# Patient Record
Sex: MALE | Race: WHITE | NOT HISPANIC OR LATINO | Employment: FULL TIME | URBAN - METROPOLITAN AREA
[De-identification: names, ages, dates, MRNs, and addresses within clinical notes are randomized per-mention and may not be internally consistent; named-entity substitution may affect disease eponyms.]

---

## 2017-03-18 ENCOUNTER — COMMUNICATION - HEALTHEAST (OUTPATIENT)
Dept: INTERNAL MEDICINE | Facility: CLINIC | Age: 57
End: 2017-03-18

## 2017-03-22 ENCOUNTER — OFFICE VISIT - HEALTHEAST (OUTPATIENT)
Dept: INTERNAL MEDICINE | Facility: CLINIC | Age: 57
End: 2017-03-22

## 2017-03-22 DIAGNOSIS — J02.9 SORE THROAT: ICD-10-CM

## 2017-03-22 DIAGNOSIS — R79.81 BORDERLINE LOW O2 SATURATION: ICD-10-CM

## 2017-03-22 DIAGNOSIS — E66.01 MORBID OBESITY DUE TO EXCESS CALORIES (H): ICD-10-CM

## 2017-03-22 DIAGNOSIS — R40.0 DAYTIME SOMNOLENCE: ICD-10-CM

## 2017-03-23 ENCOUNTER — COMMUNICATION - HEALTHEAST (OUTPATIENT)
Dept: INTERNAL MEDICINE | Facility: CLINIC | Age: 57
End: 2017-03-23

## 2017-03-27 ENCOUNTER — AMBULATORY - HEALTHEAST (OUTPATIENT)
Dept: INTERNAL MEDICINE | Facility: CLINIC | Age: 57
End: 2017-03-27

## 2017-03-27 ENCOUNTER — COMMUNICATION - HEALTHEAST (OUTPATIENT)
Dept: INTERNAL MEDICINE | Facility: CLINIC | Age: 57
End: 2017-03-27

## 2017-06-06 ENCOUNTER — COMMUNICATION - HEALTHEAST (OUTPATIENT)
Dept: INTERNAL MEDICINE | Facility: CLINIC | Age: 57
End: 2017-06-06

## 2017-07-28 ENCOUNTER — COMMUNICATION - HEALTHEAST (OUTPATIENT)
Dept: INTERNAL MEDICINE | Facility: CLINIC | Age: 57
End: 2017-07-28

## 2018-05-14 ENCOUNTER — COMMUNICATION - HEALTHEAST (OUTPATIENT)
Dept: SCHEDULING | Facility: CLINIC | Age: 58
End: 2018-05-14

## 2018-05-25 ENCOUNTER — COMMUNICATION - HEALTHEAST (OUTPATIENT)
Dept: INTERNAL MEDICINE | Facility: CLINIC | Age: 58
End: 2018-05-25

## 2018-06-12 ENCOUNTER — COMMUNICATION - HEALTHEAST (OUTPATIENT)
Dept: INTERNAL MEDICINE | Facility: CLINIC | Age: 58
End: 2018-06-12

## 2018-06-16 ENCOUNTER — COMMUNICATION - HEALTHEAST (OUTPATIENT)
Dept: INTERNAL MEDICINE | Facility: CLINIC | Age: 58
End: 2018-06-16

## 2018-06-18 ENCOUNTER — COMMUNICATION - HEALTHEAST (OUTPATIENT)
Dept: SCHEDULING | Facility: CLINIC | Age: 58
End: 2018-06-18

## 2018-06-18 ENCOUNTER — AMBULATORY - HEALTHEAST (OUTPATIENT)
Dept: INTERNAL MEDICINE | Facility: CLINIC | Age: 58
End: 2018-06-18

## 2018-06-18 DIAGNOSIS — I10 ESSENTIAL HYPERTENSION: ICD-10-CM

## 2018-06-18 DIAGNOSIS — E78.5 HYPERLIPIDEMIA: ICD-10-CM

## 2018-06-18 DIAGNOSIS — R79.89 ABNORMAL LFTS (LIVER FUNCTION TESTS): ICD-10-CM

## 2018-06-18 DIAGNOSIS — Z12.5 SCREENING FOR PROSTATE CANCER: ICD-10-CM

## 2018-06-20 ENCOUNTER — AMBULATORY - HEALTHEAST (OUTPATIENT)
Dept: LAB | Facility: CLINIC | Age: 58
End: 2018-06-20

## 2018-06-20 DIAGNOSIS — Z12.5 SCREENING FOR PROSTATE CANCER: ICD-10-CM

## 2018-06-20 DIAGNOSIS — I10 ESSENTIAL HYPERTENSION: ICD-10-CM

## 2018-06-20 DIAGNOSIS — E78.5 HYPERLIPIDEMIA: ICD-10-CM

## 2018-06-20 LAB
ALBUMIN SERPL-MCNC: 3.9 G/DL (ref 3.5–5)
ALBUMIN UR-MCNC: ABNORMAL MG/DL
ALP SERPL-CCNC: 47 U/L (ref 45–120)
ALT SERPL W P-5'-P-CCNC: 71 U/L (ref 0–45)
ANION GAP SERPL CALCULATED.3IONS-SCNC: 10 MMOL/L (ref 5–18)
APPEARANCE UR: CLEAR
AST SERPL W P-5'-P-CCNC: 44 U/L (ref 0–40)
BACTERIA #/AREA URNS HPF: ABNORMAL HPF
BILIRUB SERPL-MCNC: 0.6 MG/DL (ref 0–1)
BILIRUB UR QL STRIP: NEGATIVE
BUN SERPL-MCNC: 13 MG/DL (ref 8–22)
CALCIUM SERPL-MCNC: 9.7 MG/DL (ref 8.5–10.5)
CHLORIDE BLD-SCNC: 109 MMOL/L (ref 98–107)
CHOLEST SERPL-MCNC: 255 MG/DL
CO2 SERPL-SCNC: 26 MMOL/L (ref 22–31)
COLOR UR AUTO: YELLOW
CREAT SERPL-MCNC: 1.08 MG/DL (ref 0.7–1.3)
FASTING STATUS PATIENT QL REPORTED: YES
GFR SERPL CREATININE-BSD FRML MDRD: >60 ML/MIN/1.73M2
GLUCOSE BLD-MCNC: 97 MG/DL (ref 70–125)
GLUCOSE UR STRIP-MCNC: NEGATIVE MG/DL
HDLC SERPL-MCNC: 38 MG/DL
HGB BLD-MCNC: 15.5 G/DL (ref 14–18)
HGB UR QL STRIP: ABNORMAL
KETONES UR STRIP-MCNC: NEGATIVE MG/DL
LDLC SERPL CALC-MCNC: 166 MG/DL
LEUKOCYTE ESTERASE UR QL STRIP: ABNORMAL
MUCOUS THREADS #/AREA URNS LPF: ABNORMAL LPF
NITRATE UR QL: NEGATIVE
PH UR STRIP: 5.5 [PH] (ref 5–8)
POTASSIUM BLD-SCNC: 4.3 MMOL/L (ref 3.5–5)
PROT SERPL-MCNC: 7.1 G/DL (ref 6–8)
PSA SERPL-MCNC: 0.4 NG/ML (ref 0–3.5)
RBC #/AREA URNS AUTO: ABNORMAL HPF
SODIUM SERPL-SCNC: 145 MMOL/L (ref 136–145)
SP GR UR STRIP: 1.02 (ref 1–1.03)
SQUAMOUS #/AREA URNS AUTO: ABNORMAL LPF
TRIGL SERPL-MCNC: 256 MG/DL
UROBILINOGEN UR STRIP-ACNC: ABNORMAL
WBC #/AREA URNS AUTO: ABNORMAL HPF

## 2018-06-26 ENCOUNTER — OFFICE VISIT - HEALTHEAST (OUTPATIENT)
Dept: INTERNAL MEDICINE | Facility: CLINIC | Age: 58
End: 2018-06-26

## 2018-06-26 ENCOUNTER — COMMUNICATION - HEALTHEAST (OUTPATIENT)
Dept: INTERNAL MEDICINE | Facility: CLINIC | Age: 58
End: 2018-06-26

## 2018-06-26 ENCOUNTER — RECORDS - HEALTHEAST (OUTPATIENT)
Dept: GENERAL RADIOLOGY | Facility: CLINIC | Age: 58
End: 2018-06-26

## 2018-06-26 DIAGNOSIS — S89.90XA LEG INJURY: ICD-10-CM

## 2018-06-26 DIAGNOSIS — S89.90XA UNSPECIFIED INJURY OF UNSPECIFIED LOWER LEG, INITIAL ENCOUNTER: ICD-10-CM

## 2018-06-26 DIAGNOSIS — Z12.11 SCREEN FOR COLON CANCER: ICD-10-CM

## 2018-06-26 ASSESSMENT — MIFFLIN-ST. JEOR: SCORE: 2309.05

## 2018-07-20 ENCOUNTER — COMMUNICATION - HEALTHEAST (OUTPATIENT)
Dept: INTERNAL MEDICINE | Facility: CLINIC | Age: 58
End: 2018-07-20

## 2018-07-23 ENCOUNTER — OFFICE VISIT - HEALTHEAST (OUTPATIENT)
Dept: INTERNAL MEDICINE | Facility: CLINIC | Age: 58
End: 2018-07-23

## 2018-07-23 DIAGNOSIS — F41.8 DEPRESSION WITH ANXIETY: ICD-10-CM

## 2018-07-23 DIAGNOSIS — E66.01 MORBID OBESITY (H): ICD-10-CM

## 2018-07-23 DIAGNOSIS — R31.29 MICROSCOPIC HEMATURIA: ICD-10-CM

## 2018-07-23 DIAGNOSIS — Z00.00 ROUTINE GENERAL MEDICAL EXAMINATION AT A HEALTH CARE FACILITY: ICD-10-CM

## 2018-07-23 DIAGNOSIS — I10 ESSENTIAL HYPERTENSION: ICD-10-CM

## 2018-07-23 DIAGNOSIS — R79.89 ABNORMAL LFTS (LIVER FUNCTION TESTS): ICD-10-CM

## 2018-07-23 DIAGNOSIS — Z12.11 COLON CANCER SCREENING: ICD-10-CM

## 2018-07-23 DIAGNOSIS — S80.12XD HEMATOMA OF LEG, LEFT, SUBSEQUENT ENCOUNTER: ICD-10-CM

## 2018-07-23 DIAGNOSIS — E78.5 HYPERLIPIDEMIA: ICD-10-CM

## 2018-07-23 LAB
ALBUMIN UR-MCNC: NEGATIVE MG/DL
APPEARANCE UR: CLEAR
BACTERIA #/AREA URNS HPF: ABNORMAL HPF
BILIRUB UR QL STRIP: NEGATIVE
COLOR UR AUTO: YELLOW
GLUCOSE UR STRIP-MCNC: NEGATIVE MG/DL
HGB UR QL STRIP: ABNORMAL
KETONES UR STRIP-MCNC: NEGATIVE MG/DL
LEUKOCYTE ESTERASE UR QL STRIP: NEGATIVE
NITRATE UR QL: NEGATIVE
PH UR STRIP: 5.5 [PH] (ref 5–8)
RBC #/AREA URNS AUTO: ABNORMAL HPF
SP GR UR STRIP: 1.02 (ref 1–1.03)
SQUAMOUS #/AREA URNS AUTO: ABNORMAL LPF
UROBILINOGEN UR STRIP-ACNC: ABNORMAL
WBC #/AREA URNS AUTO: ABNORMAL HPF

## 2018-07-23 ASSESSMENT — MIFFLIN-ST. JEOR: SCORE: 2314.72

## 2018-07-24 ENCOUNTER — COMMUNICATION - HEALTHEAST (OUTPATIENT)
Dept: INTERNAL MEDICINE | Facility: CLINIC | Age: 58
End: 2018-07-24

## 2018-08-06 ENCOUNTER — COMMUNICATION - HEALTHEAST (OUTPATIENT)
Dept: SURGERY | Facility: CLINIC | Age: 58
End: 2018-08-06

## 2018-09-10 ENCOUNTER — AMBULATORY - HEALTHEAST (OUTPATIENT)
Dept: SLEEP MEDICINE | Facility: CLINIC | Age: 58
End: 2018-09-10

## 2018-09-10 ENCOUNTER — OFFICE VISIT - HEALTHEAST (OUTPATIENT)
Dept: SURGERY | Facility: CLINIC | Age: 58
End: 2018-09-10

## 2018-09-10 DIAGNOSIS — R40.0 DAYTIME SLEEPINESS: ICD-10-CM

## 2018-09-10 DIAGNOSIS — E78.5 HYPERLIPIDEMIA: ICD-10-CM

## 2018-09-10 DIAGNOSIS — I10 HTN (HYPERTENSION): ICD-10-CM

## 2018-09-10 DIAGNOSIS — E66.01 MORBID OBESITY WITH BMI OF 50.0-59.9, ADULT (H): ICD-10-CM

## 2018-09-10 DIAGNOSIS — E78.6 LOW HDL (UNDER 40): ICD-10-CM

## 2018-09-10 ASSESSMENT — MIFFLIN-ST. JEOR: SCORE: 2346.47

## 2018-10-08 ENCOUNTER — OFFICE VISIT - HEALTHEAST (OUTPATIENT)
Dept: SURGERY | Facility: CLINIC | Age: 58
End: 2018-10-08

## 2018-10-08 DIAGNOSIS — E78.5 HYPERLIPIDEMIA, UNSPECIFIED HYPERLIPIDEMIA TYPE: ICD-10-CM

## 2018-10-08 DIAGNOSIS — R79.89 ABNORMAL LFTS (LIVER FUNCTION TESTS): ICD-10-CM

## 2018-10-08 DIAGNOSIS — E66.01 OBESITY, MORBID, BMI 50 OR HIGHER (H): ICD-10-CM

## 2018-10-08 DIAGNOSIS — Z71.3 DIETARY COUNSELING: ICD-10-CM

## 2018-10-08 DIAGNOSIS — I10 ESSENTIAL HYPERTENSION: ICD-10-CM

## 2018-10-08 ASSESSMENT — MIFFLIN-ST. JEOR: SCORE: 2310.18

## 2018-11-16 ENCOUNTER — OFFICE VISIT - HEALTHEAST (OUTPATIENT)
Dept: INTERNAL MEDICINE | Facility: CLINIC | Age: 58
End: 2018-11-16

## 2018-11-16 DIAGNOSIS — R29.818 SUSPECTED SLEEP APNEA: ICD-10-CM

## 2018-11-16 DIAGNOSIS — I10 ESSENTIAL HYPERTENSION: ICD-10-CM

## 2018-11-16 DIAGNOSIS — E66.01 MORBID OBESITY WITH BMI OF 50.0-59.9, ADULT (H): ICD-10-CM

## 2018-11-16 DIAGNOSIS — F41.8 DEPRESSION WITH ANXIETY: ICD-10-CM

## 2018-11-16 DIAGNOSIS — Z01.818 ENCOUNTER FOR PREOPERATIVE EXAMINATION FOR GENERAL SURGICAL PROCEDURE: ICD-10-CM

## 2018-11-16 DIAGNOSIS — E66.01 MORBID OBESITY (H): ICD-10-CM

## 2018-11-16 DIAGNOSIS — Z12.11 SCREENING FOR COLON CANCER: ICD-10-CM

## 2018-11-16 LAB
ATRIAL RATE - MUSE: 79 BPM
DIASTOLIC BLOOD PRESSURE - MUSE: NORMAL MMHG
HBA1C MFR BLD: 5.6 % (ref 3.5–6)
INTERPRETATION ECG - MUSE: NORMAL
P AXIS - MUSE: 80 DEGREES
PR INTERVAL - MUSE: 184 MS
QRS DURATION - MUSE: 84 MS
QT - MUSE: 370 MS
QTC - MUSE: 424 MS
R AXIS - MUSE: 20 DEGREES
SYSTOLIC BLOOD PRESSURE - MUSE: NORMAL MMHG
T AXIS - MUSE: 32 DEGREES
VENTRICULAR RATE- MUSE: 79 BPM

## 2018-11-16 ASSESSMENT — MIFFLIN-ST. JEOR: SCORE: 2328.33

## 2018-11-19 ENCOUNTER — COMMUNICATION - HEALTHEAST (OUTPATIENT)
Dept: INTERNAL MEDICINE | Facility: CLINIC | Age: 58
End: 2018-11-19

## 2018-11-19 LAB
25(OH)D3 SERPL-MCNC: 10.6 NG/ML (ref 30–80)
ANION GAP SERPL CALCULATED.3IONS-SCNC: 13 MMOL/L (ref 5–18)
BUN SERPL-MCNC: 25 MG/DL (ref 8–22)
CALCIUM SERPL-MCNC: 10.3 MG/DL (ref 8.5–10.5)
CHLORIDE BLD-SCNC: 106 MMOL/L (ref 98–107)
CO2 SERPL-SCNC: 24 MMOL/L (ref 22–31)
CREAT SERPL-MCNC: 0.98 MG/DL (ref 0.7–1.3)
GFR SERPL CREATININE-BSD FRML MDRD: >60 ML/MIN/1.73M2
GLUCOSE BLD-MCNC: 99 MG/DL (ref 70–125)
POTASSIUM BLD-SCNC: 4.3 MMOL/L (ref 3.5–5)
SODIUM SERPL-SCNC: 143 MMOL/L (ref 136–145)
TSH SERPL DL<=0.005 MIU/L-ACNC: 1.88 UIU/ML (ref 0.3–5)
VIT B12 SERPL-MCNC: 410 PG/ML (ref 213–816)

## 2018-11-20 ENCOUNTER — AMBULATORY - HEALTHEAST (OUTPATIENT)
Dept: SURGERY | Facility: CLINIC | Age: 58
End: 2018-11-20

## 2018-11-20 DIAGNOSIS — R79.89 LOW VITAMIN D LEVEL: ICD-10-CM

## 2018-11-21 ENCOUNTER — AMBULATORY - HEALTHEAST (OUTPATIENT)
Dept: MULTI SPECIALTY CLINIC | Facility: CLINIC | Age: 58
End: 2018-11-21

## 2018-11-27 ENCOUNTER — COMMUNICATION - HEALTHEAST (OUTPATIENT)
Dept: INTERNAL MEDICINE | Facility: CLINIC | Age: 58
End: 2018-11-27

## 2018-11-27 DIAGNOSIS — F41.8 DEPRESSION WITH ANXIETY: ICD-10-CM

## 2018-12-03 ENCOUNTER — COMMUNICATION - HEALTHEAST (OUTPATIENT)
Dept: INTERNAL MEDICINE | Facility: CLINIC | Age: 58
End: 2018-12-03

## 2018-12-03 DIAGNOSIS — I10 ESSENTIAL HYPERTENSION: ICD-10-CM

## 2019-06-04 ENCOUNTER — COMMUNICATION - HEALTHEAST (OUTPATIENT)
Dept: INTERNAL MEDICINE | Facility: CLINIC | Age: 59
End: 2019-06-04

## 2019-06-10 ENCOUNTER — OFFICE VISIT - HEALTHEAST (OUTPATIENT)
Dept: INTERNAL MEDICINE | Facility: CLINIC | Age: 59
End: 2019-06-10

## 2019-06-10 DIAGNOSIS — R29.818 SUSPECTED SLEEP APNEA: ICD-10-CM

## 2019-06-10 DIAGNOSIS — I10 ESSENTIAL HYPERTENSION: ICD-10-CM

## 2019-06-10 DIAGNOSIS — E55.9 VITAMIN D DEFICIENCY: ICD-10-CM

## 2019-06-10 DIAGNOSIS — F32.1 MODERATE MAJOR DEPRESSION (H): ICD-10-CM

## 2019-06-10 DIAGNOSIS — G44.229 CHRONIC TENSION-TYPE HEADACHE, NOT INTRACTABLE: ICD-10-CM

## 2019-06-10 DIAGNOSIS — E66.01 MORBID OBESITY (H): ICD-10-CM

## 2019-06-10 LAB
ANION GAP SERPL CALCULATED.3IONS-SCNC: 10 MMOL/L (ref 5–18)
BUN SERPL-MCNC: 14 MG/DL (ref 8–22)
CALCIUM SERPL-MCNC: 9.6 MG/DL (ref 8.5–10.5)
CHLORIDE BLD-SCNC: 109 MMOL/L (ref 98–107)
CO2 SERPL-SCNC: 23 MMOL/L (ref 22–31)
CREAT SERPL-MCNC: 0.99 MG/DL (ref 0.7–1.3)
GFR SERPL CREATININE-BSD FRML MDRD: >60 ML/MIN/1.73M2
GLUCOSE BLD-MCNC: 100 MG/DL (ref 70–125)
HGB BLD-MCNC: 14.8 G/DL (ref 14–18)
POTASSIUM BLD-SCNC: 4.3 MMOL/L (ref 3.5–5)
SODIUM SERPL-SCNC: 142 MMOL/L (ref 136–145)

## 2019-06-10 ASSESSMENT — MIFFLIN-ST. JEOR: SCORE: 2382.76

## 2019-06-11 ENCOUNTER — COMMUNICATION - HEALTHEAST (OUTPATIENT)
Dept: INTERNAL MEDICINE | Facility: CLINIC | Age: 59
End: 2019-06-11

## 2019-06-17 ENCOUNTER — OFFICE VISIT - HEALTHEAST (OUTPATIENT)
Dept: BEHAVIORAL HEALTH | Facility: CLINIC | Age: 59
End: 2019-06-17

## 2019-06-17 DIAGNOSIS — F32.A DEPRESSION, UNSPECIFIED DEPRESSION TYPE: ICD-10-CM

## 2019-07-15 ENCOUNTER — OFFICE VISIT - HEALTHEAST (OUTPATIENT)
Dept: BEHAVIORAL HEALTH | Facility: CLINIC | Age: 59
End: 2019-07-15

## 2019-07-15 DIAGNOSIS — F32.A DEPRESSION, UNSPECIFIED DEPRESSION TYPE: ICD-10-CM

## 2019-07-23 ENCOUNTER — OFFICE VISIT - HEALTHEAST (OUTPATIENT)
Dept: BEHAVIORAL HEALTH | Facility: CLINIC | Age: 59
End: 2019-07-23

## 2019-07-23 DIAGNOSIS — F32.A DEPRESSION, UNSPECIFIED DEPRESSION TYPE: ICD-10-CM

## 2019-08-05 ENCOUNTER — OFFICE VISIT - HEALTHEAST (OUTPATIENT)
Dept: INTERNAL MEDICINE | Facility: CLINIC | Age: 59
End: 2019-08-05

## 2019-08-05 ENCOUNTER — OFFICE VISIT - HEALTHEAST (OUTPATIENT)
Dept: BEHAVIORAL HEALTH | Facility: CLINIC | Age: 59
End: 2019-08-05

## 2019-08-05 DIAGNOSIS — F32.1 MODERATE MAJOR DEPRESSION (H): ICD-10-CM

## 2019-08-05 DIAGNOSIS — E66.01 MORBID OBESITY (H): ICD-10-CM

## 2019-08-05 DIAGNOSIS — Z12.5 ENCOUNTER FOR PROSTATE CANCER SCREENING: ICD-10-CM

## 2019-08-05 DIAGNOSIS — M54.50 CHRONIC LOW BACK PAIN WITHOUT SCIATICA, UNSPECIFIED BACK PAIN LATERALITY: ICD-10-CM

## 2019-08-05 DIAGNOSIS — R79.89 ABNORMAL LFTS (LIVER FUNCTION TESTS): ICD-10-CM

## 2019-08-05 DIAGNOSIS — I10 ESSENTIAL HYPERTENSION: ICD-10-CM

## 2019-08-05 DIAGNOSIS — Z86.0100 HISTORY OF COLON POLYPS: ICD-10-CM

## 2019-08-05 DIAGNOSIS — Z00.00 ROUTINE GENERAL MEDICAL EXAMINATION AT A HEALTH CARE FACILITY: ICD-10-CM

## 2019-08-05 DIAGNOSIS — Z11.4 SCREENING FOR HIV (HUMAN IMMUNODEFICIENCY VIRUS): ICD-10-CM

## 2019-08-05 DIAGNOSIS — N20.0 NEPHROLITHIASIS: ICD-10-CM

## 2019-08-05 DIAGNOSIS — R29.818 SUSPECTED SLEEP APNEA: ICD-10-CM

## 2019-08-05 DIAGNOSIS — E55.9 VITAMIN D DEFICIENCY: ICD-10-CM

## 2019-08-05 DIAGNOSIS — E78.5 HYPERLIPIDEMIA, UNSPECIFIED HYPERLIPIDEMIA TYPE: ICD-10-CM

## 2019-08-05 DIAGNOSIS — F32.A DEPRESSION, UNSPECIFIED DEPRESSION TYPE: ICD-10-CM

## 2019-08-05 DIAGNOSIS — G89.29 CHRONIC LOW BACK PAIN WITHOUT SCIATICA, UNSPECIFIED BACK PAIN LATERALITY: ICD-10-CM

## 2019-08-05 LAB
ALBUMIN SERPL-MCNC: 4 G/DL (ref 3.5–5)
ALBUMIN UR-MCNC: ABNORMAL MG/DL
ALP SERPL-CCNC: 50 U/L (ref 45–120)
ALT SERPL W P-5'-P-CCNC: 103 U/L (ref 0–45)
ANION GAP SERPL CALCULATED.3IONS-SCNC: 10 MMOL/L (ref 5–18)
APPEARANCE UR: CLEAR
AST SERPL W P-5'-P-CCNC: 81 U/L (ref 0–40)
BACTERIA #/AREA URNS HPF: ABNORMAL HPF
BILIRUB SERPL-MCNC: 0.6 MG/DL (ref 0–1)
BILIRUB UR QL STRIP: NEGATIVE
BUN SERPL-MCNC: 15 MG/DL (ref 8–22)
CALCIUM SERPL-MCNC: 9.9 MG/DL (ref 8.5–10.5)
CHLORIDE BLD-SCNC: 105 MMOL/L (ref 98–107)
CHOLEST SERPL-MCNC: 288 MG/DL
CO2 SERPL-SCNC: 26 MMOL/L (ref 22–31)
COLOR UR AUTO: YELLOW
CREAT SERPL-MCNC: 0.98 MG/DL (ref 0.7–1.3)
FASTING STATUS PATIENT QL REPORTED: YES
GFR SERPL CREATININE-BSD FRML MDRD: >60 ML/MIN/1.73M2
GLUCOSE BLD-MCNC: 91 MG/DL (ref 70–125)
GLUCOSE UR STRIP-MCNC: NEGATIVE MG/DL
HDLC SERPL-MCNC: 39 MG/DL
HGB UR QL STRIP: ABNORMAL
HIV 1+2 AB+HIV1 P24 AG SERPL QL IA: NEGATIVE
KETONES UR STRIP-MCNC: NEGATIVE MG/DL
LDLC SERPL CALC-MCNC: 180 MG/DL
LEUKOCYTE ESTERASE UR QL STRIP: ABNORMAL
MUCOUS THREADS #/AREA URNS LPF: ABNORMAL LPF
NITRATE UR QL: NEGATIVE
PH UR STRIP: 5.5 [PH] (ref 5–8)
POTASSIUM BLD-SCNC: 4.1 MMOL/L (ref 3.5–5)
PROT SERPL-MCNC: 7.5 G/DL (ref 6–8)
PSA SERPL-MCNC: 0.5 NG/ML (ref 0–3.5)
RBC #/AREA URNS AUTO: ABNORMAL HPF
SODIUM SERPL-SCNC: 141 MMOL/L (ref 136–145)
SP GR UR STRIP: 1.02 (ref 1–1.03)
SQUAMOUS #/AREA URNS AUTO: ABNORMAL LPF
TRIGL SERPL-MCNC: 347 MG/DL
UROBILINOGEN UR STRIP-ACNC: ABNORMAL
WBC #/AREA URNS AUTO: ABNORMAL HPF

## 2019-08-05 ASSESSMENT — MIFFLIN-ST. JEOR: SCORE: 2373.69

## 2019-08-06 LAB — 25(OH)D3 SERPL-MCNC: 15.9 NG/ML (ref 30–80)

## 2019-08-08 ENCOUNTER — COMMUNICATION - HEALTHEAST (OUTPATIENT)
Dept: INTERNAL MEDICINE | Facility: CLINIC | Age: 59
End: 2019-08-08

## 2019-08-08 ENCOUNTER — AMBULATORY - HEALTHEAST (OUTPATIENT)
Dept: INTERNAL MEDICINE | Facility: CLINIC | Age: 59
End: 2019-08-08

## 2019-08-08 DIAGNOSIS — E78.5 HYPERLIPIDEMIA, UNSPECIFIED HYPERLIPIDEMIA TYPE: ICD-10-CM

## 2019-08-20 ENCOUNTER — OFFICE VISIT - HEALTHEAST (OUTPATIENT)
Dept: BEHAVIORAL HEALTH | Facility: CLINIC | Age: 59
End: 2019-08-20

## 2019-08-20 DIAGNOSIS — F32.A DEPRESSION, UNSPECIFIED DEPRESSION TYPE: ICD-10-CM

## 2019-09-09 ENCOUNTER — OFFICE VISIT - HEALTHEAST (OUTPATIENT)
Dept: BEHAVIORAL HEALTH | Facility: CLINIC | Age: 59
End: 2019-09-09

## 2019-09-09 DIAGNOSIS — F32.A DEPRESSION, UNSPECIFIED DEPRESSION TYPE: ICD-10-CM

## 2019-09-23 ENCOUNTER — OFFICE VISIT - HEALTHEAST (OUTPATIENT)
Dept: BEHAVIORAL HEALTH | Facility: CLINIC | Age: 59
End: 2019-09-23

## 2019-09-23 DIAGNOSIS — F32.A DEPRESSION, UNSPECIFIED DEPRESSION TYPE: ICD-10-CM

## 2019-10-07 ENCOUNTER — OFFICE VISIT - HEALTHEAST (OUTPATIENT)
Dept: BEHAVIORAL HEALTH | Facility: CLINIC | Age: 59
End: 2019-10-07

## 2019-10-07 DIAGNOSIS — F32.A DEPRESSION, UNSPECIFIED DEPRESSION TYPE: ICD-10-CM

## 2019-10-21 ENCOUNTER — OFFICE VISIT - HEALTHEAST (OUTPATIENT)
Dept: BEHAVIORAL HEALTH | Facility: CLINIC | Age: 59
End: 2019-10-21

## 2019-10-21 DIAGNOSIS — F32.A DEPRESSION, UNSPECIFIED DEPRESSION TYPE: ICD-10-CM

## 2019-11-04 ENCOUNTER — OFFICE VISIT - HEALTHEAST (OUTPATIENT)
Dept: BEHAVIORAL HEALTH | Facility: CLINIC | Age: 59
End: 2019-11-04

## 2019-11-04 DIAGNOSIS — F32.A DEPRESSION, UNSPECIFIED DEPRESSION TYPE: ICD-10-CM

## 2019-11-18 ENCOUNTER — OFFICE VISIT - HEALTHEAST (OUTPATIENT)
Dept: BEHAVIORAL HEALTH | Facility: CLINIC | Age: 59
End: 2019-11-18

## 2019-11-18 DIAGNOSIS — F32.A DEPRESSION, UNSPECIFIED DEPRESSION TYPE: ICD-10-CM

## 2019-12-02 ENCOUNTER — OFFICE VISIT - HEALTHEAST (OUTPATIENT)
Dept: BEHAVIORAL HEALTH | Facility: CLINIC | Age: 59
End: 2019-12-02

## 2019-12-02 DIAGNOSIS — F32.A DEPRESSION, UNSPECIFIED DEPRESSION TYPE: ICD-10-CM

## 2019-12-16 ENCOUNTER — OFFICE VISIT - HEALTHEAST (OUTPATIENT)
Dept: BEHAVIORAL HEALTH | Facility: CLINIC | Age: 59
End: 2019-12-16

## 2019-12-16 ENCOUNTER — AMBULATORY - HEALTHEAST (OUTPATIENT)
Dept: BEHAVIORAL HEALTH | Facility: CLINIC | Age: 59
End: 2019-12-16

## 2019-12-16 DIAGNOSIS — F32.A DEPRESSION, UNSPECIFIED DEPRESSION TYPE: ICD-10-CM

## 2019-12-16 ASSESSMENT — ANXIETY QUESTIONNAIRES
2. NOT BEING ABLE TO STOP OR CONTROL WORRYING: SEVERAL DAYS
7. FEELING AFRAID AS IF SOMETHING AWFUL MIGHT HAPPEN: NOT AT ALL
1. FEELING NERVOUS, ANXIOUS, OR ON EDGE: NOT AT ALL
5. BEING SO RESTLESS THAT IT IS HARD TO SIT STILL: NOT AT ALL
4. TROUBLE RELAXING: NOT AT ALL
GAD7 TOTAL SCORE: 3
6. BECOMING EASILY ANNOYED OR IRRITABLE: SEVERAL DAYS
IF YOU CHECKED OFF ANY PROBLEMS ON THIS QUESTIONNAIRE, HOW DIFFICULT HAVE THESE PROBLEMS MADE IT FOR YOU TO DO YOUR WORK, TAKE CARE OF THINGS AT HOME, OR GET ALONG WITH OTHER PEOPLE: NOT DIFFICULT AT ALL
3. WORRYING TOO MUCH ABOUT DIFFERENT THINGS: SEVERAL DAYS

## 2019-12-16 ASSESSMENT — PATIENT HEALTH QUESTIONNAIRE - PHQ9: SUM OF ALL RESPONSES TO PHQ QUESTIONS 1-9: 10

## 2020-01-06 ENCOUNTER — OFFICE VISIT - HEALTHEAST (OUTPATIENT)
Dept: BEHAVIORAL HEALTH | Facility: CLINIC | Age: 60
End: 2020-01-06

## 2020-01-06 DIAGNOSIS — F32.A DEPRESSION, UNSPECIFIED DEPRESSION TYPE: ICD-10-CM

## 2020-01-20 ENCOUNTER — OFFICE VISIT - HEALTHEAST (OUTPATIENT)
Dept: BEHAVIORAL HEALTH | Facility: CLINIC | Age: 60
End: 2020-01-20

## 2020-01-20 DIAGNOSIS — F32.A DEPRESSION, UNSPECIFIED DEPRESSION TYPE: ICD-10-CM

## 2020-02-03 ENCOUNTER — OFFICE VISIT - HEALTHEAST (OUTPATIENT)
Dept: BEHAVIORAL HEALTH | Facility: CLINIC | Age: 60
End: 2020-02-03

## 2020-02-03 DIAGNOSIS — F32.A DEPRESSION, UNSPECIFIED DEPRESSION TYPE: ICD-10-CM

## 2020-05-29 ENCOUNTER — OFFICE VISIT - HEALTHEAST (OUTPATIENT)
Dept: INTERNAL MEDICINE | Facility: CLINIC | Age: 60
End: 2020-05-29

## 2020-05-29 ENCOUNTER — COMMUNICATION - HEALTHEAST (OUTPATIENT)
Dept: INTERNAL MEDICINE | Facility: CLINIC | Age: 60
End: 2020-05-29

## 2020-05-29 DIAGNOSIS — L03.116 CELLULITIS OF LEFT LOWER EXTREMITY: ICD-10-CM

## 2020-07-07 ENCOUNTER — COMMUNICATION - HEALTHEAST (OUTPATIENT)
Dept: SCHEDULING | Facility: CLINIC | Age: 60
End: 2020-07-07

## 2020-07-08 ENCOUNTER — OFFICE VISIT - HEALTHEAST (OUTPATIENT)
Dept: INTERNAL MEDICINE | Facility: CLINIC | Age: 60
End: 2020-07-08

## 2020-07-08 DIAGNOSIS — L02.419 CELLULITIS AND ABSCESS OF LEG: ICD-10-CM

## 2020-07-08 DIAGNOSIS — I10 ESSENTIAL HYPERTENSION: ICD-10-CM

## 2020-07-08 DIAGNOSIS — L03.119 CELLULITIS AND ABSCESS OF LEG: ICD-10-CM

## 2020-07-08 ASSESSMENT — MIFFLIN-ST. JEOR: SCORE: 2409.51

## 2020-07-10 ENCOUNTER — HOSPITAL ENCOUNTER (OUTPATIENT)
Dept: ULTRASOUND IMAGING | Facility: CLINIC | Age: 60
Discharge: HOME | End: 2020-07-10
Attending: INTERNAL MEDICINE

## 2020-07-10 DIAGNOSIS — L03.119 CELLULITIS AND ABSCESS OF LEG: ICD-10-CM

## 2020-07-10 DIAGNOSIS — L02.419 CELLULITIS AND ABSCESS OF LEG: ICD-10-CM

## 2020-07-11 LAB
BACTERIA SPEC CULT: ABNORMAL
GRAM STAIN RESULT: ABNORMAL
GRAM STAIN RESULT: ABNORMAL

## 2020-07-23 ENCOUNTER — COMMUNICATION - HEALTHEAST (OUTPATIENT)
Dept: INTERNAL MEDICINE | Facility: CLINIC | Age: 60
End: 2020-07-23

## 2020-09-11 ENCOUNTER — COMMUNICATION - HEALTHEAST (OUTPATIENT)
Dept: SURGERY | Facility: CLINIC | Age: 60
End: 2020-09-11

## 2020-09-21 ENCOUNTER — COMMUNICATION - HEALTHEAST (OUTPATIENT)
Dept: INTERNAL MEDICINE | Facility: CLINIC | Age: 60
End: 2020-09-21

## 2020-09-22 ENCOUNTER — COMMUNICATION - HEALTHEAST (OUTPATIENT)
Dept: SURGERY | Facility: CLINIC | Age: 60
End: 2020-09-22

## 2020-09-25 ENCOUNTER — OFFICE VISIT - HEALTHEAST (OUTPATIENT)
Dept: SURGERY | Facility: CLINIC | Age: 60
End: 2020-09-25

## 2020-09-25 ENCOUNTER — AMBULATORY - HEALTHEAST (OUTPATIENT)
Dept: LAB | Facility: CLINIC | Age: 60
End: 2020-09-25

## 2020-09-25 DIAGNOSIS — I10 HYPERTENSION, UNSPECIFIED TYPE: ICD-10-CM

## 2020-09-25 DIAGNOSIS — E66.01 OBESITY, MORBID, BMI 50 OR HIGHER (H): ICD-10-CM

## 2020-09-25 DIAGNOSIS — K76.0 FATTY LIVER: ICD-10-CM

## 2020-09-25 DIAGNOSIS — R06.83 SNORING: ICD-10-CM

## 2020-09-25 DIAGNOSIS — E78.2 MIXED HYPERLIPIDEMIA: ICD-10-CM

## 2020-09-25 DIAGNOSIS — R06.81 APNEA: ICD-10-CM

## 2020-09-25 LAB
ALBUMIN SERPL-MCNC: 4.2 G/DL (ref 3.5–5)
ALP SERPL-CCNC: 52 U/L (ref 45–120)
ALT SERPL W P-5'-P-CCNC: 82 U/L (ref 0–45)
ANION GAP SERPL CALCULATED.3IONS-SCNC: 11 MMOL/L (ref 5–18)
AST SERPL W P-5'-P-CCNC: 57 U/L (ref 0–40)
BILIRUB SERPL-MCNC: 0.8 MG/DL (ref 0–1)
BUN SERPL-MCNC: 16 MG/DL (ref 8–22)
CALCIUM SERPL-MCNC: 9.9 MG/DL (ref 8.5–10.5)
CHLORIDE BLD-SCNC: 105 MMOL/L (ref 98–107)
CO2 SERPL-SCNC: 24 MMOL/L (ref 22–31)
CREAT SERPL-MCNC: 1.06 MG/DL (ref 0.7–1.3)
ERYTHROCYTE [DISTWIDTH] IN BLOOD BY AUTOMATED COUNT: 11.8 % (ref 11–14.5)
GFR SERPL CREATININE-BSD FRML MDRD: >60 ML/MIN/1.73M2
GLUCOSE BLD-MCNC: 88 MG/DL (ref 70–125)
HBA1C MFR BLD: 5.6 %
HCT VFR BLD AUTO: 45.9 % (ref 40–54)
HGB BLD-MCNC: 15.6 G/DL (ref 14–18)
MCH RBC QN AUTO: 29.8 PG (ref 27–34)
MCHC RBC AUTO-ENTMCNC: 34 G/DL (ref 32–36)
MCV RBC AUTO: 88 FL (ref 80–100)
PLATELET # BLD AUTO: 238 THOU/UL (ref 140–440)
PMV BLD AUTO: 8.1 FL (ref 7–10)
POTASSIUM BLD-SCNC: 4 MMOL/L (ref 3.5–5)
PROT SERPL-MCNC: 7.3 G/DL (ref 6–8)
PTH-INTACT SERPL-MCNC: 30 PG/ML (ref 10–86)
RBC # BLD AUTO: 5.23 MILL/UL (ref 4.4–6.2)
SODIUM SERPL-SCNC: 140 MMOL/L (ref 136–145)
TSH SERPL DL<=0.005 MIU/L-ACNC: 0.98 UIU/ML (ref 0.3–5)
WBC: 8.2 THOU/UL (ref 4–11)

## 2020-09-25 ASSESSMENT — MIFFLIN-ST. JEOR: SCORE: 2372.77

## 2020-09-26 LAB
FERRITIN SERPL-MCNC: 285 NG/ML (ref 27–300)
FOLATE SERPL-MCNC: 7.3 NG/ML
VIT B12 SERPL-MCNC: 391 PG/ML (ref 213–816)

## 2020-09-28 ENCOUNTER — OFFICE VISIT - HEALTHEAST (OUTPATIENT)
Dept: SURGERY | Facility: CLINIC | Age: 60
End: 2020-09-28

## 2020-09-28 DIAGNOSIS — E78.2 MIXED HYPERLIPIDEMIA: ICD-10-CM

## 2020-09-28 DIAGNOSIS — I10 HYPERTENSION, UNSPECIFIED TYPE: ICD-10-CM

## 2020-09-28 DIAGNOSIS — Z71.3 NUTRITIONAL COUNSELING: ICD-10-CM

## 2020-09-28 DIAGNOSIS — E66.01 OBESITY, MORBID, BMI 50 OR HIGHER (H): ICD-10-CM

## 2020-09-28 LAB
25(OH)D3 SERPL-MCNC: 38.7 NG/ML (ref 30–80)
ANNOTATION COMMENT IMP: NORMAL
VIT A SERPL-MCNC: 0.61 MG/L (ref 0.3–1.2)
VITAMIN A (RETINYL PALMITATE): 0.04 MG/L (ref 0–0.1)

## 2020-09-29 ENCOUNTER — COMMUNICATION - HEALTHEAST (OUTPATIENT)
Dept: INTERNAL MEDICINE | Facility: CLINIC | Age: 60
End: 2020-09-29

## 2020-09-29 ENCOUNTER — AMBULATORY - HEALTHEAST (OUTPATIENT)
Dept: INTERNAL MEDICINE | Facility: CLINIC | Age: 60
End: 2020-09-29

## 2020-09-29 DIAGNOSIS — E78.5 HYPERLIPIDEMIA, UNSPECIFIED HYPERLIPIDEMIA TYPE: ICD-10-CM

## 2020-09-29 LAB
COPPER SERPL-MCNC: 130.1 UG/DL (ref 70–140)
VIT B1 PYROPHOSHATE BLD-SCNC: 132 NMOL/L (ref 70–180)
ZINC SERPL-MCNC: 71.6 UG/DL (ref 60–120)

## 2020-09-30 ENCOUNTER — COMMUNICATION - HEALTHEAST (OUTPATIENT)
Dept: SURGERY | Facility: CLINIC | Age: 60
End: 2020-09-30

## 2020-10-01 ENCOUNTER — OFFICE VISIT - HEALTHEAST (OUTPATIENT)
Dept: SURGERY | Facility: CLINIC | Age: 60
End: 2020-10-01

## 2020-10-01 DIAGNOSIS — E66.01 MORBID OBESITY (H): ICD-10-CM

## 2020-10-15 ENCOUNTER — COMMUNICATION - HEALTHEAST (OUTPATIENT)
Dept: SURGERY | Facility: CLINIC | Age: 60
End: 2020-10-15

## 2020-10-15 ENCOUNTER — COMMUNICATION - HEALTHEAST (OUTPATIENT)
Dept: INTERNAL MEDICINE | Facility: CLINIC | Age: 60
End: 2020-10-15

## 2020-10-15 DIAGNOSIS — E78.5 HYPERLIPIDEMIA, UNSPECIFIED HYPERLIPIDEMIA TYPE: ICD-10-CM

## 2020-10-26 ENCOUNTER — OFFICE VISIT - HEALTHEAST (OUTPATIENT)
Dept: SURGERY | Facility: CLINIC | Age: 60
End: 2020-10-26

## 2020-10-26 DIAGNOSIS — E78.2 MIXED HYPERLIPIDEMIA: ICD-10-CM

## 2020-10-26 DIAGNOSIS — Z71.3 NUTRITIONAL COUNSELING: ICD-10-CM

## 2020-10-26 DIAGNOSIS — E66.01 OBESITY, MORBID, BMI 50 OR HIGHER (H): ICD-10-CM

## 2020-10-26 DIAGNOSIS — I10 HYPERTENSION, UNSPECIFIED TYPE: ICD-10-CM

## 2020-10-27 RX ORDER — ROSUVASTATIN CALCIUM 10 MG/1
10 TABLET, COATED ORAL
COMMUNITY
Start: 2020-10-15 | End: 2021-07-29

## 2020-10-27 RX ORDER — CITALOPRAM HYDROBROMIDE 20 MG/1
20 TABLET ORAL
COMMUNITY
Start: 2019-06-10 | End: 2021-08-30

## 2020-10-27 RX ORDER — LISINOPRIL AND HYDROCHLOROTHIAZIDE 20; 25 MG/1; MG/1
1 TABLET ORAL
COMMUNITY
Start: 2020-07-08 | End: 2021-08-30

## 2020-10-27 ASSESSMENT — MIFFLIN-ST. JEOR: SCORE: 2328.79

## 2020-10-28 ENCOUNTER — VIRTUAL VISIT (OUTPATIENT)
Dept: SLEEP MEDICINE | Facility: CLINIC | Age: 60
End: 2020-10-28
Payer: COMMERCIAL

## 2020-10-28 VITALS — HEIGHT: 68 IN | BODY MASS INDEX: 47.74 KG/M2 | WEIGHT: 315 LBS

## 2020-10-28 DIAGNOSIS — R06.83 SNORING: ICD-10-CM

## 2020-10-28 DIAGNOSIS — G47.30 OBSERVED SLEEP APNEA: Primary | ICD-10-CM

## 2020-10-28 DIAGNOSIS — G47.26 SHIFTING SLEEP-WORK SCHEDULE: ICD-10-CM

## 2020-10-28 DIAGNOSIS — E66.01 MORBID OBESITY (H): ICD-10-CM

## 2020-10-28 PROCEDURE — 99203 OFFICE O/P NEW LOW 30 MIN: CPT | Mod: 95 | Performed by: INTERNAL MEDICINE

## 2020-10-28 SDOH — HEALTH STABILITY: MENTAL HEALTH: HOW OFTEN DO YOU HAVE A DRINK CONTAINING ALCOHOL?: 2-4 TIMES A MONTH

## 2020-10-28 SDOH — HEALTH STABILITY: MENTAL HEALTH: HOW OFTEN DO YOU HAVE 6 OR MORE DRINKS ON ONE OCCASION?: NEVER

## 2020-10-28 SDOH — HEALTH STABILITY: MENTAL HEALTH: HOW MANY STANDARD DRINKS CONTAINING ALCOHOL DO YOU HAVE ON A TYPICAL DAY?: 1 OR 2

## 2020-10-28 NOTE — PROGRESS NOTES
"Nick Coley is a 60 year old male who is being evaluated via a billable video visit.      The patient has been notified of following:     \"This video visit will be conducted via a call between you and your physician/provider. We have found that certain health care needs can be provided without the need for an in-person physical exam.  This service lets us provide the care you need with a video conversation.  If a prescription is necessary we can send it directly to your pharmacy.  If lab work is needed we can place an order for that and you can then stop by our lab to have the test done at a later time.    Video visits are billed at different rates depending on your insurance coverage.  Please reach out to your insurance provider with any questions.    If during the course of the call the physician/provider feels a video visit is not appropriate, you will not be charged for this service.\"    Patient has given verbal consent for Video visit? Yes  How would you like to obtain your AVS? MyChart  If you are dropped from the video visit, the video invite should be resent to: Text to cell phone: [phone]  Will anyone else be joining your video visit? No        Video-Visit Details    Type of service:  Video Visit    Video Start Time: 10:02 AM  Video End Time: 10:37 AM    Originating Location (pt. Location): Home    Distant Location (provider location):  [facility] / [place] [facility]  Platform used for Video Visit: Melodie Amador MD    Chief complaint: Needs to be evaluated for possible sleep apnea prior to bariatric surgery    History of Present Illness: 60-year-old gentleman with history of hypertension and obesity.  He is in the process of being evaluated for bariatric surgery and thinks that he probably has sleep apnea.  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  However, recently with dieting and more attention to sleep he is unable to ensure 7 " to 9 hours of sleep most nights.  A typical work schedule varies over 3 weeks.  He may do 4 days of 2 PM to 10 PM followed by an overnight or an evening shift the second week he would have 1-2 10 PM and then a day off followed by 3 overnights.  Then the final week he would have 10 AM to 6 PM but be back up for nights.  He sleeps in a dark room with a fan which helps block out noise from the house.  Usually takes him 20 minutes to fall asleep.  Usually wakes up once at night.  Sometimes his wife snoring can wake him up.  He thinks he sleeps mostly on his right side.  The house can be somewhat chaotic as they run a  during the day and they have dogs that can bark and this can disrupt his sleep..  He is not taking sleep aids.  Currently he denies excessive daytime sleepiness.  He is not drinking any caffeinated beverages.    He has been told about snoring particularly when he is tired.  He is also been told about pauses in his breathing in the past but not recently.  He thinks he is lost 10 to 20 pounds in the last 4 months.  He does wake up with headaches on occasion this has been longstanding.  He drinks a few beers after playing softball but otherwise no significant alcohol use.    There is no restless leg syndrome, sleepwalking, sleep talking or dream acting behavior that he is aware of.  He does have concerns about things on his face and neck and is concerned about his ability to tolerate CPAP.  He denies any problems breathing through his nose.  He does not know of anybody in the family has history of sleep apnea.  He is adopted.  He is aware that his biological mother has developed diabetes.  His biological father has passed.      Total score - [place]: 3 (10/28/2020  8:00 AM) (Less than 10 normal)    DIALLO Total Score: 5 (normal 0-7, mild 8-14, moderate 15-21, severe 22-28)    STOP-BANG  Loud Snore   1  Excessively Tired/Sleepy   0  Observed apnea   1  Hypertension   1  BMI> 35 kg/m2   1  Age >50   1  Neck  >16 in/40cm   1  (19.5 collar size per pt)  Male Gender   1  Total =   7  (0-2 low, 3-4 intermediate, 5-8 high risk of ANAM)      Past Medical History:   Diagnosis Date     Benign essential hypertension        No Known Allergies    Current Outpatient Medications   Medication     aspirin (ASA) 81 MG EC tablet     Cholecalciferol 125 MCG (5000 UT) TABS     citalopram (CELEXA) 20 MG tablet     lisinopril-hydrochlorothiazide (ZESTORETIC) 20-25 MG tablet     rosuvastatin (CRESTOR) 10 MG tablet     No current facility-administered medications for this visit.        Social History     Socioeconomic History     Marital status:      Spouse name: Not on file     Number of children: Not on file     Years of education: Not on file     Highest education level: Not on file   Occupational History     Not on file   Social Needs     Financial resource strain: Not on file     Food insecurity     Worry: Not on file     Inability: Not on file     Transportation needs     Medical: Not on file     Non-medical: Not on file   Tobacco Use     Smoking status: Never Smoker     Smokeless tobacco: Never Used   Substance and Sexual Activity     Alcohol use: Yes     Frequency: 2-4 times a month     Drinks per session: 1 or 2     Binge frequency: Never     Comment: after softball games     Drug use: Never     Sexual activity: Not on file   Lifestyle     Physical activity     Days per week: Not on file     Minutes per session: Not on file     Stress: Not on file   Relationships     Social connections     Talks on phone: Not on file     Gets together: Not on file     Attends Congregation service: Not on file     Active member of club or organization: Not on file     Attends meetings of clubs or organizations: Not on file     Relationship status: Not on file     Intimate partner violence     Fear of current or ex partner: Not on file     Emotionally abused: Not on file     Physically abused: Not on file     Forced sexual activity: Not on file  "  Other Topics Concern     Not on file   Social History Narrative     Not on file       Family History   Adopted: Yes   Problem Relation Age of Onset     Diabetes Mother        Review of Systems: Positve per HPI, otherwise comprehensive review of systems is negative.    EXAM:  Ht 1.715 m (5' 7.5\")   Wt (!) 154.2 kg (340 lb)   BMI 52.47 kg/m    GENERAL: Healthy, alert and no distress  EYES: Eyes grossly normal to inspection.  No discharge or erythema, or obvious scleral/conjunctival abnormalities.  RESP: No audible wheeze, cough, or visible cyanosis.  No visible retractions or increased work of breathing.    SKIN: Visible skin clear. No significant rash, abnormal pigmentation or lesions.  NEURO: Cranial nerves grossly intact.  Mentation and speech appropriate for age.  PSYCH: Mentation appears normal, affect normal/bright, judgement and insight intact, normal speech and appearance well-groomed.       ASSESSMENT:  60-year-old gentleman with history of obesity, hypertension on medication, snoring and observed apnea.  He also has history of shiftwork and appears to be managing that well at this time. STOP BANG score of 7/8 suggests high risk for obstructive sleep apnea.    PLAN:  Patient was counseled about the pathophysiology of obstructive sleep apnea and the importance of treating sleep apnea should he have it.  We discussed the increased risk of untreated sleep apnea for cardiovascular morbidity and mortality as well as perioperative complications.  Reviewed in lab and home sleep testing.  Recommended home sleep testing, ideally WatchPat One device given his shift work.  He does have a Bluetooth capable smart phone which is required for this test.  Extensive counseling regarding treatment of sleep apnea particularly CPAP.  Patient should have an open mind about this as it is the fastest and most effective treatment option.  We will try to expedite his testing and treatment due to need for surgery.  Please see " patient structures for further details of counseling provided.  Re Amador M.D.  Pulmonary/Critical Care/Sleep Medicine    [facility]   Floor [address], [address]   [address] [place] [address]   Appointments: [phone]    The above note was dictated using voice recognition software and may include typographical errors. Please contact the author for any clarifications.

## 2020-10-28 NOTE — PATIENT INSTRUCTIONS
"MY TREATMENT INFORMATION FOR SLEEP APNEA-  Nick Coley    DOCTOR : Re Amador MD      Am I having a sleep study at a sleep center?      Am I having a home sleep study? Ordered  Watch this video:  /drop off device-   Https://www.Actimagine.com/watch?v=yGGFBdELGhk  Disposable device sent out require phone/computer application-   https://[email or web address].com/watch?v=BCce_vbiwxE  Please verify your insurance coverage with your insurance carrier    Frequently asked questions:  1. What is Obstructive Sleep Apnea (ANAM)? ANAM is the most common type of sleep apnea. Apnea means, \"without breath.\"  Apnea is most often caused by narrowing or collapse of the upper airway as muscles relax during sleep.   Almost everyone has occasional apneas. Most people with sleep apnea have had brief interruptions at night frequently for many years.  The severity of sleep apnea is related to how frequent and severe the events are.   2. What are the consequences of ANAM? Symptoms include: feeling sleepy during the day, snoring loudly, gasping or stopping of breathing, trouble sleeping, and occasionally morning headaches or heartburn at night.  Sleepiness can be serious and even increase the risk of falling asleep while driving. Other health consequences may include development of high blood pressure and other cardiovascular disease in persons who are susceptible. Untreated ANAM  can contribute to heart disease, stroke and diabetes.   3. What are the treatment options? In most situations, sleep apnea is a lifelong disease that must be managed with daily therapy. Medications are not effective for sleep apnea and surgery is generally not considered until other therapies have been tried. Your treatment is your choice . Continuous Positive Airway (CPAP) works right away and is the therapy that is effective in nearly everyone. An oral device to hold your jaw forward is usually the next most reliable option. Other options include postioning " devices (to keep you off your back), weight loss, and surgery including a tongue pacing device. There is more detail about some of these options below.    Important tips for using CPAP and similar devices   Know your equipment:  CPAP is continuous positive airway pressure that prevents obstructive sleep apnea by keeping the throat from collapsing while you are sleeping. In most cases, the device is  smart  and can slowly self-adjusts if your throat collapses and keeps a record every day of how well you are treated-this information is available to you and your care team.  BPAP is bilevel positive airway pressure that keeps your throat open and also assists each breath with a pressure boost to maintain adequate breathing.  Special kinds of BPAP are used in patients who have inadequate breathing from lung or heart disease. In most cases, the device is  smart  and can slowly self-adjusts to assist breathing. Like CPAP, the device keeps a record of how well you are treated.  Your mask is your connection to the device. You get to choose what feels most comfortable and the staff will help to make sure if fits. Here: are some examples of the different masks that are available:       Key points to remember on your journey with sleep apnea:  1. Sleep study.  PAP devices often need to be adjusted during a sleep study to show that they are effective and adjusted right.  2. Good tips to remember: Try wearing just the mask during a quiet time during the day so your body adapts to wearing it. A humidifier is recommended for comfort in most cases to prevent drying of your nose and throat. Allergy medication from your provider may help you if you are having nasal congestion.  3. Getting settled-in. It takes more than one night for most of us to get used to wearing a mask. Try wearing just the mask during a quiet time during the day so your body adapts to wearing it. A humidifier is recommended for comfort in most cases. Our team  will work with you carefully on the first day and will be in contact within 4 days and again at 2 and 4 weeks for advice and remote device adjustments. Your therapy is evaluated by the device each day.   4. Use it every night. The more you are able to sleep naturally for 7-8 hours, the more likely you will have good sleep and to prevent health risks or symptoms from sleep apnea. Even if you use it 4 hours it helps. Occasionally all of us are unable to use a medical therapy, in sleep apnea, it is not dangerous to miss one night.   5. Communicate. Call our skilled team on the number provided on the first day if your visit for problems that make it difficult to wear the device. Over 2 out of 3 patients can learn to wear the device long-term with help from our team. Remember to call our team or your sleep providers if you are unable to wear the device as we may have other solutions for those who cannot adapt to mask CPAP therapy. It is recommended that you sleep your sleep provider within the first 3 months and yearly after that if you are not having problems.   6. Use it for your health. We encourage use of CPAP masks during daytime quiet periods to allow your face and brain to adapt to the sensation of CPAP so that it will be a more natural sensation to awaken to at night or during naps. This can be very useful during the first few weeks or months of adapting to CPAP though it does not help medically to wear CPAP during wakefulness and  should not be used as a strategy just to meet guidelines.  7. Take care of your equipment. Make sure you clean your mask and tubing using directions every day and that your filter and mask are replaced as recommended or if they are not working.     BESIDES CPAP, WHAT OTHER THERAPIES ARE THERE?    Positioning Device  Positioning devices are generally used when sleep apnea is mild and only occurs on your back.This example shows a pillow that straps around the waist. It may be appropriate  for those whose sleep study shows milder sleep apnea that occurs primarily when lying flat on one's back. Preliminary studies have shown benefit but effectiveness at home may need to be verified by a home sleep test. These devices are generally not covered by medical insurance.  Examples of devices that maintain sleeping on the back to prevent snoring and mild sleep apnea.    Belt type body positioner  Http://Gruvi.meQuilibrium/    Electronic reminder  Http://nightshifttherapy.com/  Http://www.EZMove.meQuilibrium.au/      Oral Appliance  What is oral appliance therapy?  An oral appliance device fits on your teeth at night like a retainer used after having braces. The device is made by a specialized dentist and requires several visits over 1-2 months before a manufactured device is made to fit your teeth and is adjusted to prevent your sleep apnea. Once an oral device is working properly, snoring should be improved. A home sleep test may be recommended at that time if to determine whether the sleep apnea is adequately treated.       Some things to remember:  -Oral devices are often, but not always, covered by your medical insurance. Be sure to check with your insurance provider.   -If you are referred for oral therapy, you will be given a list of specialized dentists to consider or you may choose to visit the Web site of the American Academy of Dental Sleep Medicine  -Oral devices are less likely to work if you have severe sleep apnea or are extremely overweight.     More detailed information  An oral appliance is a small acrylic device that fits over the upper and lower teeth  (similar to a retainer or a mouth guard). This device slightly moves jaw forward, which moves the base of the tongue forward, opens the airway, improves breathing for effective treat snoring and obstructive sleep apnea in perhaps 7 out of 10 people .  The best working devices are custom-made by a dental device  after a mold is made of the teeth  1, 2, 3.  When is an oral appliance indicated?  Oral appliance therapy is recommended as a first-line treatment for patients with primary snoring, mild sleep apnea, and for patients with moderate sleep apnea who prefer appliance therapy to use of CPAP4, 5. Severity of sleep apnea is determined by sleep testing and is based on the number of respiratory events per hour of sleep.   How successful is oral appliance therapy?  The success rate of oral appliance therapy in patients with mild sleep apnea is 75-80% while in patients with moderate sleep apnea it is 50-70%. The chance of success in patients with severe sleep apnea is 40-50%. The research also shows that oral appliances have a beneficial effect on the cardiovascular health of ANAM patients at the same magnitude as CPAP therapy7.  Oral appliances should be a second-line treatment in cases of severe sleep apnea, but if not completely successful then a combination therapy utilizing CPAP plus oral appliance therapy may be effective. Oral appliances tend to be effective in a broad range of patients although studies show that the patients who have the highest success are females, younger patients, those with milder disease, and less severe obesity. 3, 6.   Finding a dentist that practices dental sleep medicine  Specific training is available through the American Academy of Dental Sleep Medicine for dentists interested in working in the field of sleep. To find a dentist who is educated in the field of sleep and the use of oral appliances, near you, visit the Web site of the American Academy of Dental Sleep Medicine.    References  1. Doug, et al. Objectively measured vs self-reported compliance during oral appliance therapy for sleep-disordered breathing. Chest 2013; 144(5): 7590-5628.  2. Emir et al. Objective measurement of compliance during oral appliance therapy for sleep-disordered breathing. Thorax 2013; 68(1): 91-96.  3. Eve et al. Mandibular  advancement devices in 620 men and women with ANAM and snoring: tolerability and predictors of treatment success. Chest 2004; 125: 8668-7020.  4. Sukh et al. Oral appliances for snoring and ANAM: a review. Sleep 2006; 29: 244-262.  5. Elif et al. Oral appliance treatment for ANAM: an update. J Clin Sleep Med 2014; 10(2): 215-227.  6. Edgar et al. Predictors of OSAH treatment outcome. J Dent Res 2007; 86: 3980-7257.      Weight Loss:    Weight loss is a long-term strategy that may improve sleep apnea in some patients.    Weight management is a personal decision and the decision should be based on your interest and the potential benefits.  If you are interested in exploring weight loss strategies, the following discussion covers the impact on weight loss on sleep apnea and the approaches that may be successful.    Being overweight does not necessarily mean you will have health consequences.  Those who have BMI over 35 or over 27 with existing medical conditions carries greater risk.   Weight loss decreases severity of sleep apnea in most people with obesity. For those with mild obesity who have developed snoring with weight gain, even 15-30 pound weight loss can improve and occasionally eliminate sleep apnea.  Structured and life-long dietary and health habits are necessary to lose weight and keep healthier weight levels.     Though there may be significant health benefits from weight loss, long-term weight loss is very difficult to achieve- studies show success with dietary management in less than 10% of people. In addition, substantial weight loss may require years of dietary control and may be difficult if patients have severe obesity. In these cases, surgical management may be considered.  Finally, older individuals who have tolerated obesity without health complications may be less likely to benefit from weight loss strategies.        Your BMI is Body mass index is 52.47 kg/m .  Weight management is a  personal decision.  If you are interested in exploring weight loss strategies, the following discussion covers the approaches that may be successful. Body mass index (BMI) is one way to tell whether you are at a healthy weight, overweight, or obese. It measures your weight in relation to your height.  A BMI of 18.5 to 24.9 is in the healthy range. A person with a BMI of 25 to 29.9 is considered overweight, and someone with a BMI of 30 or greater is considered obese. More than two-thirds of American adults are considered overweight or obese.  Being overweight or obese increases the risk for further weight gain. Excess weight may lead to heart disease and diabetes.  Creating and following plans for healthy eating and physical activity may help you improve your health.  Weight control is part of healthy lifestyle and includes exercise, emotional health, and healthy eating habits. Careful eating habits lifelong are the mainstay of weight control. Though there are significant health benefits from weight loss, long-term weight loss with diet alone may be very difficult to achieve- studies show long-term success with dietary management in less than 10% of people. Attaining a healthy weight may be especially difficult to achieve in those with severe obesity. In some cases, medications, devices and surgical management might be considered.  What can you do?  If you are overweight or obese and are interested in methods for weight loss, you should discuss this with your provider.     Consider reducing daily calorie intake by 500 calories.     Keep a food journal.     Avoiding skipping meals, consider cutting portions instead.    Diet combined with exercise helps maintain muscle while optimizing fat loss. Strength training is particularly important for building and maintaining muscle mass. Exercise helps reduce stress, increase energy, and improves fitness. Increasing exercise without diet control, however, may not burn enough  calories to loose weight.       Start walking three days a week 10-20 minutes at a time    Work towards walking thirty minutes five days a week     Eventually, increase the speed of your walking for 1-2 minutes at time    In addition, we recommend that you review healthy lifestyles and methods for weight loss available through the National Institutes of Health patient information sites:  http://win.niddk.nih.gov/publications/index.htm    And look into health and wellness programs that may be available through your health insurance provider, employer, local community center, or umang club.    Weight management plan: Continue plan with Bariatric team      Surgery:    Surgery for obstructive sleep apnea is considered generally only when other therapies fail to work. Surgery may be discussed with you if you are having a difficult time tolerating CPAP and or when there is an abnormal structure that requires surgical correction.  Nose and throat surgeries often enlarge the airway to prevent collapse.  Most of these surgeries create pain for 1-2 weeks and up to half of the most common surgeries are not effective throughout life.  You should carefully discuss the benefits and drawbacks to surgery with your sleep provider and surgeon to determine if it is the best solution for you.   More information  Surgery for ANAM is directed at areas that are responsible for narrowing or complete obstruction of the airway during sleep.  There are a wide range of procedures available to enlarge and/or stabilize the airway to prevent blockage of breathing in the three major areas where it can occur: the palate, tongue, and nasal regions.  Successful surgical treatment depends on the accurate identification of the factors responsible for obstructive sleep apnea in each person.  A personalized approach is required because there is no single treatment that works well for everyone.  Because of anatomic variation, consultation with an  examination by a sleep surgeon is a critical first step in determining what surgical options are best for each patient.  In some cases, examination during sedation may be recommended in order to guide the selection of procedures.  Patients will be counseled about risks and benefits as well as the typical recovery course after surgery. Surgery is typically not a cure for a person s ANAM.  However, surgery will often significantly improve one s ANAM severity (termed  success rate ).  Even in the absence of a cure, surgery will decrease the cardiovascular risk associated with OSA7; improve overall quality of life8 (sleepiness, functionality, sleep quality, etc).      Palate Procedures:  Patients with ANAM often have narrowing of their airway in the region of their tonsils and uvula.  The goals of palate procedures are to widen the airway in this region as well as to help the tissues resist collapse.  Modern palate procedure techniques focus on tissue conservation and soft tissue rearrangement, rather than tissue removal.  Often the uvula is preserved in this procedure. Residual sleep apnea is common in patient after pharyngoplasty with an average reduction in sleep apnea events of 33%2.      Tongue Procedures:  ExamWhile patients are awake, the muscles that surround the throat are active and keep this region open for breathing. These muscles relax during sleep, allowing the tongue and other structures to collapse and block breathing.  There are several different tongue procedures available.  Selection of a tongue base procedure depends on characteristics seen on physical exam.  Generally, procedures are aimed at removing bulky tissues in this area or preventing the back of the tongue from falling back during sleep.  Success rates for tongue surgery range from 50-62%3.    Hypoglossal Nerve Stimulation:  Hypoglossal nerve stimulation has recently received approval from the [place] Food and Drug Administration for the  treatment of obstructive sleep apnea.  This is based on research showing that the system was safe and effective in treating sleep apnea6.  Results showed that the median AHI score decreased 68%, from 29.3 to 9.0. This therapy uses an implant system that senses breathing patterns and delivers mild stimulation to airway muscles, which keeps the airway open during sleep.  The system consists of three fully implanted components: a small generator (similar in size to a pacemaker), a breathing sensor, and a stimulation lead.  Using a small handheld remote, a patient turns the therapy on before bed and off upon awakening.    Candidates for this device must be greater than 22 years of age, have moderate to severe ANAM (AHI between 20-65), BMI less than 32, have tried CPAP/oral appliance without success, and have appropriate upper airway anatomy (determined by a sleep endoscopy performed by Dr. Carpenter).    Hypoglossal Nerve Stimulation Pathway:    The sleep surgeon s office will work with the patient through the insurance prior-authorization process (including communications and appeals).    Nasal Procedures:  Nasal obstruction can interfere with nasal breathing during the day and night.  Studies have shown that relief of nasal obstruction can improve the ability of some patients to tolerate positive airway pressure therapy for obstructive sleep apnea1.  Treatment options include medications such as nasal saline, topical corticosteroid and antihistamine sprays, and oral medications such as antihistamines or decongestants. Non-surgical treatments can include external nasal dilators for selected patients. If these are not successful by themselves, surgery can improve the nasal airway either alone or in combination with these other options.      Combination Procedures:  Combination of surgical procedures and other treatments may be recommended, particularly if patients have more than one area of narrowing or persistent positional  disease.  The success rate of combination surgery ranges from 66-80%2,3.    References  1. Anahi MARQUEZ. The Role of the Nose in Snoring and Obstructive Sleep Apnoea: An Update.  Eur Arch Otorhinolaryngol. 2011; 268: 1365-73.  2.  Venkatesh SM; Savanna JA; Reinaldo JR; Pallanch JF; Bernice MB; Magno SG; Chaka SANDERS. Surgical modifications of the upper airway for obstructive sleep apnea in adults: a systematic review and meta-analysis. SLEEP 2010;33(10):8593-7790. Sintia LANCASTER. Hypopharyngeal surgery in obstructive sleep apnea: an evidence-based medicine review.  Arch Otolaryngol Head Neck Surg. 2006 Feb;132(2):206-13.  3. Sony YH1, Kwesi Y, Familia JOSSY. The efficacy of anatomically based multilevel surgery for obstructive sleep apnea. Otolaryngol Head Neck Surg. 2003 Oct;129(4):327-35.  4. Sintia LANCASTER, Goldberg A. Hypopharyngeal Surgery in Obstructive Sleep Apnea: An Evidence-Based Medicine Review. Arch Otolaryngol Head Neck Surg. 2006 Feb;132(2):206-13.  5. Celia PJ et al. Upper-Airway Stimulation for Obstructive Sleep Apnea.  N Engl J Med. 2014 Jan 9;370(2):139-49.  6. Gabi Y et al. Increased Incidence of Cardiovascular Disease in Middle-aged Men with Obstructive Sleep Apnea. Am J Respir Crit Care Med; 2002 166: 159-165  7. Odalys YU et al. Studying Life Effects and Effectiveness of Palatopharyngoplasty (SLEEP) study: Subjective Outcomes of Isolated Uvulopalatopharyngoplasty. Otolaryngol Head Neck Surg. 2011; 144: 623-631.

## 2020-10-29 ENCOUNTER — OFFICE VISIT - HEALTHEAST (OUTPATIENT)
Dept: SURGERY | Facility: CLINIC | Age: 60
End: 2020-10-29

## 2020-10-29 DIAGNOSIS — E66.01 MORBID OBESITY (H): ICD-10-CM

## 2020-11-05 ENCOUNTER — OFFICE VISIT - HEALTHEAST (OUTPATIENT)
Dept: INTERNAL MEDICINE | Facility: CLINIC | Age: 60
End: 2020-11-05

## 2020-11-05 DIAGNOSIS — F32.1 MODERATE MAJOR DEPRESSION (H): ICD-10-CM

## 2020-11-05 DIAGNOSIS — Z12.5 ENCOUNTER FOR PROSTATE CANCER SCREENING: ICD-10-CM

## 2020-11-05 DIAGNOSIS — Z23 NEED FOR IMMUNIZATION AGAINST INFLUENZA: ICD-10-CM

## 2020-11-05 DIAGNOSIS — Z86.0100 HISTORY OF COLON POLYPS: ICD-10-CM

## 2020-11-05 DIAGNOSIS — R79.89 ABNORMAL LFTS (LIVER FUNCTION TESTS): ICD-10-CM

## 2020-11-05 DIAGNOSIS — Z00.00 ROUTINE GENERAL MEDICAL EXAMINATION AT A HEALTH CARE FACILITY: ICD-10-CM

## 2020-11-05 DIAGNOSIS — I10 ESSENTIAL HYPERTENSION: ICD-10-CM

## 2020-11-05 DIAGNOSIS — E78.5 HYPERLIPIDEMIA, UNSPECIFIED HYPERLIPIDEMIA TYPE: ICD-10-CM

## 2020-11-05 DIAGNOSIS — E55.9 VITAMIN D DEFICIENCY: ICD-10-CM

## 2020-11-05 DIAGNOSIS — R29.818 SUSPECTED SLEEP APNEA: ICD-10-CM

## 2020-11-05 DIAGNOSIS — E66.01 MORBID OBESITY (H): ICD-10-CM

## 2020-11-05 LAB
CHOLEST SERPL-MCNC: 281 MG/DL
FASTING STATUS PATIENT QL REPORTED: YES
HDLC SERPL-MCNC: 40 MG/DL
LDLC SERPL CALC-MCNC: 192 MG/DL
PSA SERPL-MCNC: 0.4 NG/ML (ref 0–3.5)
TRIGL SERPL-MCNC: 243 MG/DL

## 2020-11-05 ASSESSMENT — PATIENT HEALTH QUESTIONNAIRE - PHQ9: SUM OF ALL RESPONSES TO PHQ QUESTIONS 1-9: 3

## 2020-11-05 ASSESSMENT — MIFFLIN-ST. JEOR: SCORE: 2327.86

## 2020-11-07 ENCOUNTER — AMBULATORY - HEALTHEAST (OUTPATIENT)
Dept: INTERNAL MEDICINE | Facility: CLINIC | Age: 60
End: 2020-11-07

## 2020-11-09 ENCOUNTER — COMMUNICATION - HEALTHEAST (OUTPATIENT)
Dept: INTERNAL MEDICINE | Facility: CLINIC | Age: 60
End: 2020-11-09

## 2020-11-09 ENCOUNTER — OFFICE VISIT - HEALTHEAST (OUTPATIENT)
Dept: SURGERY | Facility: CLINIC | Age: 60
End: 2020-11-09

## 2020-11-09 DIAGNOSIS — Z71.3 NUTRITIONAL COUNSELING: ICD-10-CM

## 2020-11-09 DIAGNOSIS — E78.2 MIXED HYPERLIPIDEMIA: ICD-10-CM

## 2020-11-09 DIAGNOSIS — E66.01 MORBID OBESITY (H): ICD-10-CM

## 2020-11-09 DIAGNOSIS — I10 HYPERTENSION, UNSPECIFIED TYPE: ICD-10-CM

## 2020-11-10 ENCOUNTER — COMMUNICATION - HEALTHEAST (OUTPATIENT)
Dept: INTERNAL MEDICINE | Facility: CLINIC | Age: 60
End: 2020-11-10

## 2020-11-11 ENCOUNTER — OFFICE VISIT - HEALTHEAST (OUTPATIENT)
Dept: SURGERY | Facility: CLINIC | Age: 60
End: 2020-11-11

## 2020-11-11 DIAGNOSIS — E66.01 MORBID OBESITY (H): ICD-10-CM

## 2020-11-16 ENCOUNTER — HEALTH MAINTENANCE LETTER (OUTPATIENT)
Age: 60
End: 2020-11-16

## 2020-12-02 ENCOUNTER — OFFICE VISIT - HEALTHEAST (OUTPATIENT)
Dept: FAMILY MEDICINE | Facility: CLINIC | Age: 60
End: 2020-12-02

## 2020-12-02 DIAGNOSIS — E78.2 MIXED HYPERLIPIDEMIA: ICD-10-CM

## 2020-12-02 DIAGNOSIS — I10 ESSENTIAL HYPERTENSION: ICD-10-CM

## 2020-12-02 DIAGNOSIS — Z71.3 NUTRITIONAL COUNSELING: ICD-10-CM

## 2020-12-02 DIAGNOSIS — E66.01 MORBID OBESITY (H): ICD-10-CM

## 2020-12-03 ENCOUNTER — OFFICE VISIT - HEALTHEAST (OUTPATIENT)
Dept: INTERNAL MEDICINE | Facility: CLINIC | Age: 60
End: 2020-12-03

## 2020-12-03 ENCOUNTER — COMMUNICATION - HEALTHEAST (OUTPATIENT)
Dept: INTERNAL MEDICINE | Facility: CLINIC | Age: 60
End: 2020-12-03

## 2020-12-03 DIAGNOSIS — E66.01 MORBID OBESITY (H): ICD-10-CM

## 2020-12-03 DIAGNOSIS — N20.0 NEPHROLITHIASIS: ICD-10-CM

## 2020-12-03 DIAGNOSIS — R10.9 FLANK PAIN: ICD-10-CM

## 2020-12-03 LAB
ALBUMIN UR-MCNC: ABNORMAL MG/DL
ANION GAP SERPL CALCULATED.3IONS-SCNC: 11 MMOL/L (ref 5–18)
APPEARANCE UR: CLEAR
BACTERIA #/AREA URNS HPF: ABNORMAL HPF
BASOPHILS # BLD AUTO: 0 THOU/UL (ref 0–0.2)
BASOPHILS NFR BLD AUTO: 1 % (ref 0–2)
BILIRUB UR QL STRIP: NEGATIVE
BUN SERPL-MCNC: 17 MG/DL (ref 8–22)
CALCIUM SERPL-MCNC: 9.9 MG/DL (ref 8.5–10.5)
CHLORIDE BLD-SCNC: 104 MMOL/L (ref 98–107)
CO2 SERPL-SCNC: 28 MMOL/L (ref 22–31)
COLOR UR AUTO: YELLOW
CREAT SERPL-MCNC: 1.14 MG/DL (ref 0.7–1.3)
EOSINOPHIL # BLD AUTO: 0.2 THOU/UL (ref 0–0.4)
EOSINOPHIL NFR BLD AUTO: 3 % (ref 0–6)
ERYTHROCYTE [DISTWIDTH] IN BLOOD BY AUTOMATED COUNT: 12.2 % (ref 11–14.5)
GFR SERPL CREATININE-BSD FRML MDRD: >60 ML/MIN/1.73M2
GLUCOSE BLD-MCNC: 94 MG/DL (ref 70–125)
GLUCOSE UR STRIP-MCNC: NEGATIVE MG/DL
HCT VFR BLD AUTO: 46.1 % (ref 40–54)
HGB BLD-MCNC: 15.3 G/DL (ref 14–18)
HGB UR QL STRIP: ABNORMAL
KETONES UR STRIP-MCNC: NEGATIVE MG/DL
LEUKOCYTE ESTERASE UR QL STRIP: ABNORMAL
LYMPHOCYTES # BLD AUTO: 2.4 THOU/UL (ref 0.8–4.4)
LYMPHOCYTES NFR BLD AUTO: 35 % (ref 20–40)
MCH RBC QN AUTO: 29.4 PG (ref 27–34)
MCHC RBC AUTO-ENTMCNC: 33.3 G/DL (ref 32–36)
MCV RBC AUTO: 89 FL (ref 80–100)
MONOCYTES # BLD AUTO: 0.4 THOU/UL (ref 0–0.9)
MONOCYTES NFR BLD AUTO: 5 % (ref 2–10)
NEUTROPHILS # BLD AUTO: 3.8 THOU/UL (ref 2–7.7)
NEUTROPHILS NFR BLD AUTO: 56 % (ref 50–70)
NITRATE UR QL: NEGATIVE
PH UR STRIP: 5.5 [PH] (ref 5–8)
PLATELET # BLD AUTO: 207 THOU/UL (ref 140–440)
PMV BLD AUTO: 7.9 FL (ref 7–10)
POTASSIUM BLD-SCNC: 4.7 MMOL/L (ref 3.5–5)
RBC # BLD AUTO: 5.2 MILL/UL (ref 4.4–6.2)
RBC #/AREA URNS AUTO: ABNORMAL HPF
SODIUM SERPL-SCNC: 143 MMOL/L (ref 136–145)
SP GR UR STRIP: 1.02 (ref 1–1.03)
SQUAMOUS #/AREA URNS AUTO: ABNORMAL LPF
UROBILINOGEN UR STRIP-ACNC: ABNORMAL
WBC #/AREA URNS AUTO: ABNORMAL HPF
WBC: 6.9 THOU/UL (ref 4–11)

## 2020-12-04 ENCOUNTER — HOSPITAL ENCOUNTER (OUTPATIENT)
Dept: CT IMAGING | Facility: CLINIC | Age: 60
Discharge: HOME | End: 2020-12-04
Attending: INTERNAL MEDICINE

## 2020-12-04 ENCOUNTER — COMMUNICATION - HEALTHEAST (OUTPATIENT)
Dept: INTERNAL MEDICINE | Facility: CLINIC | Age: 60
End: 2020-12-04

## 2020-12-04 DIAGNOSIS — N20.0 STAGHORN RENAL CALCULUS: ICD-10-CM

## 2020-12-04 DIAGNOSIS — R10.9 FLANK PAIN: ICD-10-CM

## 2020-12-04 DIAGNOSIS — N20.0 NEPHROLITHIASIS: ICD-10-CM

## 2020-12-04 LAB — BACTERIA SPEC CULT: NO GROWTH

## 2020-12-07 ENCOUNTER — RECORDS - HEALTHEAST (OUTPATIENT)
Dept: ADMINISTRATIVE | Facility: OTHER | Age: 60
End: 2020-12-07

## 2020-12-10 ENCOUNTER — TELEPHONE (OUTPATIENT)
Dept: SLEEP MEDICINE | Facility: CLINIC | Age: 60
End: 2020-12-10

## 2020-12-11 ENCOUNTER — COMMUNICATION - HEALTHEAST (OUTPATIENT)
Dept: INTERNAL MEDICINE | Facility: CLINIC | Age: 60
End: 2020-12-11

## 2020-12-11 DIAGNOSIS — F32.1 MODERATE MAJOR DEPRESSION (H): ICD-10-CM

## 2020-12-18 ENCOUNTER — OFFICE VISIT (OUTPATIENT)
Dept: SLEEP MEDICINE | Facility: CLINIC | Age: 60
End: 2020-12-18
Payer: COMMERCIAL

## 2020-12-18 DIAGNOSIS — G47.33 OSA (OBSTRUCTIVE SLEEP APNEA): ICD-10-CM

## 2020-12-18 DIAGNOSIS — G47.30 OBSERVED SLEEP APNEA: ICD-10-CM

## 2020-12-18 DIAGNOSIS — E66.01 MORBID OBESITY (H): ICD-10-CM

## 2020-12-18 DIAGNOSIS — G47.26 SHIFTING SLEEP-WORK SCHEDULE: ICD-10-CM

## 2020-12-18 DIAGNOSIS — R06.83 SNORING: Primary | ICD-10-CM

## 2020-12-18 PROCEDURE — 95800 SLP STDY UNATTENDED: CPT | Performed by: INTERNAL MEDICINE

## 2020-12-28 ENCOUNTER — COMMUNICATION - HEALTHEAST (OUTPATIENT)
Dept: ADMINISTRATIVE | Facility: CLINIC | Age: 60
End: 2020-12-28

## 2020-12-28 NOTE — PROGRESS NOTES
Device has been registered and shipped via Virtual [employer] on 12/28/2020. Patient was notified that package was mailed out.       Called and lvm regarding watchpat mail out delay due to fedex supply.     Ronit Pierre MA on 12/28/2020 at 2:48 PM

## 2021-01-04 ENCOUNTER — OFFICE VISIT - HEALTHEAST (OUTPATIENT)
Dept: SURGERY | Facility: CLINIC | Age: 61
End: 2021-01-04

## 2021-01-04 DIAGNOSIS — E66.01 OBESITY, MORBID, BMI 50 OR HIGHER (H): ICD-10-CM

## 2021-01-04 DIAGNOSIS — I10 HYPERTENSION, UNSPECIFIED TYPE: ICD-10-CM

## 2021-01-04 DIAGNOSIS — Z71.3 NUTRITIONAL COUNSELING: ICD-10-CM

## 2021-01-04 DIAGNOSIS — E78.2 MIXED HYPERLIPIDEMIA: ICD-10-CM

## 2021-01-04 NOTE — PROGRESS NOTES
WATCHPAT was scored using 1B 4% hypopnea rule and it is ready for interpretation.     PAHI: 40.7. Snoring was reported as mild.  Time with SpO2 below 89% was 0 minutes.   Overall signal quality was good     Pt will follow up with sleep provider to determine appropriate therapy.     Ordering Provider, Re Amador MD Charles O., BA, [facility], [facility] Clinical Specialist 1/4/2021

## 2021-01-05 PROBLEM — G47.33 OSA (OBSTRUCTIVE SLEEP APNEA): Status: ACTIVE | Noted: 2021-01-05

## 2021-01-06 NOTE — PROCEDURES
"WatchPAT - HOME SLEEP STUDY INTERPRETATION    Patient: Nick Coley  MRN: [id number]  YOB: 1960  Study Date: 1/2/2021  Referring Provider: Long Weston MD  Ordering Provider: Re Amador MD    Chain of custody patient verification was not enabled.       Indications for Home Study: Nick Coley is a 60 year old male with a history of hypertension and obesity who presents with symptoms suggestive of obstructive sleep apnea.    Estimated body mass index is 52.47 kg/m  as calculated from the following:    Height as of 10/27/20: 1.715 m (5' 7.5\").    Weight as of 10/27/20: 154.2 kg (340 lb).  Total score - [place]: 3 (10/28/2020  8:00 AM)  StopBang Total Score: 7 (10/28/2020  8:00 AM)    Data: A full night home sleep study was performed recording the standard physiologic parameters including peripheral arterial tonometry (PAT), sound/snoring, body position,  movement, sound, and oxygen saturation by pulse oximetry. Pulse rate was estimated by oximetry recording. Sleep staging (wake, REM, light, and deep sleep) was derived from PAT signal.  This study was considered adequate based on > 4 hours of quality oximetry and respiratory recording. As specified by the AASM Manual for the Scoring of Sleep and Associated events, version 2.3, Rule VIII.D 1B, 4% oxygen desaturation scoring for hypopneas is used as a standard of care on all home sleep apnea testing.    Total Recording Time: 8 hrs, 14 min  Total Sleep Time: 7 hrs, 27 min  % of Sleep Time REM: 21%    Respiratory:  Snoring: Snoring was present.  Respiratory events: The PAT respiratory disturbance index [pRDI] was 41.5 events per hour.  The PAT apnea/hypopnea index [pAHI] was 40.7 events per hour.  FERNANDA was 39.1 events per hour.  During REM sleep the pAHI was 41.9.  Sleep Associated Hypoxemia: sustained hypoxemia was present. Mean oxygen saturation was 93%.  Minimum was 77%.  Time with saturation less than 88% was 12.2 minutes.    Heart " Rate: By pulse oximetry normal rate was noted.     Position: Percent of time spent: supine - 5%, prone - 26%, on right - 68%, on left - 0%.  pAHI was 43.4 per hour supine, 27.1 per hour prone, 45.7 per hour on right side, and NA per hour on left side.     Assessment:   Severe obstructive sleep apnea.  Sleep associated hypoxemia was present.    Recommendations:  Consider auto-CPAP at 5-15 cmH2O.  Suggest optimizing sleep hygiene and avoiding sleep deprivation.  Weight management.    Diagnosis Code(s): Obstructive Sleep Apnea G47.33, Hypoxemia G47.36    Re Amador MD, January 5, 2021   Diplomate, American Board of Internal Medicine, Sleep Medicine

## 2021-01-07 VITALS — BODY MASS INDEX: 46.65 KG/M2 | WEIGHT: 315 LBS | HEIGHT: 69 IN

## 2021-01-07 ASSESSMENT — MIFFLIN-ST. JEOR: SCORE: 2347.22

## 2021-01-07 NOTE — PATIENT INSTRUCTIONS
For general sleep health questions: http://sleepeducation.org    For tips about PAP and COVID -19:  https://www.thoracic.org/patients/patient-resources/resources/covid-19-and-home-pap-therapy.pdf        Continue PAP therapy every night, for all hours that you are sleeping.  If you nap use CPAP.  As always, try to get at least 8 hours of sleep or more each day, keep a regular sleep schedule, and avoid sleep deprivation. Avoid alcohol.    Reasons that you might need a change to your pressure therapy would be weight gain or loss, waking having inadvertently removed your PAP overnight, having previously felt refreshed by sleep with CPAP use and now waking un-refreshed, and return of daytime sleepiness. Also, the development of new medical problems  (such as heart failure, stroke, medications such as narcotics) can sometimes affect breathing at night and change your PAP therapy needs.    Please bring PAP with you if you are hospitalized.  If anticipating surgery be sure to discuss with your surgeon that you have sleep apnea and use PAP therapy.      Maintain your equipment as recommended which includes routine cleaning and replacement of supplies.      Call DME for any questions regarding supplies or maintenance.    [facility], [facility] [phone]      Do not drive on engage in potentially dangerous activities if feeling sleepy.    Please follow up in sleep clinic again in 2 months and bring your machine and card to your follow-up visit.          Tips for your PAP use-    Mask fitting tips  Mask fitting exercise:    To improve your mask seal and your mobility at night, put mask on and secure in place.  Lie down in bed with full pressure and roll to one side, adjust headgear while in that position to eliminate any leaks. Repeat process rolling to other side.     The mask seal does not have to be perfect:   CPAP machines are designed to make up for small leaks. However, you  will not tolerate leaks blowing in your eyes so you will need to adjust.   Any leak should only be near or at the bottom of the mask.  We expect your mask to leak slightly at night.    Do not over-tighten the headgear straps, tighter IS NOT better, we expect minimal leak.    First try re-positioning the mask or headgear before tightening the headgear straps.  Mask leaks are expected due to changing sleeping positions. Try pulling the mask away from your skin allowing the cushion to re-inflate will minimize the leak.  If you struggle for a good fit, try turning the CPAP off and then readjust the mask by pulling it away from your face and then turning back on the CPAP.        Humidifier tips  Humidifiers can be adjusted to increase or decrease the amount of moisture according to your comfort level. You may need to adjust this frequently at first, but then might only change it with seasonal weather changes.     Try INCREASING the humidity if:  You experience a dry, irritated nasal passage or throat.  You have a runny, drippy nose or sneezing fits after using CPAP.  You experience nasal congestion during or after CPAP use.    Try DECREASING the humidity if:  You have excessive condensation or  rain out  in the tubing or mask.  Otherwise keep the tubing warm during the night by running it underneath the blankets or pillow.      Clinic visit after initial PAP set-up   Bring your equipment with you to your 4 week follow up clinic visit.  We will be extracting your data from the machine.        Travel  Always take your equipment with you.  If you fly with your equipment bring it on with you as a carry on.  Medical equipment does not count as a carry on.    If you travel international the machines take 110-240.  The only adapter needed is the adapter that will fit into the receptacle (outlet).    You may also want to bring an extension cord as many hotel rooms have limited outlets at the bedside.  Do not travel with water in  your humidifier chamber.     Cleaning and Maintenance Guidelines    Equipment Frequency Cleaning Method   Mask First Day    Daily      Weekly Soak mask in hot soapy water for 30 minutes, rinse and air dry.  Wipe nasal cushion with a hot soapy (Ivory, baby shampoo) cloth and rinse.  Baby wipes may also be used.  Do not use anti-bacterial soaps,Mallory  liquid soap, rubbing alcohol, bleach or ammonia.  Wash frame in hot soapy water (Ivory, baby shampoo) rinse and let air dry   Headgear Biweekly Wash in hot soapy water, rinse and air dry   Reusable Gray Filter Weekly Wash in hot soapy water, rinse, put in towel squeeze moisture out, let air dry   Disposable White Filter Check Weekly Replace when brown or gray in color; at least every 2 to 3 months   Humidifier Chamber Daily    Weekly Empty distilled water from humidifier and let air dry    Hand wash in hot soapy water, rinse and air dry   Tubing Weekly Wash in hot soapy water, rinse and let air dry   Mask, Tubing and Humidifier Chamber As needed Disinfect: Soak in 1 part distilled white vinegar to 3 parts hot water for 30 minutes, rinse well and air dry  Not the material headgear        MASK AND SUPPLY REORDERING and EQUIPMENT NEEDS through your DME and per your insurance  Reminder: Most insurance companies will allow for a new mask, headgear, tubing, and reusable gray filter every six months.  Disposable white ultra-fine filters are covered monthly.      HOME AND SAFETY INSTRUCTIONS    Do not use frayed or cracked electrical cords, multi plug adaptors, or switched receptacles    Do not immerse electrical equipment into water    Assure that electrical cords do not become a tripping hazard    Your BMI is Body mass index is 50.71 kg/m .  Weight management is a personal decision.  If you are interested in exploring weight loss strategies, the following discussion covers the approaches that may be successful. Body mass index (BMI) is one way to tell whether you are at a  healthy weight, overweight, or obese. It measures your weight in relation to your height.  A BMI of 18.5 to 24.9 is in the healthy range. A person with a BMI of 25 to 29.9 is considered overweight, and someone with a BMI of 30 or greater is considered obese. More than two-thirds of American adults are considered overweight or obese.  Being overweight or obese increases the risk for further weight gain. Excess weight may lead to heart disease and diabetes.  Creating and following plans for healthy eating and physical activity may help you improve your health.  Weight control is part of healthy lifestyle and includes exercise, emotional health, and healthy eating habits. Careful eating habits lifelong are the mainstay of weight control. Though there are significant health benefits from weight loss, long-term weight loss with diet alone may be very difficult to achieve- studies show long-term success with dietary management in less than 10% of people. Attaining a healthy weight may be especially difficult to achieve in those with severe obesity. In some cases, medications, devices and surgical management might be considered.  What can you do?  If you are overweight or obese and are interested in methods for weight loss, you should discuss this with your provider.     Consider reducing daily calorie intake by 500 calories.     Keep a food journal.     Avoiding skipping meals, consider cutting portions instead.    Diet combined with exercise helps maintain muscle while optimizing fat loss. Strength training is particularly important for building and maintaining muscle mass. Exercise helps reduce stress, increase energy, and improves fitness. Increasing exercise without diet control, however, may not burn enough calories to loose weight.       Start walking three days a week 10-20 minutes at a time    Work towards walking thirty minutes five days a week     Eventually, increase the speed of your walking for 1-2 minutes at  time        Weight management plan: Continue woking with bariatric medicine

## 2021-01-07 NOTE — PROGRESS NOTES
Nick is a 60 year old who is being evaluated via a billable video visit.      How would you like to obtain your AVS? MyChart  If the video visit is dropped, the invitation should be resent by: Text to cell phone: [phone]  Will anyone else be joining your video visit? No      Video Start Time: 9:02 AM  Assessment & Plan   Problem List Items Addressed This Visit     Morbid obesity (H)    ANAM (obstructive sleep apnea) - Primary      Sleep apnea severe.  Medically indicated to treat.  Needs to be compliant and getting benefit with CPAP prior to proceeding with bariatric surgery.  CPAP set up was already ordered but has not yet been arranged.  Patient will be contacted to set this up.  Urged him to consider mask and tubing set up that would be the least restrictive.  He will be enrolled in the sleep therapy management program and follow-up with me in 7 weeks.  Patient is can take CPAP with him when he goes to procedures and will continue his efforts at weight loss with the bariatric program.  He is agreeable with this plan.          34 minutes spent on the date of the encounter doing chart review, history and exam, documentation and further activities as noted above           Re Amador MD  [facility] [place]    Flaquito Romero is a 60 year old who presents to clinic today for the following health issues follow up home sleep apnea testing    HPI   60-year-old gentleman with hypertension, obesity, being evaluated for bariatric surgery.  He presented with loud snoring and observed apneas.  He is here for results of home sleep apnea testing.  Since his last visit has been diagnosed with right-sided kidney stone and needs to have surgery which has not yet been arranged.  The results of the watch Pat test were reviewed with him in detail did show severe sleep apnea.  He slept primarily on his right side.  He does have concerns about CPAP.  Is a little bit worried about the  constriction that it might cause him to feel.  He is worried about having something on his face.  He also has a full beard.    Total score - [place]: 3 (1/7/2021  3:00 PM) (Less than 10 normal)    DIALLO Total Score: 5 (normal 0-7, mild 8-14, moderate 15-21, severe 22-28)          Objective           Vitals:  No vitals were obtained today due to virtual visit.    Physical Exam   GENERAL: Healthy, alert and no distress, obese, full beard  EYES: Eyes grossly normal to inspection.  No discharge or erythema, or obvious scleral/conjunctival abnormalities.  RESP: No audible wheeze, cough, or visible cyanosis.  No visible retractions or increased work of breathing.    SKIN: Visible skin clear. No significant rash, abnormal pigmentation or lesions.  NEURO: Cranial nerves grossly intact.  Mentation and speech appropriate for age.  PSYCH: Mentation appears normal, affect normal/bright, judgement and insight intact, normal speech and appearance well-groomed.    HSAT WatchPat  1/2/2021  Weight 340 lbs, BMI 52.47  pAHI 40.7, pRDI41.5, Lowest O2 sat 77%  Time with O2 sat at/below 88% 12.2 min      Video-Visit Details    Type of service:  Video Visit    Video End Time:9:25 AM    Originating Location (pt. Location): Home    Distant Location (provider location):  Home Office    Platform used for Video Visit: Melodie                  The above note was dictated using voice recognition software and may include typographical errors. Please contact the author for any clarifications.

## 2021-01-08 ENCOUNTER — VIRTUAL VISIT (OUTPATIENT)
Dept: SLEEP MEDICINE | Facility: CLINIC | Age: 61
End: 2021-01-08
Payer: COMMERCIAL

## 2021-01-08 DIAGNOSIS — G47.33 OSA (OBSTRUCTIVE SLEEP APNEA): Primary | ICD-10-CM

## 2021-01-08 DIAGNOSIS — E66.01 MORBID OBESITY (H): ICD-10-CM

## 2021-01-08 PROCEDURE — 99214 OFFICE O/P EST MOD 30 MIN: CPT | Mod: 95 | Performed by: INTERNAL MEDICINE

## 2021-01-08 NOTE — LETTER
1/8/2021         RE: Nick Coley  [address] [place] [address]        Dear Colleague,    Thank you for referring your patient, Nick Coley, to the [facility] [place]. Please see a copy of my visit note below.    Nick is a 60 year old who is being evaluated via a billable video visit.      How would you like to obtain your AVS? MyChart  If the video visit is dropped, the invitation should be resent by: Text to cell phone: [phone]  Will anyone else be joining your video visit? No      Video Start Time: 9:02 AM  Assessment & Plan   Problem List Items Addressed This Visit     Morbid obesity (H)    ANAM (obstructive sleep apnea) - Primary      Sleep apnea severe.  Medically indicated to treat.  Needs to be compliant and getting benefit with CPAP prior to proceeding with bariatric surgery.  CPAP set up was already ordered but has not yet been arranged.  Patient will be contacted to set this up.  Urged him to consider mask and tubing set up that would be the least restrictive.  He will be enrolled in the sleep therapy management program and follow-up with me in 7 weeks.  Patient is can take CPAP with him when he goes to procedures and will continue his efforts at weight loss with the bariatric program.  He is agreeable with this plan.          34 minutes spent on the date of the encounter doing chart review, history and exam, documentation and further activities as noted above           Re Amador MD  [facility] [place]    Subjective     Nick is a 60 year old who presents to clinic today for the following health issues follow up home sleep apnea testing    HPI   60-year-old gentleman with hypertension, obesity, being evaluated for bariatric surgery.  He presented with loud snoring and observed apneas.  He is here for results of home sleep apnea testing.  Since his last visit has been diagnosed with right-sided kidney stone and needs to  have surgery which has not yet been arranged.  The results of the watch Pat test were reviewed with him in detail did show severe sleep apnea.  He slept primarily on his right side.  He does have concerns about CPAP.  Is a little bit worried about the constriction that it might cause him to feel.  He is worried about having something on his face.  He also has a full beard.    Total score - [place]: 3 (1/7/2021  3:00 PM) (Less than 10 normal)    DIALLO Total Score: 5 (normal 0-7, mild 8-14, moderate 15-21, severe 22-28)          Objective           Vitals:  No vitals were obtained today due to virtual visit.    Physical Exam   GENERAL: Healthy, alert and no distress, obese, full beard  EYES: Eyes grossly normal to inspection.  No discharge or erythema, or obvious scleral/conjunctival abnormalities.  RESP: No audible wheeze, cough, or visible cyanosis.  No visible retractions or increased work of breathing.    SKIN: Visible skin clear. No significant rash, abnormal pigmentation or lesions.  NEURO: Cranial nerves grossly intact.  Mentation and speech appropriate for age.  PSYCH: Mentation appears normal, affect normal/bright, judgement and insight intact, normal speech and appearance well-groomed.    HSAT WatchPat  1/2/2021  Weight 340 lbs, BMI 52.47  pAHI 40.7, pRDI41.5, Lowest O2 sat 77%  Time with O2 sat at/below 88% 12.2 min      Video-Visit Details    Type of service:  Video Visit    Video End Time:9:25 AM    Originating Location (pt. Location): Home    Distant Location (provider location):  Home Office    Platform used for Video Visit: [facility]                  The above note was dictated using voice recognition software and may include typographical errors. Please contact the author for any clarifications.                Again, thank you for allowing me to participate in the care of your patient.        Sincerely,        Re Amador MD

## 2021-01-13 ENCOUNTER — DOCUMENTATION ONLY (OUTPATIENT)
Dept: SLEEP MEDICINE | Facility: CLINIC | Age: 61
End: 2021-01-13

## 2021-01-14 ENCOUNTER — TELEPHONE (OUTPATIENT)
Dept: SLEEP MEDICINE | Facility: CLINIC | Age: 61
End: 2021-01-14

## 2021-01-14 NOTE — TELEPHONE ENCOUNTER
CALLED PT AND SCHEDULED THE PT FOR AN IN-PERSON INITIAL CPAP SETUP APPT AT THE [facility] 01/22/2021 9AM. CONFIRMED LOCATION, TIME AND DATE WITH THE PT. ALSO GAVE PT THE MAIN LINE NUMBER TO CALL UPON ARRIVAL.

## 2021-01-22 ENCOUNTER — DOCUMENTATION ONLY (OUTPATIENT)
Dept: SLEEP MEDICINE | Facility: CLINIC | Age: 61
End: 2021-01-22

## 2021-01-25 ENCOUNTER — DOCUMENTATION ONLY (OUTPATIENT)
Dept: SLEEP MEDICINE | Facility: CLINIC | Age: 61
End: 2021-01-25

## 2021-01-25 NOTE — PROGRESS NOTES
3 DAY STM VISIT    Diagnostic AHI:    40.7 events per hour watch PAT    Patient contacted for 3 day STM visit    Confirmed with patient at time of call- Yes Patient is still interested in STM service     Subjective measures:  Patient struggling with mask fit and pressures.  Discussed data that his mask was leaking for the entire usage time.   Discussed using the mask fit option on his device to find a good seal.  If he is not able to get a good seal he will reach out to [facility]  Regarding doing a mask exchange.     Replacement device: No  STM ordered by provider: Yes     Device type: Auto-CPAP  PAP settings from order::  CPAP min 5 cm  H20       CPAP max 15 cm  H20         Mask type:    Nasal Mask     Device settings from machine      Min CPAP 5.0            Max CPAP 15.0                EPR level Setting: TWO      RESMED Soft response setting:  OFF  Assessment: one night of usage where he struggled with mask fit.   Action plan: Patient to have 14 day STM visit. Patient has a follow up visit scheduled:   yes within 31-90 days of set up.     Total time spent on accessing and  interpreting remote patient PAP therapy data  10 minutes  Total time spent counseling, coaching  and reviewing PAP therapy data with patient  7 minutes  [id number] no

## 2021-02-01 ENCOUNTER — OFFICE VISIT - HEALTHEAST (OUTPATIENT)
Dept: SURGERY | Facility: CLINIC | Age: 61
End: 2021-02-01

## 2021-02-01 DIAGNOSIS — E66.01 OBESITY, MORBID, BMI 50 OR HIGHER (H): ICD-10-CM

## 2021-02-01 DIAGNOSIS — E78.2 MIXED HYPERLIPIDEMIA: ICD-10-CM

## 2021-02-01 DIAGNOSIS — I10 HYPERTENSION, UNSPECIFIED TYPE: ICD-10-CM

## 2021-02-01 DIAGNOSIS — Z71.3 NUTRITIONAL COUNSELING: ICD-10-CM

## 2021-02-08 ENCOUNTER — DOCUMENTATION ONLY (OUTPATIENT)
Dept: SLEEP MEDICINE | Facility: CLINIC | Age: 61
End: 2021-02-08
Payer: COMMERCIAL

## 2021-02-08 NOTE — PROGRESS NOTES
14  DAY STM VISIT    Diagnostic AHI:  40.7 events per hour watch PAT    Subjective measures:   Patient having trouble finding the right mask and CPAP is still a work in progress for him.     Assessment: Pt not meeting objective benchmarks for compliance  Patient failing following subjective benchmarks: mask discomfort     Action plan: pt to have 30 day STM visit.      Device type: Auto-CPAP    PAP settings: CPAP min 5.0 cm  H20       CPAP max 15.0 cm  H20      95th% pressure 10.4 cm  H20        RESMED EPR level Setting: TWO    RESMED Soft response setting:  OFF    Mask type:  Nasal Mask    Objective measures: 14 day rolling measures      Compliance  7 %      Leak  18.6  lpm  last  upload      AHI 8.15   last  upload      Average number of minutes 22      Objective measure goal  Compliance   Goal >70%  Leak   Goal < 24 lpm  AHI  Goal < 5  Usage  Goal >240        Total time spent on accessing and interpreting remote patient PAP therapy data  10 minutes    Total time spent counseling, coaching  and reviewing PAP therapy data with patient  3 minutes    [id number]  [id number]  no (3 day STM)

## 2021-02-17 ENCOUNTER — COMMUNICATION - HEALTHEAST (OUTPATIENT)
Dept: SURGERY | Facility: CLINIC | Age: 61
End: 2021-02-17

## 2021-02-18 ENCOUNTER — OFFICE VISIT - HEALTHEAST (OUTPATIENT)
Dept: SURGERY | Facility: CLINIC | Age: 61
End: 2021-02-18

## 2021-02-18 DIAGNOSIS — E66.01 OBESITY, MORBID, BMI 50 OR HIGHER (H): ICD-10-CM

## 2021-02-18 ASSESSMENT — MIFFLIN-ST. JEOR: SCORE: 2327.86

## 2021-02-22 ENCOUNTER — AMBULATORY - HEALTHEAST (OUTPATIENT)
Dept: SURGERY | Facility: CLINIC | Age: 61
End: 2021-02-22

## 2021-02-23 ENCOUNTER — DOCUMENTATION ONLY (OUTPATIENT)
Dept: SLEEP MEDICINE | Facility: CLINIC | Age: 61
End: 2021-02-23

## 2021-02-23 NOTE — PROGRESS NOTES
30 DAY [facility] VISIT    Diagnostic AHI:    40.7 events per hour watch PAT    Message left for patient to return call     Assessment: Pt not meeting objective benchmarks for compliance     Action plan:   Patient has not scheduled a follow up visit    Device type: Auto-CPAP  PAP settings: CPAP min 5.0 cm  H20     CPAP max 15.0 cm  H20         95th% pressure 9.2 cm  H20      RESMED EPR level Setting: TWO    RESMED Soft response setting:  OFF  Mask type:  Nasal Mask  Objective measures: 14 day rolling measures      Compliance  0 %      Leak  18.6 lpm  last  upload      AHI 5.4   last  upload      Average number of minutes 39      Objective measure goal  Compliance   Goal >70%  Leak   Goal < 24 lpm  AHI  Goal < 5  Usage  Goal >240        Total time spent on accessing and interpreting remote patient PAP therapy data  10 minutes    Total time spent counseling, coaching  and reviewing PAP therapy data with patient  0 minutes     [id number] this call  [id number] no  at 3 or 14 day [facility]

## 2021-03-01 ENCOUNTER — OFFICE VISIT - HEALTHEAST (OUTPATIENT)
Dept: SURGERY | Facility: CLINIC | Age: 61
End: 2021-03-01

## 2021-03-01 DIAGNOSIS — I10 HYPERTENSION, UNSPECIFIED TYPE: ICD-10-CM

## 2021-03-01 DIAGNOSIS — E78.2 MIXED HYPERLIPIDEMIA: ICD-10-CM

## 2021-03-01 DIAGNOSIS — E66.01 OBESITY, MORBID, BMI 50 OR HIGHER (H): ICD-10-CM

## 2021-03-01 DIAGNOSIS — Z71.3 NUTRITIONAL COUNSELING: ICD-10-CM

## 2021-03-04 ENCOUNTER — OFFICE VISIT - HEALTHEAST (OUTPATIENT)
Dept: INTERNAL MEDICINE | Facility: CLINIC | Age: 61
End: 2021-03-04

## 2021-03-04 DIAGNOSIS — N20.0 NEPHROLITHIASIS: ICD-10-CM

## 2021-03-04 DIAGNOSIS — I10 ESSENTIAL HYPERTENSION: ICD-10-CM

## 2021-03-04 DIAGNOSIS — Z01.818 PREOP GENERAL PHYSICAL EXAM: ICD-10-CM

## 2021-03-04 DIAGNOSIS — G47.33 OSA (OBSTRUCTIVE SLEEP APNEA): ICD-10-CM

## 2021-03-04 DIAGNOSIS — F32.1 MODERATE MAJOR DEPRESSION (H): ICD-10-CM

## 2021-03-04 LAB
ANION GAP SERPL CALCULATED.3IONS-SCNC: 10 MMOL/L (ref 5–18)
BUN SERPL-MCNC: 16 MG/DL (ref 8–22)
CALCIUM SERPL-MCNC: 9.2 MG/DL (ref 8.5–10.5)
CHLORIDE BLD-SCNC: 107 MMOL/L (ref 98–107)
CO2 SERPL-SCNC: 24 MMOL/L (ref 22–31)
CREAT SERPL-MCNC: 1.08 MG/DL (ref 0.7–1.3)
ERYTHROCYTE [DISTWIDTH] IN BLOOD BY AUTOMATED COUNT: 13.3 % (ref 11–14.5)
GFR SERPL CREATININE-BSD FRML MDRD: >60 ML/MIN/1.73M2
GLUCOSE BLD-MCNC: 106 MG/DL (ref 70–125)
HCT VFR BLD AUTO: 44.8 % (ref 40–54)
HGB BLD-MCNC: 14.9 G/DL (ref 14–18)
MCH RBC QN AUTO: 28.7 PG (ref 27–34)
MCHC RBC AUTO-ENTMCNC: 33.3 G/DL (ref 32–36)
MCV RBC AUTO: 86 FL (ref 80–100)
PLATELET # BLD AUTO: 190 THOU/UL (ref 140–440)
PMV BLD AUTO: 10.1 FL (ref 7–10)
POTASSIUM BLD-SCNC: 4.1 MMOL/L (ref 3.5–5)
RBC # BLD AUTO: 5.19 MILL/UL (ref 4.4–6.2)
SODIUM SERPL-SCNC: 141 MMOL/L (ref 136–145)
WBC: 5.8 THOU/UL (ref 4–11)

## 2021-03-04 ASSESSMENT — MIFFLIN-ST. JEOR: SCORE: 2323.33

## 2021-03-16 ENCOUNTER — COMMUNICATION - HEALTHEAST (OUTPATIENT)
Dept: SURGERY | Facility: CLINIC | Age: 61
End: 2021-03-16

## 2021-03-16 ENCOUNTER — AMBULATORY - HEALTHEAST (OUTPATIENT)
Dept: SURGERY | Facility: CLINIC | Age: 61
End: 2021-03-16

## 2021-03-17 ENCOUNTER — AMBULATORY - HEALTHEAST (OUTPATIENT)
Dept: SURGERY | Facility: HOSPITAL | Age: 61
End: 2021-03-17

## 2021-03-17 DIAGNOSIS — Z11.59 ENCOUNTER FOR SCREENING FOR OTHER VIRAL DISEASES: ICD-10-CM

## 2021-03-19 ENCOUNTER — RECORDS - HEALTHEAST (OUTPATIENT)
Dept: ADMINISTRATIVE | Facility: OTHER | Age: 61
End: 2021-03-19

## 2021-03-29 ENCOUNTER — COMMUNICATION - HEALTHEAST (OUTPATIENT)
Dept: INTERNAL MEDICINE | Facility: CLINIC | Age: 61
End: 2021-03-29

## 2021-03-29 DIAGNOSIS — E78.5 HYPERLIPIDEMIA, UNSPECIFIED HYPERLIPIDEMIA TYPE: ICD-10-CM

## 2021-04-01 ENCOUNTER — RECORDS - HEALTHEAST (OUTPATIENT)
Dept: ADMINISTRATIVE | Facility: OTHER | Age: 61
End: 2021-04-01

## 2021-04-12 ENCOUNTER — OFFICE VISIT - HEALTHEAST (OUTPATIENT)
Dept: SURGERY | Facility: CLINIC | Age: 61
End: 2021-04-12

## 2021-04-12 DIAGNOSIS — I10 HYPERTENSION, UNSPECIFIED TYPE: ICD-10-CM

## 2021-04-12 DIAGNOSIS — E78.2 MIXED HYPERLIPIDEMIA: ICD-10-CM

## 2021-04-12 DIAGNOSIS — Z71.3 NUTRITIONAL COUNSELING: ICD-10-CM

## 2021-04-12 DIAGNOSIS — E66.01 OBESITY, MORBID, BMI 50 OR HIGHER (H): ICD-10-CM

## 2021-04-27 ENCOUNTER — COMMUNICATION - HEALTHEAST (OUTPATIENT)
Dept: INTERNAL MEDICINE | Facility: CLINIC | Age: 61
End: 2021-04-27

## 2021-05-12 ENCOUNTER — RECORDS - HEALTHEAST (OUTPATIENT)
Dept: ADMINISTRATIVE | Facility: OTHER | Age: 61
End: 2021-05-12

## 2021-05-17 ENCOUNTER — OFFICE VISIT - HEALTHEAST (OUTPATIENT)
Dept: SURGERY | Facility: CLINIC | Age: 61
End: 2021-05-17

## 2021-05-17 DIAGNOSIS — E66.01 OBESITY, MORBID, BMI 50 OR HIGHER (H): ICD-10-CM

## 2021-05-17 DIAGNOSIS — Z71.3 NUTRITIONAL COUNSELING: ICD-10-CM

## 2021-05-17 DIAGNOSIS — I10 HYPERTENSION, UNSPECIFIED TYPE: ICD-10-CM

## 2021-05-17 DIAGNOSIS — E78.2 MIXED HYPERLIPIDEMIA: ICD-10-CM

## 2021-05-24 ENCOUNTER — COMMUNICATION - HEALTHEAST (OUTPATIENT)
Dept: SURGERY | Facility: CLINIC | Age: 61
End: 2021-05-24
Payer: COMMERCIAL

## 2021-05-25 ENCOUNTER — RECORDS - HEALTHEAST (OUTPATIENT)
Dept: ADMINISTRATIVE | Facility: CLINIC | Age: 61
End: 2021-05-25

## 2021-05-26 ENCOUNTER — AMBULATORY - HEALTHEAST (OUTPATIENT)
Dept: SURGERY | Facility: CLINIC | Age: 61
End: 2021-05-26

## 2021-05-26 ENCOUNTER — COMMUNICATION - HEALTHEAST (OUTPATIENT)
Dept: SURGERY | Facility: CLINIC | Age: 61
End: 2021-05-26
Payer: COMMERCIAL

## 2021-05-26 ENCOUNTER — RECORDS - HEALTHEAST (OUTPATIENT)
Dept: ADMINISTRATIVE | Facility: CLINIC | Age: 61
End: 2021-05-26

## 2021-05-26 DIAGNOSIS — Z01.818 PRE-OP TESTING: ICD-10-CM

## 2021-05-26 DIAGNOSIS — Z71.89 ENCOUNTER FOR PRE-BARIATRIC SURGERY COUNSELING AND EDUCATION: ICD-10-CM

## 2021-05-26 DIAGNOSIS — K21.9 ACID REFLUX: ICD-10-CM

## 2021-05-26 DIAGNOSIS — Z98.84 S/P BARIATRIC SURGERY: ICD-10-CM

## 2021-05-26 DIAGNOSIS — R63.4 RAPID WEIGHT LOSS: ICD-10-CM

## 2021-05-26 DIAGNOSIS — E66.01 MORBID OBESITY (H): ICD-10-CM

## 2021-05-26 ASSESSMENT — PATIENT HEALTH QUESTIONNAIRE - PHQ9: SUM OF ALL RESPONSES TO PHQ QUESTIONS 1-9: 10

## 2021-05-27 ENCOUNTER — COMMUNICATION - HEALTHEAST (OUTPATIENT)
Dept: SURGERY | Facility: CLINIC | Age: 61
End: 2021-05-27

## 2021-05-27 ENCOUNTER — COMMUNICATION - HEALTHEAST (OUTPATIENT)
Dept: SURGERY | Facility: CLINIC | Age: 61
End: 2021-05-27
Payer: COMMERCIAL

## 2021-05-27 DIAGNOSIS — I10 BENIGN ESSENTIAL HYPERTENSION: ICD-10-CM

## 2021-05-28 ASSESSMENT — ANXIETY QUESTIONNAIRES: GAD7 TOTAL SCORE: 3

## 2021-05-29 ENCOUNTER — RECORDS - HEALTHEAST (OUTPATIENT)
Dept: ADMINISTRATIVE | Facility: CLINIC | Age: 61
End: 2021-05-29

## 2021-05-29 NOTE — PROGRESS NOTES
Office Visit - Follow Up   Nick Coley   59 y.o. male    Date of Visit: 6/10/2019    Chief Complaint   Patient presents with     Headache        Assessment and Plan   1. Essential hypertension  Uncontrolled hypertension off of his usual medication.  He simply got tired of taking his medications on a daily basis.  Encouraging him to restart lisinopril/HCTZ 10/12.5 mg daily with prescription sent to his pharmacy.  We will follow-up in 6 weeks.  - lisinopril-hydrochlorothiazide (PRINZIDE,ZESTORETIC) 10-12.5 mg per tablet; Take 1 tablet by mouth daily.  Dispense: 90 tablet; Refill: 3  - Basic Metabolic Panel  - Hemoglobin    2. Morbid obesity (H)  He was evaluated at bariatric clinic last year, prescribed naltrexone.  Took for a short time and discontinued.  I am encouraged him to follow-up with bariatric clinic.    3. Moderate major depression (H)  Worsening depression.  Significant life stressors with family and financial but also discontinued his citalopram.  Will get this restarted 10 mg daily for 1 week and then 20 mg daily.  I also think he would benefit from seeing a psychologist and I am making this referral.  No suicidal ideations.  PHQ 9 score is 9  - citalopram (CELEXA) 20 MG tablet; Take 1 tablet (20 mg total) by mouth daily.  Dispense: 90 tablet; Refill: 3  - Ambulatory referral to Psychology    4. Chronic tension-type headache, not intractable  Increasing headaches likely combination of tension type headaches related to stress but also could be from uncontrolled hypertension    5. Suspected sleep apnea  He should have sleep study completed and will discuss further at his return    6. Vitamin D deficiency  Vitamin D level only 10.  We will get him restarted on 50,000 units of vitamin D daily        Return in about 6 weeks (around 7/22/2019) for Annual physical.     History of Present Illness   This 59 y.o. old male with hypertension, morbid obesity, and depression here to follow-up.  Not taking any of  his medications including lisinopril/HCTZ and citalopram.  Describing increasing frontal headaches.  Describes these as mild and not disabling but persistent.  He is under tremendous stress.  Many family problems and also stress related to finances.  Worsening depression.  Previously on citalopram and discontinued as above.  He has not seen a psychologist.  Denies any suicidal ideations.  He did meet with bariatric clinic last year and naltrexone was prescribed and he took for a short period of time but discontinued.  Unfortunately his weight is up 12 pounds.  His BMI is 54.4.  He has no motivation to make any significant lifestyle changes.    Review of Systems: No chest pain.  No dyspnea.       Medications, Allergies and Problem List   Patient Active Problem List   Diagnosis     Essential hypertension     Abnormal LFTs (liver function tests)     Morbid obesity (H)     LBP (low back pain)     Hyperlipidemia     Nephrolithiasis     Irritable bowel syndrome     Microscopic hematuria     Suspected sleep apnea     Vitamin D deficiency     Moderate major depression (H)     Chronic tension-type headache, not intractable       He has a past surgical history that includes Carpal tunnel release (Right, 2001); Extracorporeal shock wave lithotripsy (2013); pr knee scope,single menisectomy (Right, 12/22/2015); and [place] tooth extraction (2013).    Allergies   Allergen Reactions     Adhesive Tape-Silicones Rash     Silk tape not paper tape     Other Allergy (See Comments) Rash     Silk tape not paper tape       Current Outpatient Medications   Medication Sig Dispense Refill     aspirin 325 MG EC tablet TAKE ONE TABLET BY MOUTH DAILY 90 tablet 1     citalopram (CELEXA) 20 MG tablet Take 1 tablet (20 mg total) by mouth daily. 90 tablet 3     ergocalciferol (VITAMIN D2) 50,000 unit capsule Take 1 capsule (50,000 Units total) by mouth once a week. 12 capsule 3     lisinopril-hydrochlorothiazide (PRINZIDE,ZESTORETIC) 10-12.5 mg per  "tablet Take 1 tablet by mouth daily. 90 tablet 3     No current facility-administered medications for this visit.         Physical Exam   General Appearance:   Obese and depressed appearing middle-aged male    BP (!) 174/92 (Patient Site: Left Arm, Patient Position: Sitting, Cuff Size: Adult Large)   Pulse 72   Ht 5' 7.5\" (1.715 m)   Wt (!) 353 lb (160.1 kg)   SpO2 94%   BMI 54.47 kg/m          PHQ 9 score is 9     Additional Information   Social History     Tobacco Use     Smoking status: Never Smoker     Smokeless tobacco: Never Used     Tobacco comment: cigar once or twice a year   Substance Use Topics     Alcohol use: Yes     Comment: just during softball season or vacation     Drug use: No              Long Weston MD  "

## 2021-05-29 NOTE — TELEPHONE ENCOUNTER
New Appointment Needed  What is the reason for the visit:    patient has had a headache x 2 weeks   Provider Preference: PCP only  How soon do you need to be seen?: as soon as possible   Waitlist offered?: No  Okay to leave a detailed message:  Yes, please call Nick back to schedule this appointment.    Patients wife said that PCP usually calls the patient and ask him to come in for an appointment and patient will agree with the doctor. Patient will not come in if his wife makes him the appointment.

## 2021-05-29 NOTE — TELEPHONE ENCOUNTER
Spoke with the patient and he has agreed to come to clinic on Monday, 6/10/19 at 9:20 am.  Patient had no further questions at this time.  Mamie JACKSON CMA/CAROLIN....................4:06 PM

## 2021-05-30 VITALS — BODY MASS INDEX: 50.94 KG/M2 | WEIGHT: 315 LBS

## 2021-05-30 NOTE — PATIENT INSTRUCTIONS - HE
Have a conversation with your wife about   [facility]ace  Responsibility     Your job is to figure a system that will work for you regarding your medications.

## 2021-05-30 NOTE — PROGRESS NOTES
Mental Health Visit Note    6/17/2019    Start time: 1301    Stop Time: 1358   Session # 1    Session Type: Patient is presenting for an Individual session.   Present: patient and therapist    Nick Coley is a 59 y.o. male is being seen today for    Chief Complaint   Patient presents with     Intake #1     depression     New symptoms or complaints: The patient was referred to the mental health clinic by his primary care provider for assessment and treatment of his depression and anxiety.     Functional Impairment:   Personal: 3  Family: 3  Work: 2  Social:3    Clinical assessment of mental status: The patient was on time for their scheduled session.  They were appropriately dressed with good grooming and hygiene.  The patient was oriented X4.  Patient was pleasant and cooperative.  Mood and affect were mildly anxious and depressed.   The patient made appropriate eye contact.  Recent and remote memories were intact.  Thought process was logical and linear.  Speech/language (tone and rate) were normal.  Insight and judgment were adequate.    Suicidal/Homicidal Ideation present: None Reported This Session    Patient's impression of their current status: Patient comes to session indicating that some life stressors are happening and he wants to be able to process them and adjust to them in healthy ways.  He reports having a history of proactively seeking mental health support at times like this.     Therapist impression of patients current state: Introduced self to patient using AIDET.  Reviewed and signed informed consent.  Explored with patient his mental health history, current stressors and expectations for therapy.  Focus of session was placed on establishing rapport and problem statement.  Patient agreeable to return and in filling out intake paperwork.      Type of psychotherapeutic technique provided: Client centered and MI    Progress toward short term goals:Progress as expected, as evidenced by the patient's  engagement and willingness.     Review of long term goals: Not done at today's visit- 1st session, treatment planning will take place following completion of DA.     Diagnosis:   1. Depression, unspecified depression type        Plan and Follow up: 1.  Patient to schedule for continued follow up psychotherapy appointments.    2.  Patient will use their crisis plan and/or access crisis services should symptoms       become worse.      3.  Patient to remain medication compliant.  4.  Patient to abstain from drugs and alcohol.  5.  Complete intake paperwork    Discharge Criteria/Planning: Patient will continue with follow-up until therapy can be discontinued without return of signs and symptoms.    Julian Gomez

## 2021-05-30 NOTE — PROGRESS NOTES
"Initial Psychotherapy Diagnostic Assessment     [x] Standard  [] Updated    Date(s): 7/15/2019 Start Time: 1300 Stop Time: 1355    Patient Name:  Nick Coley Age: 59 y.o. :  1960 MRN:  [id number]    Session Type: Patient is presenting for an Individual session.       Reason for Referral:  Mr. Coley is a 59 y.o. year-old male who was referred by Dr. Weston for an evaluation of cognitive, behavioral, and emotional functioning.  The patient was made aware of the role of psychology service in the patient's care, risks and benefits, and the limits of confidentiality.  The patient agreed to proceed.         Persons Present: Patient and therapist    Presenting Problem/History:  The patient (summarize the reason the patient believes they were referred and what they believe their problem is.)    Mr. Coley believes he was referred by his primary MD to address the stressors currently present in his life that may be affecting him negatively with headaches, tiredness, weight gain and depression.        Patient s expectation for treatment (patient stated initial goal; i.e.: I want to let go of my worries , Medication treatment if indicated):  Mr. Coley does not have specific expectations for treatment, rather just \"to feel better.\"          Functional Impairments: (subjective- 0 is no issues and 4 is extreme issues)  Personal: 3  Family: 3  Work: 2  Social:2     How does the presenting problem affect patients daily functioning:     Mr. Coley acknowledges that his stressors and mental health appear to effecting his health and mental health more so than him occupationally and/or socially.      Issues/Stressors:   Family stressors, medical stressors, financial stressors     Physical Problems: Sweating, Chest pain, Rapid heart pounding, Disturbing body sensations, Swallowing problems, Blurred vision, Numbness, Shortness of breath, Inability to sleep, Sleeping too much, Decreased energy, Increased energy, Decreased " appitite and Increased appitite    Social Problems: Job problems, Problems at school, Communications problems, Distrust of others, Uncomfortable when alone, Need for excessive advice , Need for excessive praise, Increased social activity, Decreased social activity, Loss of interest in activities and Sexual difficulties      Behavioral Problems: Excessive work, Reckless and Temper outbursts      Cognitive Problems: Distractability, Poor attention, Indecisiveness, Poor memory, Forgetful, Disordered thinking, Racing thoughts, Bothersome thoughts, Procrastination and Worries      Emotional Problems: Anxious, Angry, Nervous, Apathetic, Irritable, Emptiness, Boredom, Excessive fears, Restricted emotion, Depressed mood, Elevated mood, Mood swings, Feelings of shame, Feelings of guilt, Lack of self confidence and Inferiority feelings     Onset/Frequency/Duration presenting problem symptoms:    Reports a mild history of depression or mood issues.  He does report that the last 2-3 years this has increased with the responsibility of raising his granddaughter and with the aging of his youngest two children.    How does the patient perceive his problem in relation to how others see his/her problem?    Mr. Lau does believe that his mental health is related to his stressors and not knowing how to cope/solve them.  This does seem to be shared by others who know him as well.     Family/Social History:     Marriages/Significant other:    he is currently  and lives with his spouse, two youngest children and his young granddaughter. (including patients evaluation of the relationship quality):   for over 30 years.  He describes his marriage as satisfactory although stressors do exist.     Children:  (sex and ages, any significant issues):  3 biological children and 1 step-child.  Ages range from late 20s to early teens.  Granddaughter does live with them, she is age 4. Oldest son does have ADHD/bipolar issues that can  cause some family strains.  He does describe his relationship with children as good and yet again, stressors do exist.     Parents:  (ages, living or , how many years ):   - no issues     Siblings:  (birth order, ages, significant issues):  None reported    Education:   High school degree    Developmental factors:  (developmental milestones, head injuries, CVA s, etc. that may have impeded milestones):  None reported    Significant personal relationships including patient s evaluation of the relationship quality:  (Co-worker s, neighbor s, AA groups, Yarsanism peers, etc.):   Reports no close friends, his family is his main strong social relationship.     Sexual/physical/emotional/financial abuse/traumatic event:  (any child protection involvement; who reported, Impact on patient/family/other):   None reported      Contextual Non-personal factors contributing to the patients concerns:  (divorce in family, nation/natural disasters):  Adopted    Strengths/personal resources:  (what does the patient do well, what is going well in life, positive personality characteristics):  Responsible, commitment, dedication, caring, kind     Support network(s)/Resources:  (including strength and quality of social networks, who does the client consider supportive, other agencies or services patient uses):   No formal support networks    Belief system:    Quaker- non practicing     Cultural influences and impact on patient:  (ask about all aspects of culture and ask which are relevant to the patient. Go beyond nationality and ethnicity. Consider biases, life style, community style, i.e.: urban, poverty, abuse, etc). see page 5 Diagnostic Assessment, Clinical Training for descriptors):  Mr. Coley was adopted and isn't sure about much of his birth family.  He does report growing up/raised [facility].  He does have a strong familial connection and attachment.     Cultural impact on health and health care:  (how  does patient s culture influence how the patient receives health care): The patient does not report cultural factors impacting pursuit of care.  The patient pursues standard medical care.  None reported.  He is agreeable to western approach to medicine.     Current living situation:  (Household members, housing status, stability, multiple moves, potential eviction):  Lives in his home with wife, younger two children, granddaughter.  He describes it as safe, stable and secure.     Work History:   Security, works 2 jobs.      Financial Concerns:  (basic status, housing, food, clothing are they on any assistance including SSI/SSDI):   He does have financial stress.  He describes helping his children out frequently.  He will describe his spouse apologizing for spending money frequently as well.     Legal Problems:  (DUI S, divorce, law suits, etc.):  (optional)  None reported     Patient Medical History  Mr. Coley's medical history is significant for   Past Medical History:   Diagnosis Date     Abnormal LFTs (liver function tests) 12/18/2015    Negative hepatitis C antibody and ferritin of 330 6 December 2015, steatohepatitis suspected     Cellulitis, face 2014     Chronic tension-type headache, not intractable 6/10/2019     Depression with anxiety      Essential hypertension 12/18/2015     Hematoma of leg, left, subsequent encounter 7/23/2018     Hyperlipidemia      Irritable bowel syndrome      Kidney stones     x 4     LBP (low back pain)      Microscopic hematuria 7/23/2018     Moderate major depression (H) 6/10/2019     Morbid obesity (H) 12/18/2015     Nephrolithiasis     2013, 2009, 2001     Suspected sleep apnea 11/16/2018     Tear of meniscus of right knee 12/18/2015    Associated with right tibial plateau fracture 2013, arthroscopic knee surgery December 2015     Vitamin D deficiency     Vitamin D 10.0 November 2018     Wrist fracture        Current Medications:  Current Outpatient Medications   Medication  Sig     aspirin 325 MG EC tablet TAKE ONE TABLET BY MOUTH DAILY     citalopram (CELEXA) 20 MG tablet Take 1 tablet (20 mg total) by mouth daily.     ergocalciferol (VITAMIN D2) 50,000 unit capsule Take 1 capsule (50,000 Units total) by mouth once a week.     lisinopril-hydrochlorothiazide (PRINZIDE,ZESTORETIC) 10-12.5 mg per tablet Take 1 tablet by mouth daily.       Past Mental Health History:    Previous mental health diagnosis:  Depression    Hx of Mental Health Treatment or Services:  None reported    Hx of MH Tx/Hospitalizations:  (When/Where: must include a review of patient s record.  If not available, why, what if anything are you doing to obtain a record?):   None reported    Hx of Psychiatric Medications:  See medication history       Self Report Measures:    On the Patient Health Questionnaire-9, a self report measure of depressive symptomatology, he obtained a score of 9, placing him in the range of moderate depression.      On the Generalized Anxiety Disorder-7, a self-report measure of anxiety, he obtained a score of 5,  placing him in the range of mild anxiety.      Suicidal/Homicidal Risk Assessment:    Suicidal: None reported  History of Past Attempt(s): none reported  Crisis Plan: Patient is not at risk of suicide based on denying ideation, no history and strong protective factors.     Homicidal: None reported   History of Aggression towards others: none reported    Slope Suicide Severity Risk Screen:  0- low risk   History of destruction to property:  Description: none reported    Family Mental Health/Medical History    Family Mental Health:    None reported    Family history of Suicide:  None reported    Family history Chemical Dependency:    None reported    Family Medical history: Family medical history is significant for:   Family History   Adopted: Yes   Problem Relation Age of Onset     Diabetes Mother      Anesthesia problems Neg Hx          Chemical Use/Abuse History    CAGE-AID  (screening to determine a patients use/abuse/dependency):      0/4    1.  Have you ever felt you ought to cut down on your drinking or drug use?  2.  Have people annoyed you by criticizing your drinking or drug use?  3.  Have you felt bad or guilty about your drinking or drug use?  4.  Have you ever had a drink or use drugs first thing in the morning to steady her nerves are to get rid of a hangover (eye-opener)?      Alcohol:   [] None Reported    [x] Yes   [] No  Type: beer   Frequency (daily, weekly, occasionally): occasional      Street Drugs:   [x] None Reported    [] Yes   [] No    Prescription Drugs:   [x] None Reported    [] Yes   [] No  Tobacco:   [x] None Reported    [] Yes   [] No  Caffeine:   [] None Reported    [x] Yes   [] No  Type: coffee, soda    Frequency (daily, weekly, occasionally): daily   Currently in a treatment program:   [] Yes   [x] No    History of CD Treatment:      [x] None Reported     MENTAL STATUS EVALUATION  Grooming: Well groomed  Attire: Appropriate  Age: Appears Stated  Behavior Towards Examiner: Cooperative  Motor Activity: Within normal   Eye Contact: Appropriate  Mood: Depressed  Affect: Depressed  Speech/Language: Within normal  Attention: Within normal  Concentration: Within normal  Thought Process: Within normal  Thought Content: Hallucinations: Within noraml  Delusions: Within normal  Orientation: X 3  Memory: No Evidence of Impairment  Judgement: No Evidence of Impairment  Estimated Intelligence: Average  Demonstrated Insight: Adequate  Fund of Knowledge: adequate    Clinical Impressions/Assessment/Recommendations: (Stands alone; is a synopsis of patients story, any impacting family or cultural issue on diagnosis and how patient meets criteria for diagnosis). Can state does not meet full criteria and therefore a provisional diagnosis is given.    The patient is a 59 y.o. year-old,  male referred to the mental health clinic by his primary care provider for assessment  and treatment recommendations of his depression.  The patient does not have a significant history of mental illness.  He endorses several stressors that do not appear to be resolving in his life.  He endorses depressive symptoms such as lacking interest in things he used to find interesting, lower energy/ambition, isolation, headaches, trouble sleeping.  He does not notice impairment in social/occupational functioning.  He has multiple medical issues at play.  He struggles with compliance with medication treatment of issues.       He would likely benefit from  motivational interviewing, active listening, reassurance and support in the context of cognitive behavioral therapy to address the above.    Prioritization of needed mental Health ancillary or other services.   1. Psychotherapy- stress management  2. Medication compliance    Explanation for any provisional diagnosis. Hypothesis why alternative diagnosis was considered and ruled out.  MDD- patient depressed mood doesn't appear to meet the threshold/severity of a major depression diagnosis.      Recommendations (treatment, referrals, services needed).  Patient would benefit from continued psychotherapy services every 2 weeks to further address presenting symptoms and concerns.     Diagnosis (non-Axial as defined in DSM-5)  Depression- unspecified     Provisional Diagnosis (list only- no explanation needed)  MDD      WHODAS 2.0 12-item version 24  Scores presented in qualifiers to represent level of disability.  NO problem - (none, absent, negligible,  ) - 0-4 %   MILD problem - (slight, low, ) - 5-24 %   MODERATE problem - (medium, fair,...) - 25-49 %   SEVERE problem - (high, extreme,  ) - 50-95 %   COMPLETE problem - (total, ) -  %    Assessment of client resolving presenting mental health concerns:  Ability  [] low     [x] average     [] high  Motivation [] low     [x] average     [] high  Willingness [] low     [x] average     []  high    Sources/references used in completing this assessment:   Will be a drop down menu-   -Face to face interview  -Patient Chart  -Adult Intake Questionnaire  -Measures completed: WHODAS, C-SSRS, CAGE, PHQ-9, SARAH-7  -Other____________  Initial Therapy Plan (ex: develop therapeutic relationship with therapist, Refer to psychiatry/psych testing, etc.):    1. Patient and therapist will develop therapeutic relationship.  2. Patient to present for follow up appointment to initiate psychotherapy services.  3. Patient to continue to follow up with primary care physician as needed and take medications as prescribed.  4. Develop comprehensive treatment plan.     Is patient's family involved in the treatment?  [] No     [x] Yes    If yes, How?  Interpersonal stress- may be involved in individual sessions      Therapist s Signature/Supervision/co-signature statement:   JOSAFAT Snyder, LICSW
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
## 2021-05-31 NOTE — PROGRESS NOTES
Mental Health Visit Note    8/5/2019    Start time: 1301    Stop Time: 1355   Session # 4    Session Type: Patient is presenting for an Individual session.    Nick Coley is a 59 y.o. male is being seen today for    Chief Complaint   Patient presents with      Follow Up     Depression     New symptoms or complaints: None    Functional Impairment:   Personal: 2  Family: 3  Work: 2  Social:2    Clinical assessment of mental status: The patient was on time for their scheduled session.  They were appropriately dressed with good grooming and hygiene.  The patient was oriented X4.  Patient was pleasant and cooperative.  Mood and affect were euthymic and congruent with the content of his speech.   The patient made appropriate eye contact.  Recent and remote memories were intact.  Thought process was logical and linear.  Speech/language (tone and rate) were normal.  Insight and judgment were adequate.    Suicidal/Homicidal Ideation present: None Reported This Session    Patient's impression of their current status:  Patient comes to this session having just returned from a vacation.  He reports that the vacation was refreshing and needed.  He reports little anxiety and improved mood since that time.      Therapist impression of patients current state: Patient's feelings/thoughts were validated and explored. Affirmed his follow through on taking care of himself by spending time with family.  MI used to help patient consider more ways he could accomplish this in an ongoing smaller scale way to help mitigate anxiety/depressin.  Patient was engaged, motivated and willing to explore.     Type of psychotherapeutic technique provided: Client centered and MI    Progress toward short term goals:Progress as expected, as evidenced in patient feeling better after vacation and remaining motivated for sustainable change.     Review of long term goals: Not done at today's visit    Diagnosis:   1. Depression, unspecified depression  type        Plan and Follow up: 1.  Patient to schedule for continued follow up psychotherapy appointments.    2.  Patient will use their crisis plan and/or access crisis services should symptoms       become worse.      3.  Patient to remain medication compliant.  4.  Patient to abstain from drugs and alcohol.  5.  Consider weekly outings/dates between himself and spouse.       Discharge Criteria/Planning: Patient will continue with follow-up until therapy can be discontinued without return of signs and symptoms.    Julian Gomez

## 2021-05-31 NOTE — PROGRESS NOTES
Mental Health Visit Note    7/23/2019    Start time: 1501    Stop Time: 1545   Session # 3    Session Type: Patient is presenting for an Individual session.   Present: patient/therapist     Nick Coley is a 59 y.o. male is being seen today for    Chief Complaint   Patient presents with     MH Follow Up     Depression     New symptoms or complaints: None    Functional Impairment:   Personal: 4  Family: 3  Work: 1  Social:1    Clinical assessment of mental status: The patient was on time for their scheduled session.  They were appropriately dressed with good grooming and hygiene.  The patient was oriented X4.  Patient was pleasant and cooperative.  Mood and affect were mildly depressed and congruent with the content of his speech.   The patient made appropriate eye contact.  Recent and remote memories were intact.  Thought process was logical and linear.  Speech/language (tone and rate) were normal.  Insight and judgment were adequate.    Suicidal/Homicidal Ideation present: None Reported This Session    Patient's impression of their current status: Patient comes to session with no changes to report.  He continues to have interpersonal stressors that are causing him to be anxious about his home and down about his life.  The patient continues to report inconsistency in regards to medications.     Therapist impression of patients current state: Patient's thoughts/feelings were explored and validated.  MI used to explore problem areas with patient, focusing on joinign with the patient and exploring ambivalence to change in his life.  Patient is aware of the dissonance that exists between his own inconsistency with medications and care for self and others in his family.  MI continues to be used to move towards change.     Type of psychotherapeutic technique provided: Client centered and MI, CBT    Progress toward short term goals:Progress as expected, as evidenced by engagment in MI process as opposed to active  resistance to exploration.     Review of long term goals: Not done at today's visit    Diagnosis:   1. Depression, unspecified depression type        Plan and Follow up: 1.  Patient to schedule for continued follow up psychotherapy appointments.    2.  Patient will use their crisis plan and/or access crisis services should symptoms       become worse.      3.  Patient to remain medication compliant.  4.  Patient to abstain from drugs and alcohol.      Discharge Criteria/Planning: Patient will continue with follow-up until therapy can be discontinued without return of signs and symptoms.    Julian Gomez

## 2021-06-01 VITALS — HEIGHT: 68 IN | BODY MASS INDEX: 47.74 KG/M2 | WEIGHT: 315 LBS

## 2021-06-01 VITALS — BODY MASS INDEX: 47.74 KG/M2 | WEIGHT: 315 LBS | HEIGHT: 68 IN

## 2021-06-01 NOTE — PROGRESS NOTES
9/9/2019    Start time: 801    Stop Time:850    Session # 6    Session Type: Patient is presenting for an Individual session.    Nick Coley is a 59 y.o. male is being seen today for    Chief Complaint   Patient presents with      Follow Up     Depression     Anxiety     Follow up in regards to ongoing symptom management of his depression in order to improve health outcomes and quality of life.     New symptoms or complaints: stomach pain    Functional Impairment:   Personal: 3  Family: 3  Social: 1  Work: 1    Clinical assessment of mental status:   Nick Coley presented on time.   He was oriented x3, open and cooperative, and dressed appropriately for this session and weather. His memory was Normal cognitive functioning .  His speech was  Within normal.  Language was normal.  Concentration and focus is Within normal. Psychosis is not noted or reported. He reports his mood is depressed.  Affect is congruent with speech and is Depressed.  Fund of knowledge is adequate. Insight is adequate for therapy.    Suicidal/Homicidal Ideation present: [place] administered- low risk      Patient's impression of their current status: Patient comes to session indicating progress on stressors from last session.  He reports having a stomach pain that he is afraid may be an ulcer.  He shares some questioning his parenting abilities and trouble managing stress in the home.     Therapist impression of patients current state: This 59 y.o. White or  male presents with ongoing depression.  Patient given time and space for non-judgemental exploration and processing of his parenting.  Used the session to help patient identify and discover contact with values living as evidenced by his and his wife's parenting.  We explored boundary setting, affirming his attempts at this in the recent history.  We continue to explore opportunities for self-care and relationship self-care.  Patient continues to have a difficult time with  remaining on track with medication compliance.       Assessment tools used today include: C-SRRS    Type of psychotherapeutic technique provided: Client centered and MI, CBT      Review of medication: Adherence as MD prescribed encouraged      Review of patient health/health concerns: encouraged follow up with primary MD for stomach pains/worries       Progress toward short term goals: Progressing as expected as evidenced by patient's reported handling and resolution of the acute stressors from previous session.     Review of long term goals: Not done at today's visit     Diagnosis:   1. Depression, unspecified depression type      no change    Plan and Follow-up: Patient plans to continue taking the medication as prescribed. Patient plans to follow up with therapy in two weeks.     Discharge Criteria/Planning: Patient will continue with follow-up until therapy can be discontinued without return of signs and symptoms.    Performed and documented by JOSAFAT Snyder, LICSW

## 2021-06-02 VITALS — BODY MASS INDEX: 47.74 KG/M2 | WEIGHT: 315 LBS | HEIGHT: 68 IN

## 2021-06-02 VITALS — WEIGHT: 315 LBS | HEIGHT: 68 IN | BODY MASS INDEX: 47.74 KG/M2

## 2021-06-02 NOTE — PROGRESS NOTES
10/7/2019    Start time: 1001    Stop Time: 1050    Session # 8    Session Type: Patient is presenting for an Individual session.  Present: patient and therapist     Nick Coley is a 59 y.o. male is being seen today for    Chief Complaint   Patient presents with      Follow Up     Depression     Follow up in regards to ongoing symptom management of his depression in order to improve health outcomes and quality of life.     New symptoms or complaints: stomach pain    Functional Impairment:   Personal: 3  Family: 3  Social: 1  Work: 1    Clinical assessment of mental status:   Nick Coley presented on time.   He was oriented x3, open and cooperative, and dressed appropriately for this session and weather. His memory was Normal cognitive functioning .  His speech was  Within normal.  Language was normal.  Concentration and focus is Within normal. Psychosis is not noted or reported. He reports his mood is depressed.  Affect is congruent with speech and is Depressed.  Fund of knowledge is adequate. Insight is adequate for therapy.  The patients mental status was reviewed and remains unchanged from his session on 9/23/19.    Suicidal/Homicidal Ideation present: [place] administered- low risk      Patient's impression of their current status: Patient comes to session reporting continued conflict in his home, secondary to spending habits and what he believes is him needing to be responsible for everyone else.       Therapist impression of patients current state: This 59 y.o. White or  male presents with ongoing depression.  Assertive communication is reviewed with patient and practiced.  It is hard to know the patient's level of motivation/willingness for change.  He has a difficult time, by his report, being consistent with his medications and it seems with boundary setting as well.  We explored couples counseling, budgeting resources/classes for patient to help.        Assessment tools used today include:  C-SRRS    Type of psychotherapeutic technique provided: Client centered and MI, CBT      Review of medication: Adherence as MD prescribed encouraged, patient reporting inconsistent       Review of patient health/health concerns: encouraged follow up with primary MD for stomach pains/worries - continued       Progress toward short term goals: Progressing as expected as evidenced by patient's reported handling and resolution of the acute stressors from previous session.     Review of long term goals: Not done at today's visit     Diagnosis:   1. Depression, unspecified depression type      no change    Plan and Follow-up: Patient plans to continue taking the medication as prescribed. Patient plans to follow up with therapy in two weeks.     Discharge Criteria/Planning: Patient will continue with follow-up until therapy can be discontinued without return of signs and symptoms.    Performed and documented by JOSAFAT Snyder, LICSW

## 2021-06-02 NOTE — PROGRESS NOTES
9/23/2019    Start time: 901    Stop Time:950    Session # 7    Session Type: Patient is presenting for an Individual session.  Present: patient and therapist     Nick Coley is a 59 y.o. male is being seen today for    Chief Complaint   Patient presents with      Follow Up     Depression     Follow up in regards to ongoing symptom management of his depression in order to improve health outcomes and quality of life.     New symptoms or complaints: stomach pain    Functional Impairment:   Personal: 3  Family: 3  Social: 1  Work: 1    Clinical assessment of mental status:   Nick Coley presented on time.   He was oriented x3, open and cooperative, and dressed appropriately for this session and weather. His memory was Normal cognitive functioning .  His speech was  Within normal.  Language was normal.  Concentration and focus is Within normal. Psychosis is not noted or reported. He reports his mood is depressed.  Affect is congruent with speech and is Depressed.  Fund of knowledge is adequate. Insight is adequate for therapy.  The patients mental status was reviewed and remains unchanged from his session on 9/9/19.    Suicidal/Homicidal Ideation present: [place] administered- low risk      Patient's impression of their current status: Patient comes to session reporting increased stress since previous session.  He expresses familial stressors as ongoing/chronic and a desire to increase familial connections.     Therapist impression of patients current state: This 59 y.o. White or  male presents with ongoing depression.  We explored/processed his feelings and thoughts, validating.  Identified the values underlying the patient's emotions and explored opportunities to make changes and enhance his values.  Patient was engaged, willing and motivated.       Assessment tools used today include: C-SRRS    Type of psychotherapeutic technique provided: Client centered and MI, CBT      Review of medication:  Adherence as MD prescribed encouraged      Review of patient health/health concerns: encouraged follow up with primary MD for stomach pains/worries - continued       Progress toward short term goals: Progressing as expected as evidenced by patient's reported handling and resolution of the acute stressors from previous session.     Review of long term goals: Not done at today's visit     Diagnosis:   1. Depression, unspecified depression type      no change    Plan and Follow-up: Patient plans to continue taking the medication as prescribed. Patient plans to follow up with therapy in two weeks.     Discharge Criteria/Planning: Patient will continue with follow-up until therapy can be discontinued without return of signs and symptoms.    Performed and documented by JOSAFAT Snyder, LICSW

## 2021-06-03 VITALS — BODY MASS INDEX: 47.74 KG/M2 | HEIGHT: 68 IN | WEIGHT: 315 LBS

## 2021-06-03 VITALS — HEIGHT: 68 IN | BODY MASS INDEX: 47.74 KG/M2 | WEIGHT: 315 LBS

## 2021-06-03 NOTE — PROGRESS NOTES
11/4/2019    Start time: 801    Stop Time: 850    Session # 10    Session Type: Patient is presenting for an Individual session.  Present: patient and therapist     Nick Coley is a 59 y.o. male is being seen today for    Chief Complaint   Patient presents with      Follow Up     Depression     Follow up in regards to ongoing symptom management of his depression in order to improve health outcomes and quality of life.     New symptoms or complaints: stomach pain    Functional Impairment:   Personal: 3  Family: 3  Social: 1  Work: 1    Clinical assessment of mental status:   Nick Coley presented on time.   He was oriented x3, open and cooperative, and dressed appropriately for this session and weather. His memory was Normal cognitive functioning .  His speech was  Within normal.  Language was normal.  Concentration and focus is Within normal. Psychosis is not noted or reported. He reports his mood is depressed.  Affect is congruent with speech and is Depressed.  Fund of knowledge is adequate. Insight is adequate for therapy.  The patients mental status was reviewed and remains unchanged from his session on 10/21/19.    Suicidal/Homicidal Ideation present: [facility]- low risk      Patient's impression of their current status: Patient comes to session with ongoing interpersonal issues/stressors related to family.      Therapist impression of patients current state: This 59 y.o. White or  male presents with ongoing mild/unspecified depression.  Affirmed the patient's follow through from last week.  Explored and validated his thoughts/feelings.  Used ACT to bring flexibility and creativity to understanding his children and stressors as we processed.  Patient was engaged and willing during processing.  He appears motivated.      Assessment tools used today include: C-SRRS    Type of psychotherapeutic technique provided: Client centered and MI, CBT      Review of medication: Adherence as MD  prescribed encouraged      Review of patient health/health concerns: no new concerns       Progress toward short term goals: Progressing as expected as evidenced by patient's reported handling and resolution of the acute stressors from previous session.     Review of long term goals: Not done at today's visit     Diagnosis:   1. Depression, unspecified depression type      no change    Plan and Follow-up: 1.  Patient to schedule for continued follow up psychotherapy appointments.    2.  Patient will use their crisis plan and/or access crisis services should symptoms       become worse.      3.  Patient to practice medication compliance.   4.  Patient encouraged to continue looking for ways to foster responsibility in his children that makes sense with their values and developmental stages.     Discharge Criteria/Planning: Patient will continue with follow-up until therapy can be discontinued without return of signs and symptoms.    Performed and documented by JOSAFAT Snyder, LICSW

## 2021-06-03 NOTE — PROGRESS NOTES
10/21/2019    Start time: 1001    Stop Time: 1050    Session # 9    Session Type: Patient is presenting for an Individual session.  Present: patient and therapist     Nick Coley is a 59 y.o. male is being seen today for    Chief Complaint   Patient presents with      Follow Up     Depression     Follow up in regards to ongoing symptom management of his depression in order to improve health outcomes and quality of life.     New symptoms or complaints: stomach pain    Functional Impairment:   Personal: 3  Family: 3  Social: 1  Work: 1    Clinical assessment of mental status:   Nick Coley presented on time.   He was oriented x3, open and cooperative, and dressed appropriately for this session and weather. His memory was Normal cognitive functioning .  His speech was  Within normal.  Language was normal.  Concentration and focus is Within normal. Psychosis is not noted or reported. He reports his mood is depressed.  Affect is congruent with speech and is Depressed.  Fund of knowledge is adequate. Insight is adequate for therapy.  The patients mental status was reviewed and remains unchanged from his session on 10/7/19.    Suicidal/Homicidal Ideation present: [place] administered- low risk      Patient's impression of their current status: Patient comes to session with complaints of children.  He believes they are not as responsible as they should be for their ages.  He often times feels taken advantage of.      Therapist impression of patients current state: This 59 y.o. White or  male presents with ongoing depression.  We reviewed assertive communication again as well as behavior modification strategies.  Patient was engaged, willing to explore and appeared motivated.      Assessment tools used today include: C-SRRS    Type of psychotherapeutic technique provided: Client centered and MI, CBT      Review of medication: Adherence as MD prescribed encouraged, patient reporting inconsistent       Review  of patient health/health concerns: no new concerns       Progress toward short term goals: Progressing as expected as evidenced by patient's reported handling and resolution of the acute stressors from previous session.     Review of long term goals: Not done at today's visit     Diagnosis:   1. Depression, unspecified depression type      no change    Plan and Follow-up: Patient plans to continue taking the medication as prescribed. Patient plans to follow up with therapy in two weeks.     Discharge Criteria/Planning: Patient will continue with follow-up until therapy can be discontinued without return of signs and symptoms.    Performed and documented by Julian Gomez, JOSAFAT, LICSW

## 2021-06-03 NOTE — PROGRESS NOTES
11/19/2019    Start time: 903    Stop Time: 950    Session # 11    Session Type: Patient is presenting for an Individual session.  Present: patient and therapist     Nick Coley is a 59 y.o. male is being seen today for    Chief Complaint   Patient presents with      Follow Up     Depression     Follow up in regards to ongoing symptom management of his depression in order to improve health outcomes and quality of life.     New symptoms or complaints: stomach pain    Functional Impairment:   Personal: 3  Family: 3  Social: 1  Work: 1    Clinical assessment of mental status:   Nick Coley presented on time.   He was oriented x3, open and cooperative, and dressed appropriately for this session and weather. His memory was Normal cognitive functioning .  His speech was  Within normal.  Language was normal.  Concentration and focus is Within normal. Psychosis is not noted or reported. He reports his mood is depressed.  Affect is congruent with speech and is Depressed.  Fund of knowledge is adequate. Insight is adequate for therapy.  The patients mental status was reviewed and remains unchanged from his session on 11/4/19.    Suicidal/Homicidal Ideation present: [place] administered- low risk      Patient's impression of their current status: Patient comes to session reporting how things seemed to be improving and going well until this last weekend.  He was disappointed with how the weekend went as his daughter was visiting from out of town.     Therapist impression of patients current state: This 59 y.o. White or  male presents with ongoing mild/unspecified depression.  Validated and explored the patient's thoughts/feelings.  Affirmed his ability to show positive regard for his kids despite the stressors of the family.  We explored ways to maintain this through taking an observational approach to his experience.      Assessment tools used today include: C-SRRS    Type of psychotherapeutic technique  provided: Client-centered and ACT      Review of medication: Adherence as MD prescribed encouraged      Review of patient health/health concerns: no new concerns       Progress toward short term goals: Progressing as expected as evidenced by patient's reported handling and resolution of the acute stressors from previous session.     Review of long term goals: Not done at today's visit     Diagnosis:   1. Depression, unspecified depression type      no change    Plan and Follow-up: 1.  Patient to schedule for continued follow up psychotherapy appointments.    2.  Patient will use their crisis plan and/or access crisis services should symptoms       become worse.      3.  Patient to practice medication compliance.   4.  Patient encouraged to continue looking for ways to foster responsibility in his children that makes sense with their values and developmental stages.   5.  Patient to practice observational approach to his experiences    Discharge Criteria/Planning: Patient will continue with follow-up until therapy can be discontinued without return of signs and symptoms.    Performed and documented by JOSAFAT Snyder, LICSW

## 2021-06-04 ENCOUNTER — RECORDS - HEALTHEAST (OUTPATIENT)
Dept: GENERAL RADIOLOGY | Facility: CLINIC | Age: 61
End: 2021-06-04

## 2021-06-04 ENCOUNTER — OFFICE VISIT - HEALTHEAST (OUTPATIENT)
Dept: INTERNAL MEDICINE | Facility: CLINIC | Age: 61
End: 2021-06-04

## 2021-06-04 DIAGNOSIS — Z01.818 ENCOUNTER FOR OTHER PREPROCEDURAL EXAMINATION: ICD-10-CM

## 2021-06-04 DIAGNOSIS — Z01.818 PREOP GENERAL PHYSICAL EXAM: ICD-10-CM

## 2021-06-04 DIAGNOSIS — I10 ESSENTIAL HYPERTENSION: ICD-10-CM

## 2021-06-04 DIAGNOSIS — E78.2 MIXED HYPERLIPIDEMIA: ICD-10-CM

## 2021-06-04 DIAGNOSIS — E66.01 MORBID OBESITY (H): ICD-10-CM

## 2021-06-04 DIAGNOSIS — E55.9 VITAMIN D DEFICIENCY: ICD-10-CM

## 2021-06-04 DIAGNOSIS — F32.1 MODERATE MAJOR DEPRESSION (H): ICD-10-CM

## 2021-06-04 DIAGNOSIS — G47.33 OSA (OBSTRUCTIVE SLEEP APNEA): ICD-10-CM

## 2021-06-04 DIAGNOSIS — E66.01 MORBID (SEVERE) OBESITY DUE TO EXCESS CALORIES (H): ICD-10-CM

## 2021-06-04 DIAGNOSIS — Z01.818 PRE-OP TESTING: ICD-10-CM

## 2021-06-04 LAB
ALBUMIN SERPL-MCNC: 4.1 G/DL (ref 3.5–5)
ALBUMIN UR-MCNC: NEGATIVE G/DL
ALP SERPL-CCNC: 54 U/L (ref 45–120)
ALT SERPL W P-5'-P-CCNC: 53 U/L (ref 0–45)
ANION GAP SERPL CALCULATED.3IONS-SCNC: 14 MMOL/L (ref 5–18)
APPEARANCE UR: CLEAR
APTT PPP: 35 SECONDS (ref 24–37)
AST SERPL W P-5'-P-CCNC: 51 U/L (ref 0–40)
ATRIAL RATE - MUSE: 72 BPM
BACTERIA #/AREA URNS HPF: ABNORMAL /[HPF]
BASOPHILS # BLD AUTO: 0 THOU/UL (ref 0–0.2)
BASOPHILS NFR BLD AUTO: 1 % (ref 0–2)
BILIRUB SERPL-MCNC: 0.6 MG/DL (ref 0–1)
BILIRUB UR QL STRIP: NEGATIVE
BUN SERPL-MCNC: 22 MG/DL (ref 8–22)
CALCIUM SERPL-MCNC: 9.6 MG/DL (ref 8.5–10.5)
CHLORIDE BLD-SCNC: 106 MMOL/L (ref 98–107)
CO2 SERPL-SCNC: 19 MMOL/L (ref 22–31)
COLOR UR AUTO: YELLOW
CREAT SERPL-MCNC: 1.04 MG/DL (ref 0.7–1.3)
DIASTOLIC BLOOD PRESSURE - MUSE: NORMAL
EOSINOPHIL # BLD AUTO: 0.1 THOU/UL (ref 0–0.4)
EOSINOPHIL NFR BLD AUTO: 2 % (ref 0–6)
ERYTHROCYTE [DISTWIDTH] IN BLOOD BY AUTOMATED COUNT: 13.7 % (ref 11–14.5)
GFR SERPL CREATININE-BSD FRML MDRD: >60 ML/MIN/1.73M2
GLUCOSE BLD-MCNC: 102 MG/DL (ref 70–125)
GLUCOSE UR STRIP-MCNC: NEGATIVE MG/DL
HCT VFR BLD AUTO: 44.4 % (ref 40–54)
HGB BLD-MCNC: 14.2 G/DL (ref 14–18)
HGB UR QL STRIP: NEGATIVE
HYALINE CASTS #/AREA URNS LPF: ABNORMAL LPF
IMM GRANULOCYTES # BLD: 0 THOU/UL
IMM GRANULOCYTES NFR BLD: 0 %
INR PPP: 1.15 (ref 0.9–1.1)
INTERPRETATION ECG - MUSE: NORMAL
KETONES UR STRIP-MCNC: ABNORMAL MG/DL
LEUKOCYTE ESTERASE UR QL STRIP: NEGATIVE
LYMPHOCYTES # BLD AUTO: 1.8 THOU/UL (ref 0.8–4.4)
LYMPHOCYTES NFR BLD AUTO: 24 % (ref 20–40)
MCH RBC QN AUTO: 26.5 PG (ref 27–34)
MCHC RBC AUTO-ENTMCNC: 32 G/DL (ref 32–36)
MCV RBC AUTO: 83 FL (ref 80–100)
MONOCYTES # BLD AUTO: 0.6 THOU/UL (ref 0–0.9)
MONOCYTES NFR BLD AUTO: 8 % (ref 2–10)
MUCOUS THREADS #/AREA URNS LPF: ABNORMAL LPF
NEUTROPHILS # BLD AUTO: 5 THOU/UL (ref 2–7.7)
NEUTROPHILS NFR BLD AUTO: 66 % (ref 50–70)
NITRATE UR QL: NEGATIVE
P AXIS - MUSE: -1 DEGREES
PH UR STRIP: 5 [PH] (ref 5–8)
PLATELET # BLD AUTO: 214 THOU/UL (ref 140–440)
PMV BLD AUTO: 9.9 FL (ref 7–10)
POTASSIUM BLD-SCNC: 4.4 MMOL/L (ref 3.5–5)
PR INTERVAL - MUSE: 186 MS
PROT SERPL-MCNC: 7.2 G/DL (ref 6–8)
QRS DURATION - MUSE: 82 MS
QT - MUSE: 394 MS
QTC - MUSE: 431 MS
R AXIS - MUSE: 16 DEGREES
RBC # BLD AUTO: 5.35 MILL/UL (ref 4.4–6.2)
RBC #/AREA URNS AUTO: ABNORMAL HPF
SODIUM SERPL-SCNC: 139 MMOL/L (ref 136–145)
SP GR UR STRIP: >=1.03 (ref 1–1.03)
SQUAMOUS #/AREA URNS AUTO: ABNORMAL LPF
SYSTOLIC BLOOD PRESSURE - MUSE: NORMAL
T AXIS - MUSE: 36 DEGREES
UROBILINOGEN UR STRIP-ACNC: ABNORMAL
VENTRICULAR RATE- MUSE: 72 BPM
WBC #/AREA URNS AUTO: ABNORMAL HPF
WBC: 7.5 THOU/UL (ref 4–11)

## 2021-06-04 ASSESSMENT — MIFFLIN-ST. JEOR: SCORE: 2309.72

## 2021-06-04 ASSESSMENT — PATIENT HEALTH QUESTIONNAIRE - PHQ9: SUM OF ALL RESPONSES TO PHQ QUESTIONS 1-9: 0

## 2021-06-04 NOTE — PROGRESS NOTES
12/2/2019    Start time: 802    Stop Time: 850    Session # 12    Session Type: Patient is presenting for an Individual session.  Present: patient and therapist     Nick Coley is a 59 y.o. male is being seen today for    Chief Complaint   Patient presents with      Follow Up     Depression     Follow up in regards to ongoing symptom management of his depression in order to improve health outcomes and quality of life.     New symptoms or complaints: stomach pain    Functional Impairment:   Personal: 3  Family: 3  Social: 1  Work: 1    Clinical assessment of mental status:   Nick Coley presented on time.   He was oriented x3, open and cooperative, and dressed appropriately for this session and weather. His memory was Normal cognitive functioning .  His speech was  Within normal.  Language was normal.  Concentration and focus is Within normal. Psychosis is not noted or reported. He reports his mood is euthymic.  Affect is congruent with speech.  Fund of knowledge is adequate. Insight is adequate for therapy.      Suicidal/Homicidal Ideation present: [place] administered- low risk      Patient's impression of their current status: Patient comes to session indicating positive family experiences over the holiday weekend.  He is optimistic about the upcoming holiday and reporting how things seem to be improving.  He continues to state difficulty with consistency in taking his medications.  He believes that stress and medications might explain his headaches.      Therapist impression of patients current state: This 59 y.o. White or  male presents with ongoing mild/unspecified depression. Validated and explored the patient's thoughts/feelings. Majority of session used to affirm the patient's recognition of positive things happening in his family and how noticing this makes him feel. We brainstormed about ways to increase this practice, even when stressful interactions are happening.  MI was used to address  motivation issues around medications.  Patient continues to be have a hard time consistently taking these, which could be a cause for his chronic headaches.         Assessment tools used today include: C-SRRS    Type of psychotherapeutic technique provided: Client-centered and ACT      Review of medication: Adherence as MD prescribed encouraged- patient presently reporting about 33% success rate at remembering to take medications.       Review of patient health/health concerns: no new concerns - continues to have headaches and occasional stomach aches      Progress toward short term goals: Progressing as expected as evidenced by patient's increased awareness to positive events in his family.     Review of long term goals: Not done at today's visit - agrees to review treatment plan at next session.     Diagnosis:   1. Depression, unspecified depression type      no change    Plan and Follow-up: 1.  Patient to schedule for continued follow up psychotherapy appointments.    2.  Patient will use their crisis plan and/or access crisis services should symptoms       become worse.      3.  Patient to practice medication compliance.   4.  Patient encouraged to continue looking for ways to foster responsibility in his children that makes sense with their values and developmental stages.   5.  Patient to practice observational approach to his experiences    Discharge Criteria/Planning: Patient will continue with follow-up until therapy can be discontinued without return of signs and symptoms.    Performed and documented by JOSAFAT Snyder, LICSW

## 2021-06-04 NOTE — PROGRESS NOTES
12/16/2019    Start time: 802    Stop Time: 850    Session # 13    Session Type: Patient is presenting for an Individual session.  Present: patient and therapist     Nick Coley is a 59 y.o. male is being seen today for    Chief Complaint   Patient presents with      Follow Up     Depression     Follow up in regards to ongoing symptom management of his depression in order to improve health outcomes and quality of life.     New symptoms or complaints: stomach pain    Functional Impairment:   Personal: 3  Family: 3  Social: 1  Work: 1    Clinical assessment of mental status:   Nick Coley presented on time.   He was oriented x3, open and cooperative, and dressed appropriately for this session and weather. His memory was Normal cognitive functioning .  His speech was  Within normal.  Language was normal.  Concentration and focus is Within normal. Psychosis is not noted or reported. He reports his mood is euthymic.  Affect is congruent with speech.  Fund of knowledge is adequate. Insight is adequate for therapy.  The patient's mental status was reviewed and remains unchanged from his session on 12/2/19.    Suicidal/Homicidal Ideation present: [place] administered- low risk      Patient's impression of their current status: Patient comes to session indicating a realization that his depressed mood seems to be seasonal or situational.  He is anticipating some issues with the holidays.  He and his wife were reflecting on his children and he wonders if their immaturity is caused by his lack of presence when they were younger.     Therapist impression of patients current state: This 59 y.o. White or  male presents with ongoing mild/unspecified depression. Validated and explored the patient's thoughts/feelings. Treatment plan reviewed, depression goal established.  The patient has not seen much improvement in this area.  His depression continues to seem to be subclinical and yet present and affecting him.  We  explored the importance of remaining committed and consistent with his medications and to working on thought adaptations from his automatic ones, thoughts that would be more helpful.  Patient agrees.  He appears motivated and willing.     Assessment tools used today include: C-SRRS    Type of psychotherapeutic technique provided: Client-centered and ACT      Review of medication: Adherence as MD prescribed encouraged- patient presently reporting about 33% success rate at remembering to take medications.  Reviewed and unchanged from 12/2.      Review of patient health/health concerns: no new concerns - continues to have headaches and occasional stomach aches. Reviewed and unchanged from 12/2      Progress toward short term goals: Progressing as expected as evidenced by patient's increased awareness to positive events in his family.       Review of long term goals: Treatment plan updated and signed    Diagnosis:   1. Depression, unspecified depression type      no change    Plan and Follow-up: 1.  Patient to schedule for continued follow up psychotherapy appointments.    2.  Patient will use their crisis plan and/or access crisis services should symptoms       become worse.      3.  Patient to practice medication compliance.   4.  Patient encouraged to continue looking for ways to foster responsibility in his children that makes sense with their values and developmental stages.   5.  Patient to practice observational approach to his experiences  6.  Patient to begin compliance with medications as prescribed by primary MD.     Discharge Criteria/Planning: Patient will continue with follow-up until therapy can be discontinued without return of signs and symptoms.    Performed and documented by JOSAFAT Snyder, LICSW

## 2021-06-04 NOTE — PROGRESS NOTES
Outpatient Mental Health Treatment Plan    Name:  Nick Coley  :  1960  MRN:  [id number]    Treatment Plan:  Updated Treatment Plan  Intake/initial treatment plan date:  19  Benefit and risks and alternatives have been discussed: Yes  Is this treatment appropriate with minimal intrusion/restrictions: Yes  Estimated duration of treatment:  Approximately 8-10 sessions.  Anticipated frequency of services:  Every 2 weeks  Necessity for frequency: This frequency is needed to establish therapeutic goals and for continuity of care in order to monitor progress.  Necessity for treatment: To address cognitive, behavioral, and/or emotional barriers in order to work toward goals and to improve quality of life.    Session Type: Patient is presenting for an Individual session.    Plan: Depression    Goal:  Decrease average depression level as measured subjectively by the PHQ 9 from 10 to less than 8 and maintain subjective measurement to be less than 5.   Strategies:    ?[x] Decrease social isolation     [x] Increase involvement in meaningful activities     ?[x] Discuss sleep hygiene     ?[x] Explore thoughts and expectations about self and others     ?[x] Process grief (loss of significant person, independence, role, etc.)     ?[] Assess for suicide risk     ?[x] Implement physical activity routine (with physician approval)     [] Consider introduction of bibliotherapy and/or videos     [x] Continue compliance with medical treatment plan (or explore barriers)   ?  ?Degree to which this is a problem: 3  Degree to which goal is met: 2  Date of Review: 3/16/19       Diagnosis:  Depression- unspecified     WHODAS 2.0 12-item version 24      Clinical assessments and measures completed:. SARAH-7 and PHQ-9     Strengths:  Responsible, commitment, dedication, caring, kind   Cultural Considerations: Mr. Coley was adopted and isn't sure about much of his birth family.  He does report growing up/raised [facility].  He does  have a strong familial connection and attachment.     Persons responsible for this plan: Patient and Provider            Psychotherapist Signature           Patient Signature:              Guardian Signature             Provider: Performed and documented by Julian Gomez [facility]   Date:  12/16/2019

## 2021-06-05 NOTE — PROGRESS NOTES
"1/20/2020    Start time: 1502    Stop Time: 1550    Session #2    Session Type: Patient is presenting for an Individual session.  Present: patient and therapist     Nick Coley is a 60 y.o. male is being seen today for    Chief Complaint   Patient presents with      Follow Up     Depression     Follow up in regards to ongoing symptom management of his depression in order to improve health outcomes and quality of life.     New symptoms or complaints: stomach pain    Functional Impairment:   Personal: 3  Family: 3  Social: 1  Work: 1    Clinical assessment of mental status:   Nick Coley presented on time.   He was oriented x3, open and cooperative, and dressed appropriately for this session and weather. His memory was Normal cognitive functioning .  His speech was  Within normal.  Language was normal.  Concentration and focus is Within normal. Psychosis is not noted or reported. He reports his mood is depressed and stressed.  Affect is congruent with speech.  Fund of knowledge is adequate. Insight is adequate for therapy.  The patient's mental status was reviewed and remains unchanged from his session on 1/13/20.    Suicidal/Homicidal Ideation present: none reported      Patient's impression of their current status: Patient comes to session with increased interpersonal stress which has affected his mood.  He reports feeling \"overwhelmed and stressed.\"     Therapist impression of patients current state: This 60 y.o. White or  male presents with ongoing mild/unspecified depression. Validated and explored the patient's thoughts/feelings. MI used to engage patient in exploration of his problem, highlighting/emphasizing his words/thoughts that suggest change is needed.  Patient does appear motivated and willing to move towards change.     Assessment tools used today include: no new tools used    Type of psychotherapeutic technique provided: Client-centered and ACT and CBT      Review of medication: reports " inconsistent adherence as MD prescribed     Review of patient health/health concerns: no new concerns     Progress toward short term goals: Progressing as expected as evidenced by patient's openness about stressors, willingness to explore change oriented action steps.     Review of long term goals: Treatment plan updated last 12/16/19    Diagnosis:   1. Depression, unspecified depression type      no change    Plan and Follow-up: 1.  Patient to schedule for continued follow up psychotherapy appointments.    2.  Patient will use their crisis plan and/or access crisis services should symptoms       become worse.      3.  Patient to practice medication compliance.   4.  Patient encouraged to continue looking for ways to foster responsibility in his children that makes sense with their values and developmental stages.   5.  Patient to talk with partner about talking to MD about anxiety and stress.   6.  Patient to begin compliance with medications as prescribed by primary MD.     Discharge Criteria/Planning: Patient will continue with follow-up until therapy can be discontinued without return of signs and symptoms.    Performed and documented by JOSAFAT Snyder, LICSW

## 2021-06-05 NOTE — PROGRESS NOTES
1/6/2020    Start time: 1404    Stop Time: 1450    Session # 1 (2020)    Session Type: Patient is presenting for an Individual session.  Present: patient and therapist     Nick Coley is a 60 y.o. male is being seen today for    Chief Complaint   Patient presents with      Follow Up     Depression     Follow up in regards to ongoing symptom management of his depression in order to improve health outcomes and quality of life.     New symptoms or complaints: stomach pain    Functional Impairment:   Personal: 3  Family: 3  Social: 1  Work: 1    Clinical assessment of mental status:   Nick Coley presented on time.   He was oriented x3, open and cooperative, and dressed appropriately for this session and weather. His memory was Normal cognitive functioning .  His speech was  Within normal.  Language was normal.  Concentration and focus is Within normal. Psychosis is not noted or reported. He reports his mood is euthymic.  Affect is congruent with speech.  Fund of knowledge is adequate. Insight is adequate for therapy.  The patient's mental status was reviewed and remains unchanged from his session on 12/16/19.    Suicidal/Homicidal Ideation present: none reported      Patient's impression of their current status: Patient comes to session with reported mild-moderate depressed mood.  He reports ongoing home stressors that have him questioning himself and his parenting abilities.      Therapist impression of patients current state: This 60 y.o. White or  male presents with ongoing mild/unspecified depression. Validated and explored the patient's thoughts/feelings. CBT used to explore how even accurate statements are sometimes unhelpful. Patient appeared open and willing to explore his thoughts more, looking for opportunities for challenging and adapting his thoughts.      Assessment tools used today include: no new tools used    Type of psychotherapeutic technique provided: Client-centered and ACT and  CBT      Review of medication: Adherence as MD prescribed encouraged    Review of patient health/health concerns: no new concerns     Progress toward short term goals: Progressing as expected as evidenced by patient's increased awareness to positive events in his family.       Review of long term goals: Treatment plan updated and signed    Diagnosis:   1. Depression, unspecified depression type      no change    Plan and Follow-up: 1.  Patient to schedule for continued follow up psychotherapy appointments.    2.  Patient will use their crisis plan and/or access crisis services should symptoms       become worse.      3.  Patient to practice medication compliance.   4.  Patient encouraged to continue looking for ways to foster responsibility in his children that makes sense with their values and developmental stages.   5.  Patient to practice observational approach to his experiences  6.  Patient to begin compliance with medications as prescribed by primary MD.     Discharge Criteria/Planning: Patient will continue with follow-up until therapy can be discontinued without return of signs and symptoms.    Performed and documented by JOSAFAT Snyder, LICSW

## 2021-06-05 NOTE — PROGRESS NOTES
2/3/2020    Start time: 1402    Stop Time: 1450    Session #3    Session Type: Patient is presenting for an Individual session.  Present: patient and therapist     Nick Coley is a 60 y.o. male is being seen today for    Chief Complaint   Patient presents with      Follow Up     Depression     Follow up in regards to ongoing symptom management of his depression in order to improve health outcomes and quality of life.     New symptoms or complaints: stomach pain    Functional Impairment:   Personal: 3  Family: 3  Social: 1  Work: 1    Clinical assessment of mental status:   Nick Coley presented on time.   He was oriented x3, open and cooperative, and dressed appropriately for this session and weather. His memory was Normal cognitive functioning .  His speech was  Within normal.  Language was normal.  Concentration and focus is Within normal. Psychosis is not noted or reported. He reports his mood is depressed and stressed.  Affect is congruent with speech.  Fund of knowledge is adequate. Insight is adequate for therapy.  The patient's mental status was reviewed and remains unchanged from his session on 1/20/20.    Suicidal/Homicidal Ideation present: none reported      Patient's impression of their current status: Patient comes to session with continued interpersonal stressors.  He reports no changes or completion of action items from his previous session.     Therapist impression of patients current state: This 60 y.o. White or  male presents with ongoing mild/unspecified depression. MI used to explore and progress motivation for change with patient.  Motivation/follow through on short term action goals would be greatest barrier to change.     Assessment tools used today include: no new tools used    Type of psychotherapeutic technique provided: Client-centered and ACT and CBT      Review of medication: reports inconsistent adherence as MD prescribed     Review of patient health/health concerns: no  new concerns     Progress toward short term goals: Poor progress- patient did not follow through on action items from last session. We reviewed his progress on goals be attributed to follow through.     Review of long term goals: Treatment plan updated last 12/16/19    Diagnosis:   1. Depression, unspecified depression type      no change    Plan and Follow-up: 1.  Patient to schedule for continued follow up psychotherapy appointments.    2.  Patient will use their crisis plan and/or access crisis services should symptoms       become worse.      3.  Patient to practice medication compliance.   4.  Patient encouraged to continue looking for ways to foster responsibility in his children that makes sense with their values and developmental stages.   5.  Patient to talk with partner about talking to MD about anxiety and stress.   6.  Patient to begin compliance with medications as prescribed by primary MD.     Discharge Criteria/Planning: Patient will continue with follow-up until therapy can be discontinued without return of signs and symptoms.    Performed and documented by JOSAFAT Snyder, LICSW

## 2021-06-07 ENCOUNTER — COMMUNICATION - HEALTHEAST (OUTPATIENT)
Dept: INTERNAL MEDICINE | Facility: CLINIC | Age: 61
End: 2021-06-07

## 2021-06-07 DIAGNOSIS — I10 BENIGN ESSENTIAL HYPERTENSION: ICD-10-CM

## 2021-06-08 NOTE — PROGRESS NOTES
"Nick Coley is a 60 y.o. male who is being evaluated via a billable video visit.      The patient has been notified of following:     \"This video visit will be conducted via a call between you and your physician/provider. We have found that certain health care needs can be provided without the need for an in-person physical exam.  This service lets us provide the care you need with a video conversation.  If a prescription is necessary we can send it directly to your pharmacy.  If lab work is needed we can place an order for that and you can then stop by our lab to have the test done at a later time.    Video visits are billed at different rates depending on your insurance coverage. Please reach out to your insurance provider with any questions.    If during the course of the call the physician/provider feels a video visit is not appropriate, you will not be charged for this service.\"    Patient has given verbal consent to a Video visit? Yes        Patient would like the video invitation sent by: Text to cell phone: [phone]    Will anyone else be joining your video visit? No        Video Start Time: 2 PM    Additional provider notes:     Office Visit - Follow Up   Nick Coley   60 y.o. male    Date of Visit: 5/29/2020    Chief Complaint   Patient presents with     leg sore     open area on ankle        Assessment and Plan   1. Cellulitis of left lower extremity  Redness warmth and pain involving left lower extremity consistent with cellulitis.  Cannot completely exclude presence of abscess without further exam or imaging.  Will begin Keflex 500 mg 4 times daily for 10 days and depending on response, consider the need for CT or ultrasound next week.  He will give me an update on his progress.  - cephalexin (KEFLEX) 500 MG capsule; Take 1 capsule (500 mg total) by mouth 4 (four) times a day for 10 days.  Dispense: 40 capsule; Refill: 0    No follow-ups on file.     History of Present Illness   This 60 y.o. old " gentleman with hypertension who called earlier today reporting increasing redness associated with a wound on his left lower leg.  Video visit performed.  For the last 7 to 10 days, he has had increasing pain and redness in his left lower leg.  He cannot recall any specific injury or trauma to his leg but was carrying some heavy appliances 2 days before this developed.  There is redness warmth and pain in the left lower leg.  There is an area of purulent drainage.  No fevers or chills.    Review of Systems: No nausea.  Not feeling lightheaded or dizzy.  .     Medications, Allergies and Problem List   Patient Active Problem List   Diagnosis     Essential hypertension     Abnormal LFTs (liver function tests)     Morbid obesity (H)     LBP (low back pain)     Hyperlipidemia     Nephrolithiasis     Irritable bowel syndrome     Microscopic hematuria     Suspected sleep apnea     Vitamin D deficiency     Moderate major depression (H)     Chronic tension-type headache, not intractable     History of colon polyps     Cellulitis of left lower extremity       He has a past surgical history that includes Carpal tunnel release (Right, 2001); Extracorporeal shock wave lithotripsy (2013); pr knee scope,single menisectomy (Right, 12/22/2015); and [place] tooth extraction (2013).    Allergies   Allergen Reactions     Adhesive Tape-Silicones Rash     Silk tape not paper tape     Other Allergy (See Comments) Rash     Silk tape not paper tape       Current Outpatient Medications   Medication Sig Dispense Refill     aspirin 325 MG EC tablet TAKE ONE TABLET BY MOUTH DAILY 90 tablet 1     citalopram (CELEXA) 20 MG tablet Take 1 tablet (20 mg total) by mouth daily. 90 tablet 3     ergocalciferol (VITAMIN D2) 50,000 unit capsule Take 1 capsule (50,000 Units total) by mouth once a week. 12 capsule 3     lisinopril-hydrochlorothiazide (PRINZIDE,ZESTORETIC) 10-12.5 mg per tablet Take 1 tablet by mouth daily. 90 tablet 3     rosuvastatin  (CRESTOR) 10 MG tablet Take 1 tablet (10 mg total) by mouth daily. 90 tablet 3     cephalexin (KEFLEX) 500 MG capsule Take 1 capsule (500 mg total) by mouth 4 (four) times a day for 10 days. 40 capsule 0     No current facility-administered medications for this visit.         Physical Exam   General Appearance:   Well-appearing middle-age male    There were no vitals taken for this visit.        Left lower leg with diffuse erythema which appears to be consistent with cellulitis.     Additional Information   Social History     Tobacco Use     Smoking status: Never Smoker     Smokeless tobacco: Never Used     Tobacco comment: cigar once or twice a year   Substance Use Topics     Alcohol use: Yes     Comment: just during softball season or vacation     Drug use: No            Long Weston MD      Video-Visit Details    Type of service:  Video Visit    Video End Time (time video stopped): 2:20 PM  Originating Location (pt. Location): Home    Distant Location (provider location):  [facility] INTER[facility] MEDIC[facility]     [facility]      Long Weston MD

## 2021-06-08 NOTE — TELEPHONE ENCOUNTER
Spoke with the patient and relayed message below from Dr. Weston.  He verbalized understanding and had no further questions at this time.  Patient has been scheduled as requested below.  Mamie JACKSON CMA/CAROLIN....................1:03 PM

## 2021-06-08 NOTE — TELEPHONE ENCOUNTER
New Appointment Needed  What is the reason for the visit:    Patient called to schedule appointment to have site check on his left leg.   Patient states for the last about 2 weeks he has had an area on his left lower leg near the ankle more to the outside of the leg which is having an issue. Stated this site is inferior to his previous injury site for a softball hit, patient states that injury happened a few years ago.  Patient states now this site near the left ankle has a recurring open area. The open area has had drainage, from clear to yellowish(patient states has looked like snot coming out).  Area is red and spreading.    Patient states can be warm to touch and can get very warm to touch (like on fire)   Does have occasion sharp pain that runs through the area, but patient states he is not having issues with mobility.  Patient states he has not been covering site with bandage just leave area open to air.  Does sit for work during day with his pant leg pulled up.     Please advise if patient would need in person visit or VV appointment.  Provider Preference: PCP preferred   How soon do you need to be seen?: soon as able   Waitlist offered?: No  Okay to leave a detailed message:  Yes

## 2021-06-09 NOTE — TELEPHONE ENCOUNTER
Pt on Keflex for 10 days on 5/29/2020 for possible cellulitis in lower left leg. Sx did improve with antibiotic.      Pt feels like this is flaring back up again.   Having redness, warmth and pain again in that leg.   Started back again 4-5 days ago.  A week or so after antibiotic was completed.   Denies feeling feverish.   Some swelling located 4-6 inches above ankle on the shin.     Patient was advised recommendation per protocol states ED/UCC or office with PCP approval.  RN will route to PCP for further recommendations as patient has recently seen provider for the same symptoms.     Please advise.    Dee Hernandez RN   [facility] RN Triage    Additional Information    Negative: Chest pain    Negative: Difficulty breathing    Negative: Entire foot is cool or blue in comparison to other side    Negative: Unable to walk    Negative: Followed a hip injury    Negative: Followed a knee injury    Negative: Followed an ankle or foot injury    Negative: Back pain radiating (shooting) into leg(s)    Negative: Foot pain is the main symptom    Negative: Ankle pain is the main symptom    Negative: Knee pain is the main symptom    Negative: Leg swelling is the main symptom    Negative: Looks like a broken bone or dislocated joint (e.g., crooked or deformed)    Negative: Sounds like a life-threatening emergency to the triager    Negative: Fever and red area (or area very tender to touch)    Negative: Fever and swollen joint    Negative: Thigh or calf pain in only one leg and present > 1 hour    Thigh, calf, or ankle swelling in only one leg    Protocols used: LEG PAIN-A-[place]

## 2021-06-09 NOTE — PROGRESS NOTES
Office Visit - Follow Up   Nick Coley   60 y.o. male    Date of Visit: 7/8/2020    Chief Complaint   Patient presents with     Cellulitis     lower leg        Assessment and Plan   1. Cellulitis and abscess of leg  Recurrent cellulitis with possible abscess left lower leg.  Will send wound culture to evaluate for MRSA.  Resume Keflex but will continue for a total of 2 weeks.  We will proceed with imaging initially with ultrasound looking for abscess and consider surgical referral.  - cephalexin (KEFLEX) 500 MG capsule; Take 1 capsule (500 mg total) by mouth 4 (four) times a day for 14 days.  Dispense: 56 capsule; Refill: 0  - Culture/Gram Stain: Wound  - US Leg Non Vascular Left; Future    2. Essential hypertension  Blood pressure poorly controlled despite lisinopril/HCTZ and will have him double his current dose and get him a new prescription for 20/25 mg tablets.  Recheck blood pressure in 3 months at his physical which is being scheduled today.  - lisinopriL-hydrochlorothiazide (ZESTORETIC) 20-25 mg per tablet; Take 1 tablet by mouth daily.  Dispense: 90 tablet; Refill: 3    Return in about 4 months (around 11/5/2020) for Annual physical.     History of Present Illness   This 60 y.o. old male with hypertension with recent treatment of cellulitis involving left lower leg responding to 10 days of Keflex at a dose of 500 mg 4 times daily.  Infection developed an area of previous trauma and hematoma.  May have had a bug bite in the region that subsequently became infected.  After initial improvement following 10 days of antibiotics, redness and pain has returned.  There is some swelling in the area.  There is some slight drainage raising concern for possible abscess.    We also discussed blood pressure control.  Blood pressure severely elevated today despite taking lisinopril/TZ 10/12.5 mg daily.  He reports that he is compliant taking medication every day.  Work and life remains stressful.    Review of Systems:  No fevers or chills  otherwise, a comprehensive review of systems was negative except as noted.     Medications, Allergies and Problem List   Patient Active Problem List   Diagnosis     Essential hypertension     Abnormal LFTs (liver function tests)     Morbid obesity (H)     LBP (low back pain)     Hyperlipidemia     Nephrolithiasis     Irritable bowel syndrome     Microscopic hematuria     Suspected sleep apnea     Vitamin D deficiency     Moderate major depression (H)     Chronic tension-type headache, not intractable     History of colon polyps     Cellulitis of left lower extremity       He has a past surgical history that includes Carpal tunnel release (Right, 2001); Extracorporeal shock wave lithotripsy (2013); pr knee scope,single menisectomy (Right, 12/22/2015); and [place] tooth extraction (2013).    Allergies   Allergen Reactions     Adhesive Tape-Silicones Rash     Silk tape not paper tape     Other Allergy (See Comments) Rash     Silk tape not paper tape       Current Outpatient Medications   Medication Sig Dispense Refill     aspirin 325 MG EC tablet TAKE ONE TABLET BY MOUTH DAILY 90 tablet 1     cephalexin (KEFLEX) 500 MG capsule Take 1 capsule (500 mg total) by mouth 4 (four) times a day for 14 days. 56 capsule 0     citalopram (CELEXA) 20 MG tablet Take 1 tablet (20 mg total) by mouth daily. 90 tablet 3     ergocalciferol (VITAMIN D2) 50,000 unit capsule Take 1 capsule (50,000 Units total) by mouth once a week. 12 capsule 3     lisinopriL-hydrochlorothiazide (ZESTORETIC) 20-25 mg per tablet Take 1 tablet by mouth daily. 90 tablet 3     rosuvastatin (CRESTOR) 10 MG tablet Take 1 tablet (10 mg total) by mouth daily. 90 tablet 3     No current facility-administered medications for this visit.         Physical Exam   General Appearance:   Overweight but otherwise well-appearing middle-age male    BP (!) 182/90 (Patient Site: Left Arm, Patient Position: Sitting, Cuff Size: Adult Large)   Pulse 78   Ht  "5' 7.5\" (1.715 m)   Wt (!) 360 lb (163.3 kg)   SpO2 95%   BMI 55.55 kg/m          Area of erythema, warmth and tenderness left lower leg with slight bloody drainage.  There is a suggestion of fluctuance.     Additional Information   Social History     Tobacco Use     Smoking status: Never Smoker     Smokeless tobacco: Never Used     Tobacco comment: cigar once or twice a year   Substance Use Topics     Alcohol use: Yes     Comment: just during softball season or vacation     Drug use: No            Long Weston MD  "

## 2021-06-09 NOTE — TELEPHONE ENCOUNTER
Spoke with the patient and relayed message below from Dr. Weston.  He verbalized understanding and has scheduled an appointment for tomorrow afternoon.  Patient had no further questions at this time.  Mamie JACKSON CMA/CAROLIN....................3:58 PM

## 2021-06-09 NOTE — PROGRESS NOTES
Office Visit - Follow Up   Nick Coley   57 y.o. male    Date of Visit: 3/22/2017    Chief Complaint   Patient presents with     Sore Throat     x 1 day. Sxs: body aches and sore throat. No fever, no cough, no running nose, etc        Assessment and Plan   1. Sore throat  Given the recent strep going around his house will check strep screen.  We'll also check flu given his symptoms.  Otherwise likely due to viral syndrome and rest and fluids suggested.  - Rapid Strep A Screen-Throat  - Group A Strep, RNA Direct Detection, Throat  - Influenza A/B Rapid Test    2. Borderline low O2 saturation  Concerning.  This may be due to some restrictive lung disease due to his morbid morbid obesity.  He should follow up with Dr. Aburto regarding this.    3. Morbid obesity due to excess calories  As above.    4. Daytime somnolence  I'm concerned about and treated sleep apnea in this gentleman.  Especially with his low oxygen levels.  I've asked that he follow up closely with Dr. Aburto in next few weeks.        Return in about 4 weeks (around 4/19/2017) for Recheck.     History of Present Illness   This 57 y.o. old nice patient of Dr. Aburto's comes in today as an urgent add for sore throat.  Morbidly obese gentleman with a BMI over 50.  He comes in because he has a sore throat and myalgias and fatigue.  His wife has a .  He works odd hours as a .  A lot of the kids at the  have been sick.  He is worried about this.  He states that he only sleeps about 5 hours at night.  He is tired during the day.  Does not fall sleep at work or at the wheel.  He snores quite loudly according to his wife and friends, especially if he is very tired or has been drinking.  He denies shortness of breath.  He denies high fevers.  No known flu contacts.     Review of Systems: A comprehensive review of systems was negative except as noted.     Medications, Allergies and Problem List   Reviewed and updated      Physical Exam   General Appearance:   Morbidly obese gentleman.  O2 sat is somewhat low on room air.  He has a very large neck with very small oropharynx.  No oropharyngeal erythema or exudate.  Neck is supple.  Lungs are clear.    Visit Vitals     /74 (Patient Site: Right Arm, Patient Position: Sitting, Cuff Size: Adult Large)     Pulse 72     Temp 98.7  F (37.1  C)     Wt (!) 335 lb (152 kg)     SpO2 94%     BMI 50.94 kg/m2            Additional Information   Current Outpatient Prescriptions   Medication Sig Dispense Refill     aspirin 325 MG EC tablet Take 1 tablet (325 mg total) by mouth daily. 90 tablet 3     citalopram (CELEXA) 20 MG tablet TAKE 1 TABLET BY MOUTH DAILY 30 tablet 11     ibuprofen (ADVIL,MOTRIN) 200 MG tablet Take 800 mg by mouth daily as needed for pain.       lisinopril-hydrochlorothiazide (PRINZIDE,ZESTORETIC) 10-12.5 mg per tablet TAKE 1 TABLET BY MOUTH DAILY 90 tablet 3     No current facility-administered medications for this visit.      No Known Allergies  Social History   Substance Use Topics     Smoking status: Never Smoker     Smokeless tobacco: None     Alcohol use Yes       Review and/or order of clinical lab tests:  Review and/or order of radiology tests:  Review and/or order of medicine tests:  Discussion of test results with performing physician:  Decision to obtain old records and/or obtain history from someone other than the patient:  Review and summarization of old records and/or obtaining history from someone other than the patient and.or discussion of case with another health care provider:  Independent visualization of image, tracing or specimen itself:    Time: not applicable     Niko Castaneda MD

## 2021-06-11 ENCOUNTER — AMBULATORY - HEALTHEAST (OUTPATIENT)
Dept: LAB | Facility: CLINIC | Age: 61
End: 2021-06-11

## 2021-06-11 ENCOUNTER — RECORDS - HEALTHEAST (OUTPATIENT)
Dept: ADMINISTRATIVE | Facility: OTHER | Age: 61
End: 2021-06-11

## 2021-06-11 DIAGNOSIS — Z11.59 ENCOUNTER FOR SCREENING FOR OTHER VIRAL DISEASES: ICD-10-CM

## 2021-06-11 NOTE — PROGRESS NOTES
"  Nick Coley is a 60 y.o. male who is being evaluated via a billable telephone visit.      The patient has been notified of following:     \"This telephone visit will be conducted via a call between you and your physician/provider. We have found that certain health care needs can be provided without the need for a physical exam.  This service lets us provide the care you need with a short phone conversation.  If a prescription is necessary we can send it directly to your pharmacy.  If lab work is needed we can place an order for that and you can then stop by our lab to have the test done at a later time.    If during the course of the call the physician/provider feels a telephone visit is not appropriate, you will not be charged for this service.\"     Nick Coley complains of  No chief complaint on file.      I have reviewed and updated the patient's Past Medical History, Social History, Family History and Medication List.    ALLERGIES  Adhesive tape-silicones and Other allergy (see comments)    Additional provider notes:      Initial Structured Weight Loss Supervised Diet Evaluation     Assessment:  Pt. is being seen today for initial RD nutritional evaluation. Pt. has been unsuccessful with non-surgical weight loss methods and is interested in bariatric surgery. Today we reviewed current eating habits and level of physical activity, and instructed on the changes that are required for successful bariatric outcomes.    Surgery of interest per pt: LSG.    Workflow review:  Support Group: Not completed.  Psychology:Not completed.  Lab work:Completed.  SWL:Yes 2nd Visit     Weight goal: 6 lbs .    Patient Active Problem List:  Patient Active Problem List   Diagnosis     Essential hypertension     Abnormal LFTs (liver function tests)     Morbid obesity (H)     LBP (low back pain)     Hyperlipidemia     Nephrolithiasis     Irritable bowel syndrome     Microscopic hematuria     Suspected sleep apnea     Vitamin D " deficiency     Moderate major depression (H)     Chronic tension-type headache, not intractable     History of colon polyps     Cellulitis of left lower extremity       Pt's Weight: (!) 351 lb 14.4 oz (159.6 kg)  Current Weight: 351.9 lbs (per pt's report)   BMI: There is no height or weight on file to calculate BMI.    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 per day)  Water: at least 64 oz/day   Alcohol: rarely  Other: Sweet Tea (0-1 time/week), 1% Milk on the Weekends     Exercise  Type: no set regimen, was playing softball up until recently, Biking at work for 15-20 minutes     PES statement:   1. Overweight/Obesity (NC 3.3) related to overeating and poor lifestyle habits as evidenced by patient's subjective statements (excessive energy intake, inadequate protein intake, portion control) and BMI of 54.30 kg/m2.     Intervention    Nutrition Education:    1. Provided general overview of diet and lifestyle modifications needed to be a deemed a safe candidate for bariatric surgery.     Food/Nutrient Delivery:  2. Educated pt on eating three meals, with cutting out snacking.  3. Bariatric Plate: Pt and I discussed the importance of including a lean protein source (20-30 grams/meal), vegetables (included at lunch and dinner), one serving (15g) of carbohydrate, and limited added fat (1 tb/day) at each meal.   4. Discussed importance of adequate hydration after surgery, with goal of at least 64 oz of fluids/day.  5. Addressed avoiding all carbonated, caffeinated and sweetened drinks to prepare for bariatric surgery.     Nutrition Counselin. Mindful eating techniques: Encouraged slow meal pace, chewing foods to applesauce consistency for 20-30 minutes/meal.   7. Discussed  fluids 30 minutes before, during, and after meal to prevent dumping syndrome and discomfort post bariatric surgery.       Instructions/Goals:     1. Aim for 20-30 grams of protein/meal, 3 meals/day.   2. Start working on keeping fluid separate from meals (30 minute rule)     Handouts Provided:   Pt. [facility] B[facility]ric [facility] Patient Handbook    Monitor/Evaluation:  Pt. s target weight: weight loss of 6 lbs, pt. verbalized understanding.     Plan for next visit:   Bariatric plate.   Educate on dumping syndrome and reading food labels.  (Final Supervised Diet visit with KELLY) pre/post-op  diet progression, give review of surgery process.  Review [place] to Bariatric Success    Assessment/Plan:  1. Obesity, morbid, BMI 50 or higher (H)      2. Hypertension, unspecified type      3. Mixed hyperlipidemia      4. Nutritional counseling        Phone call duration: 30 minutes    Zaina Cerda RD

## 2021-06-11 NOTE — PROGRESS NOTES
"Nick Coley is a 60 y.o. male who is being evaluated via a billable video visit.      The patient has been notified of following:     \"This video visit will be conducted via a call between you and your physician/provider. We have found that certain health care needs can be provided without the need for an in-person physical exam.  This service lets us provide the care you need with a video conversation.  If a prescription is necessary we can send it directly to your pharmacy.  If lab work is needed we can place an order for that and you can then stop by our lab to have the test done at a later time.    Video visits are billed at different rates depending on your insurance coverage. Please reach out to your insurance provider with any questions.    If during the course of the call the physician/provider feels a video visit is not appropriate, you will not be charged for this service.\"    Patient has given verbal consent to a Video visit? Yes    Video-Visit Details    Type of service:  Video Visit    Video End Time (time video stopped): 9:56 AM  Originating Location (pt. Location): Home    Distant Location (provider location):  [facility] [place]     Platform used for Video Visit: [email or web address]      Bunny Pablo, Ph.D,    Health and Behavior Assessment with Intervention, Initial (60 minutes): Met with patient 1:1 to obtain demographics and background information, reasons for surgery, typical eating pattern/fluid intake as well as psychiatric history.  Nick is a 60-year-old  male who would like to follow through with vertical sleeve gastrectomy for health reasons.  He has struggled with his weight for much of his life and now is concerned about various comorbidities.  He has a history of depression for which she did go through psychotherapy last year in the beginning of this year.  He feels the symptoms are much improved.  He denied any history of disordered eating.  " He has good knowledge of the surgery and good support.  He will follow-up and complete psychological testing.  Diagnoses: F 50.9; E 66.01

## 2021-06-11 NOTE — PROGRESS NOTES
"New Bariatric Surgery Consultation Note    2020    RE: Nick Coley  MR#: [id number]  : 1960      Referring provider:   BAR REFERRING PROVIDER 2020   Who referred you? Dr. Cortez       Chief Complaint/Reason for visit: evaluation for possible weight loss surgery    Dear Long Weston MD,    I had the pleasure of seeing your patient, Nick Coley, to evaluate his obesity and consider him for possible weight loss surgery. As you know, Nick Coley is 60 y.o..  He has a height of Height: 5' 7.5\" (1.715 m)  , a weight of   Vitals:    20 1442   Weight: (!) 351 lb 14.4 oz (159.6 kg)   , and calculated Body mass index is 54.3 kg/m .  He has co-morbid fatty liver disease as evident from  ultrasound and central adiposity with hyperlipidemia (LDL 180s/ Triglycerides over 300, low HDL) on statin therapy and hypertension on dual medication ACEI/HCTZ and likely sleep apnea that he's been resistant in the past to get evaluated as he has trouble sleeping outside the home. He was open to a sleep medicine referral for consideration of home sleep study. If found to have ANAM we'd like him to have treatment for at least 6 weeks prior to surgery to have the least complication risk possible. 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 With the loss of regular walking at work his weight has continued to gain the last couple of years.       Plan:  1. Welcome to the bariatric surgery program. Read your manual a couple times monthly.  2. Given high risk for sleep apnea with witness apneic spells, referral to sleep clinic for consideration of home study given your inability to sleep well outside the home.  3. Intake labs can be done today, I'll message the results about a week from now.  4. No alcohol the month prior to surgery and none for 6 months after.  5. Actigall is anticipated for 6 months to reduce the risk of " gallstones.  6. Think about your fitness goals, commit to 3-5 days a week of aerobic and strength work.  7. If struggling to see weight reduction these next 6 weeks, call and I'll be happy to assist further. Possible recheck November if needed.  8. Anticipate holding HCTZ during liquid diet, OK to continue ACEI alone during those 1-2 weeks.        HISTORY OF PRESENT ILLNESS:  Weight Loss History Reviewed with Patient 9/21/2020   How long have you been overweight? Since late 20's to early 40's   What is the most that you have ever weighed? 360   What is the most weight you have lost? 50   I have tried the following methods to lose weight Watching portions or calories   I have tried the following weight loss medications? (Check all that apply) None   Have you ever had weight loss surgery? No     Custody of granddaughter, still cosleeping at 5yo, poor sleep.   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  Rotating shift work lately at [employer] security job.    CO-MORBIDITIES OF OBESITY INCLUDE:  No flowsheet data found.    PAST MEDICAL HISTORY:  Past Medical History:   Diagnosis Date     Abnormal LFTs (liver function tests) 12/18/2015    Negative hepatitis C antibody and ferritin of 330 6 December 2015, steatohepatitis suspected     Cellulitis of left lower extremity 5/29/2020     Cellulitis, face 2014     Chronic tension-type headache, not intractable 6/10/2019     Depression with anxiety      Essential hypertension 12/18/2015     Hematoma of leg, left, subsequent encounter 7/23/2018     History of colon polyps     Colonoscopy November 2018 with multiple polyps removed, repeat in 3 years     Hyperlipidemia      Irritable bowel syndrome      Kidney stones     x 4     LBP (low back pain)      Microscopic hematuria 7/23/2018     Moderate major depression (H) 6/10/2019     Morbid obesity (H) 12/18/2015     Nephrolithiasis     2013, 2009, 2001      Suspected sleep apnea 11/16/2018     Tear of meniscus of right knee 12/18/2015    Associated with right tibial plateau fracture 2013, arthroscopic knee surgery December 2015     Vitamin D deficiency     Vitamin D 10.0 November 2018     Wrist fracture        PAST SURGICAL HISTORY:  Past Surgical History:   Procedure Laterality Date     CARPAL TUNNEL RELEASE Right 2001     EXTRACORPOREAL SHOCK WAVE LITHOTRIPSY  2013    for kidney stones     AR KNEE SCOPE,SINGLE MENISECTOMY Right 12/22/2015    Procedure: RIGHT KNEE ARTHROSCOPIC MENISCECTOMY;  Surgeon: Chandu Moyer MD;  Location: [facility] OR;  Service: Orthopedics     VASECTOMY       WISDOM TOOTH EXTRACTION  2013       FAMILY HISTORY:   Family History   Adopted: Yes   Problem Relation Age of Onset     Diabetes Mother      Anesthesia problems Neg Hx        SOCIAL HISTORY:   Social History Questions Reviewed With Patient 9/21/2020   Which best describes your employment status (select all that apply) I work full-time   If you work, 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 Yes   Who do you have in your support network that can be available to help you for the first 2 weeks after surgery? my wife   Who can you count on for support throughout your weight loss surgery journey? my wife   Can you afford 3 meals a day?  Yes   Can you afford 50-60 dollars a month for vitamins? Yes       HABITS:  BAR SURGERY - HABITS 9/21/2020   How often do you drink alcohol? Monthly or less   If you do drink alcohol, how many drinks might you have in a day? (one drink = 5 oz. wine, 1 can/bottle of beer, 1 shot liquor) 3 or 4   Do you currently use any of the following Nicotine products? Cigars very rarely end of softball season.   Have you ever used any of the folowing nicotine products? Cigars   Have you or are you currently using street drugs or prescription strength medication for which you do not have a prescription  for? No   Do you have a history of chemical dependency (alcohol or drug abuse)? No       PSYCHOLOGICAL HISTORY:   Psychological History Reviewed With Patient 9/21/2020   Have you ever attempted suicide? Never   Have you had thoughts of suicide in the past year? No   Have you ever been hospitalized for mental illness or a suicide attempt? Never   Do you have a history of chronic pain? No   Have you ever been diagnosed with fibromyalgia? No   Are you currently being treated for any of the following? Depression   Are you currently seeing a therapist or counselor?  Yes   Are you currently seeing a psychiatrist? Yes       ROS:  BAR NBS ROS 9/21/2020   Skin: None of the above   HEENT: Headaches   Musculoskeletal: Back pain   Cardiovascular: Shortness of breath with activity   Pulmonary: Shortness of breath with activity, People have told me I stop breathing while asleep   Gastrointestinal: Diarrhea   Genitourinary: Kidney stones   Hematological: None of the above   Neurological: None of the above       EATING BEHAVIORS:  BAR SURGERY - EATING BEHAVIORS 9/21/2020   Have you or anyone else thought that you had an eating disorder? No   Do you currently binge eat (eat a large amount of food in a short time)? No   Are you an emotional eater? No   Do you get up to eat after falling asleep? No       EXERCISE:  BAR SURGERY - EXERCISE 9/21/2020   How often do you exercise? Less than 1 time per week   What is the duration of your exercise (in minutes)? 10 Minutes   What types of exercise do you do? walking   What keeps you from being more active?  I should be more active but i just have not gotten around to it. 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       MEDICATIONS:  Current Outpatient Medications   Medication Sig Dispense Refill     aspirin 325 MG EC tablet TAKE ONE TABLET BY MOUTH DAILY 90 tablet 1     cholecalciferol, vitamin D3, 5,000 unit Tab Take by mouth 3  "(three) times a day.       citalopram (CELEXA) 20 MG tablet Take 1 tablet (20 mg total) by mouth daily. 90 tablet 3     lisinopriL-hydrochlorothiazide (ZESTORETIC) 20-25 mg per tablet Take 1 tablet by mouth daily. 90 tablet 3     rosuvastatin (CRESTOR) 10 MG tablet Take 1 tablet (10 mg total) by mouth daily. 90 tablet 3     No current facility-administered medications for this visit.        ALLERGIES:  Allergies   Allergen Reactions     Adhesive Tape-Silicones Rash     Silk tape not paper tape     Other Allergy (See Comments) Rash     Silk tape not paper tape       LABS/IMAGING/MEDICAL RECORDS REVIEW: u/s report 2001 w/ fatty liver.    PHYSICAL EXAM:  Vitals:    09/25/20 1442   BP: 138/76     Height: 5' 7.5\" (1.715 m) (9/25/2020  2:42 PM)  Weight: (!) 351 lb 14.4 oz (159.6 kg) (9/25/2020  2:42 PM)  BMI (Calculated): 54.3 (9/25/2020  2:42 PM)  SpO2: 95 % (7/8/2020  4:11 PM)  Waist Circumference (In): 62.5 Inches (9/25/2020  2:42 PM)  Hip Circumference (In): 60 Inches (9/25/2020  2:42 PM)  Neck Circumference (In): 19 Inches (9/25/2020  2:42 PM)    General: NAD, central adiposity.  CV: RRR  PULM: CTA  ABD, central adiposity. Non tender. No rash.  EXT. Mild edema pretibial. No tremor.  SKIN: hyperpigmentation left shin/venous congestion.  Neuro: no tremor.    In summary, Nick Coley has Class III obesity with a body mass index of Body mass index is 54.3 kg/m . kg/m2 and the comorbidities stated above. He completed an informational seminar and is a candidate for the laparoscopic gastric sleeve   Once the patient has completed the requirements in their task list and there are no further recommendations, the pt will be allowed to see the surgeon of her choice for consultation on the laparoscopic gastric sleeve surgery. Patient verbalizes understanding of the process to surgery and expectations for the postoperative period including the need for lifelong lifestyle changes, vitamin supplementation, and laboratory " monitoring.  Sincerely,     Irvin Branch MD    I spent a total of 60 minutes face to face with the patient during today's office visit. Over 50% of this time was spent counseling the patient and/or coordinating care.

## 2021-06-11 NOTE — PATIENT INSTRUCTIONS - HE
Plan:  1. Welcome to the bariatric surgery program. Read your manual a couple times monthly.  2. Given high risk for sleep apnea with witness apneic spells, referral to sleep clinic for consideration of home study given your inability to sleep well outside the home.  3. Intake labs can be done today, I'll message the results about a week from now.  4. No alcohol the month prior to surgery and none for 6 months after.  5. Actigall is anticipated for 6 months to reduce the risk of gallstones.  6. Think about your fitness goals, commit to 3-5 days a week of aerobic and strength work.  7. If struggling to see weight reduction these next 6 weeks, call and I'll be happy to assist further.  8. With shift work, plan to get your first meal within 2-3 hours of waking up, then having a meal every 4-5 hours, each with 30 grams of protein per meal. Let your supper be your last food until you go to sleep.        Before being submitted for insurance approval, you will need the following:    -Clearance by the Psychologist  -Clearance by the dietitian  -Attend Support Group ([address] [place] [address] at [facility]) 2nd Tuesday of the month 6:30-8pm. Make sure to sign in.  -Routine Health Care Maintenance must be up to date (mammograms yearly after age 40, paps as recommended by your primary provider,  colonoscopy after age 50, earlier if high risk.  -If you are on estrogen, estrogen will be discontinued one month prior to surgery. It may be resumed one month after surgery unless otherwise advised to limit blood clotting risks.  -Pre Operative Lab work-ordered today.    -Structured weight loss visits IF mandated by your insurance carrier or bariatrician.  -Surgeon consult as we get nearer to potential surgery or sooner if you have special considerations that may make your surgery more complex.  -If you have sleep apnea you will need to be using CPAP for at least one month before surgery to reduce your risk of breathing problems after  surgery. Make sure to bring your CPAP or BiPAP to the hospital at the time of surgery.    -You will need to be tobacco free for 2 months before surgery and remain a non-smoker thereafter. If you are currently smoking or have recently quit, your urine will be evaluated for tobacco metabolites pre-operatively.  Smoking is a major risk factor for developing ulceration of your surgical sites and potential severe complications.    -If you are on insulin, you might be referred to an endocrinologist who will manage your insulin during the liquid diet and around the time of surgery. This endocrinologist does not replace your primary provider or your endocrinologist.   -You will need an exercise plan which includes MOVE, ie., walking and MUSCLE, ie.,calisthenics, bands, weight, machines, etc...Maintenance of long term weight loss and quality of life is much improved with regular exercise as the weight comes off.  ______________________________________________________________________    Remember that after your bariatric surgery, vitamin supplementation is a lifelong need.    You will take:    B-12 300-500mcg or higher sublingual (under the tongue) daily or by 1000mcg injection 1-2X/month  D3:  3000- 5000 IUs daily   Multivitamin containing 18mg of iron twice a day  Calcium citrate 1 or 2 daily    To keep your weight off and your vitamin levels up, follow-up is important.    Your labs will be monitored every 6 months for the first two years (every 3 months if you had a duodenal switch) and yearly thereafter.    To avoid ulcers in your stomach avoid tobacco forever, alcohol in excess, caffeine in excess and anti-inflammatories (NSAIDS)  (Aspirin, Ibuprofen, Naproxen and similar medications). Tylenol is fine at usual doses on the box.    If you are told by your physician take Aspirin to protect your heart or for another reason, make sure to take omeprazole or similar medication (protonix, nexium, prevacid) to protect your  stomach.    Remember that alcohol affects you differently after bariatric surgery. If you have even ONE drink DO NOT DRIVE due to rapid absorption and fast spiking of blood alcohol levels within minutes of consumption.     Slowly Increasing your exercise capacity such that 3-6 months after your surgery you're tolerating 150-250 minutes of exercise weekly will help optimize your metabolism and improve the chance of good weight loss maintenance.              LEAN PROTEIN SOURCES  Getting 20-30 grams of protein, 3 meals daily, is appropriate for most people, some need more but more than about 40 grams per meal is not useful.  General rule is drinking one ounce of water per gram of protein eaten over the course of the day:  70 grams of protein each day, drink 70 oz of water.  Protein Source Portion Calories Grams of Protein                           Nonfat, plain Greek yogurt    (10 grams sugar or less) 3/4 cup (6 oz)  12-17   Light Yogurt (10 grams sugar or less) 3/4 cup (6 oz)  6-8   Protein Shake 1 shake 110-180 15-30   Skim/1% Milk or lactose-free milk 1 cup ( 8 oz)  8   Plain or light, flavored soymilk 1 cup  7-8   Plain or light, hemp milk 1 cup 110 6   Fat Free or 1% Cottage Cheese 1/2 cup 90 15   Part skim ricotta cheese 1/2 cup 100 14   Part skim or reduced fat cheese slices 1 ounce 65-80 8     Mozzarella String Cheese 1 80 8   Canned tuna, chicken, crab or salmon  (canned in water)  1/2 cup 100 15-20   White fish (broiled, grilled, baked) 3 ounces 100 21   [place]/Tuna (broiled, grilled, baked) 3 ounces 150-180 21   Shrimp, Scallops, Lobster, Crab 3 ounces 100 21   Pork loin, Pork Tenderloin 3 ounces 150 21   Boneless, skinless chicken /turkey breast                          (broiled, grilled, baked) 3 ounces 120 21   Flora, Hart, [place], and Venison 3 ounces 120 21   Lean cuts of red meat and pork (sirloin,   round, tenderloin, flank, ground 93%-96%) 3 ounces 170 21   Lean or Extra Lean  Ground Turkey 1/2 cup 150 20   90-95% Lean [place] Burger 1 omkar 140-180 21   Low-fat casserole with lean meat 3/4 cup 200 17   Luncheon Meats                                                        (turkey, lean ham, roast beef, chicken) 3 ounces 100 21   Egg (boiled, poached, scrambled) 1 Egg 60 7   Egg Substitute 1/2 cup 70 10   Nuts (limit to 1 serving per day)  3 Tbsp. 150 7   Nut [place] (peanut, almond)  Limit to 1 serving or less daily 1 Tbsp. 90 4   Soy Burger (varies) 1  15   Garbanzo, Black, Garcia Beans 1/2 cup 110 7   Refried Beans 1/2 cup 100 7   Kidney and Lima beans 1/2 cup 110 7   Tempeh 3 oz 175 18   Vegan crumbles 1/2 cup 100 14   Tofu 1/2 cup 110 14   Chili (beans and extra lean beef or turkey) 1 cup 200 23   Lentil Stew/Soup 1 cup 150 12   Black Bean Soup 1 cup 175 12             Exercise Guidance    Nearly everything that bothers us gets better when the proper amount of exercise can be done in the proper amounts.  Getting to that level safely and without injury is the key.  When it comes to weight loss, exercise is especially important in maintaining the weight loss.  Unfortunately, one of the harsh realities is that substantial weight loss slows our metabolism, often anywhere from 5-20%.    Our brain always remembers our heaviest weight and we can return to that if we're not mindful and moving regularly.  Our biology doesn't understand the concept of having too much energy, only not having enough.  As such, when we lose weight, it's thought that the brain interprets this as we're ill or in a famine and dials back our metabolism to limit further weight loss.  This is why exercise is so important in keeping the weight off and is the main reason people have some weight regain from their low weight point after weight loss.  We have to make up that 10-20% of calories not being burned.Since we can restrict our intake for only so long, exercise becomes very important in our long term healthy  weigh maintenance to balance out the occasional indiscretion with our diet.    Generally, for every 5% body weight reduction in a weight loss season, a person needs to add  kilocalories of exercise in their daily routine to keep that weight off for the long term.  This is why it's vital to be starting your fitness regimen during weight loss season, so that routine is well established as you move into your maintenance period.    Additionally, all sorts of good enzymes and genes turn on with exercise and our stress, sleep, mood and bodies feel better when we can get to the point of making ourselves a little sweaty and short of breath 35-50 minutes most days of the week. But we have to start with what we can do first and give ourselves permission to work our way up to this goal.    Who isn't ready for exercise? Well, if you get severe dizziness/palpitations, chest pain or short of breath/faint with even minimal activity like walking across a room or you're having to pause while going up a flight of stairs, then getting your heart and/or lungs fully evaluated prior to starting an exercise regimen is recommended. Everyone else can probably start a program, but everyone may start at a different point:  Some can set a 5-10 minute walking goal and others will be able to ride their bike for an hour.      Start with where you're at and look to add 10% more each week until you're at that 150 minutes or more a week (or 75 minutes/week or more of vigorous exercise). Moderate exercise can be estimated as the pace you can carry on a conversation and vigorous is the pace at which you can get 3-5 words out before having to take a breath.  If you're using heart rate monitoring, Moderate is about 60% of your maximum heart rate and vigorous about 75%. (Max heart rate estimated as 220 beats minus your age:  Example: 220-age of 46 =176 Beats per minute (BPM) maximum. 0.6X 176= 105 BPM (moderate), 132 BMP(vigorous)).    If you like  "to count steps, the 10,000 steps per day does correlate well with weight maintenance but try to make at least 20-25% of those steps at a brisk pace (like you are about to miss your bus).    Finally, if you are pressed for time, it's important to know that some exercise is better than none.  High Intensity Interval training (HIIT) is a good way to get as much out of a short period of working out. If you can't walk, use the stairs, bike or swim; you could use a punching/arm workout regimen for your activity.  The idea with HIIT is to have a 3-6 minute warm up period of low intensity and the 3-6 \"intervals\" where you push the intensity up and then recover and start the next interval. One study showed that 3 intervals of 20 seconds at \"Maximum Effort\" while either biking on a stationary bike or going up stairs and then having 100 seconds recovery time before the next Maximum Effort was equally as beneficial on cardiovascular fitness development as doing 30 minutes of moderately paced walking 3 days weekly over a 6 week period of time.  So intensity matters. You just need to be able to safely do your desired exercise without injury. There are many great HIIT exercises/routines out there. IF you're not doing much exercise currently, I recommend giving your self 2-3 weeks of moderate exercise, 3 days weekly minimum to get your bones/tendons/muscles used to exercise before going for High Intensity workouts.    If you like to use Apps on the phone, the couch to 5k klaus and 7 minute workout apps are nice places to start if you are reasonably healthy.  There are hundreds of other options out there.  Consider viewing [employer] if gentler exercise/movement is desired. Videos on Alfredo Chi and chair yoga for seniors exist and are free. Check them out and let's get that 3-4 days a week routine going.    Let's move!  Irvin Branch MD.     MEDICATIONS FOR WEIGHT LOSS  There are several medications available to assist us in weight loss.  " By themselves, without compliance to a change in diet and increase in movement/activity these medications are disappointing in their results. However, combined with a closely monitored program of diet change and exercise they can be very effective in controlling appetite and boosting initial weight loss.  All weight loss medications need continual re-evaluation for efficacy as their side effects and health benefits fail to be worthwhile if a person is not continuing to lose weight or in maintaining their healthy weight.  Some weight loss medications are scheduled drugs, meaning there is at least a theoretical possibility for developing addiction to them but in practice this is rare.  We do anticipate coming off meds in the future after stabilization of weight loss is assurred.  Finally, a tolerance can develop and people s perceived efficacy of medication can diminish.  In communication with your physician, it may be appropriate to intermittently take a break from these medications and then restart again (few weeks off then restart again) if a plateau is reached that cannot be broken through.  Each person can respond to a medication differently and to be a good option for you, it will need to be affordable, effective and well tolerated with minimal side effects.    In most cases, weight loss progress after one month and three months will be obtained and if a patient is not reaching the satisfactory progress towards weight loss, the medications may be discontinued.  The thought is that if a person is taking a weight loss medication and not receiving the potential health benefit of that drug, the side effects are not worthwhile and use should be discontinued.  On the flip side, there are many people on some weight loss medications for years because it continues to be an effective tool in their weight management and they are tolerating the medication without any long-term side effects.  Each person's response and  purpose will be evaluated.      PHENTERMINE (Adipex): approved in 1959 for appetite suppression.  It has stimulant effects and cannot be used with Ritalin, Concerta, or other stimulants.  Although it is not highly addictive, it's chemically related to amphetamines which are addictive.  Occasional dependence can develop, but rarely. The most common side effects are dry mouth, increased energy and concentration, increased pulse, and constipation.  You should not take phentermine if you have glaucoma, hyperthyroidism, or uncontrolled/untreated hypertension or overly anxious. You should stop if dramatic mood swings, severe insomnia, palpations, chest pains, visual changes or if your Blood Pressure is consistently elevated or any time it's over 160/90.   It's ok to go off the med for a few weeks and restart if efficacy is wearing off.  $24-$30 for 90 tablets at [facility]. Females are required to have reliable birth control to reduce the risk birth defects/miscarriage.      TOPIRAMATE (Topamax): Anti-seizure medication, also used to prevent migraines and sometimes for mood stabilization.  Side effects include paresthesia, glaucoma, altered concentration, attention difficulties, memory and speech problems, metabolic acidosis, depression, increase in body temperature and decrease sweating, risk of kidney stones.  Do not take Topamax while taking Depakote as this can cause high ammonia levels.  You must have reliable birth control as Topamax can cause birth defects.  If prolonged use has occurred it should be tapered off slowly to avoid withdrawal issues.  Insurance usually covers Topiramate.  At higher doses, there may be some confusion/forgetfulness associated with this so we try to limit dose to under 75mg twice daily to reduce this risk. Often covered by insurance as it's used for many reasons.  Topamax will cause carbonated beverages to taste bad. A recheck of your kidney/electrolytes may occur within a few months  of starting.    QSYMIA (Phentermine + Topamax):  See above information about phentermine and Topamax.  Most common side effects are paresthesia, dizziness, distortion of taste, insomnia, constipation, and dry mouth.  See above descriptions for the two individual agents.Females are required to have reliable birth control to reduce the risk birth defects/miscarriage.  $150-$220 per month      Liraglutide (Victoza/Saxenda):   Part of the family of Glucagon Like Peptide Agonists, liraglutide directly suppresses appetite and is often used by diabetic patients due to decrease liver production of glucose, thereby improving glucose levels.  It also slows how quickly the stomach empties. May be hard to get covered for non diabetics and is an injectable medication delivered via a injector pen. It can be very costly without insurance coverage (over $500/month).  Small risk for pancreatitis and dose should be held if increased mid abdominal pain/burning. It is not to be used if previous Multiple Endocrine Neoplasia. In rodents, may increase risk of thyroid tumors and not indicated for anyone with hx of medullary thyroid cancer as a result.  If changes in voice/swallowing should be discontinued. Reliable birth control required in women.    Contrave (Bupropion/Naltrexone).    Synergistic combination of a mild appetite suppressing anti-depressant (Bupropion) whose effects are increased due to interaction with Naltrexone.  Naltrexone may have some effects on craving and is often used in addiction medicine to help previous opiate addicts be less prone to relapse as it blocks the action of opiates. Should be stopped if any need for opiate pain medication, surgery or planned procedures where you'll be given sedation/anesthesia. If prolonged use recommend stepping down bupropion over 2-3 weeks to limit any risk of withdrawal issues. Side effects may include dry mouth, increased heart rate, mild elevation in Blood pressure;  dizziness,  "ringing in the ears, anxiety (typically due to bupropion), nausea, constipation, and some get fatigued with naltrexone.  About $210 on [facility] for 120 tabs of \"Contrave\", the brand name without insurance coverage. Generic Bupropion 75mg: $25 for 120 tabs, Naltrexone: $55 for 90 tabs without insurance coverage on [employer]. Cannot be used if pregnant/trying to conceive or breast feeding.      Plenity:  not yet available.  2-3 capsules taken 20 minutes before 2 meals daily provides crystals that expand into a gel that provides a mechanical fullness. Gel mixes with your next meal and increases satisfaction by bulking the meal up to feel bigger than it truly is. Plenity is not absorbed and gets passed through the digestive tract and excreted in stools. May cause some bloating/gas/full feeling as it behaves like a fiber in many ways. Cost not available yet. FDA cleared in March of 2019 but not available in stores as of March of 2020. Clearance safety and efficacy data done under \"Gelesis\" name. Not appropriate for people with stomach or bowel motility issues as requires you to pass it through digestive tract.       What makes a person succeed with dramatic and sustained weight loss?    It's being at the right point in your life where you feel the need to lose the weight, not because anyone told you so but because of a voice inside of you that says, \"I am ready for this\".  You're now at a point where you may be feeling anxious, irritable and when you look in the mirror you do not recognize the person looking back.  Your healthy self is buried somewhere in that reflexion and you want to free it again.  This is the sort of motivation that leads to success, and it comes from you.    Because the only person that can lose that extra weight is you, I like my patients to focus on the mindset of being in Weight Loss Season.  This gives you permission to make the changes necessary to be consistent with the diet/activity and " "behavior changes that lead to successful, healthy weight loss.  Nearly any diet plan can work for weight loss, but keeping it healthy and nutrient based to prevent deficiencies/hair loss/fatigue or irritability is vital.  If you have a plan you want to try, we'll work with you to make sure no adjustments are needed to keep you healthy through your weight loss season and working with our Bariatric Dieticians you'll be given expert guidance to customize your diet plan to suit your particular needs. If you don't have your own ideas in mind, we are always happy to suggest well researched and validated plans that provide enough food to prevent hunger but still tap into your excess fat reserves and lose weight in a sustainable fashion.  There is great evidence that lean protein/healthy fat intake with good fiber intake while minimizing simple starches/carbs produces reliable/sustainable weight loss in most people. But some feel more connected to an intermittent fasting/fast mimicking or ketogenic diet.  These protocols can be hard for many to stick with and that's why we prefer the protein/healthy fat focused diets but if these alternative strategies appeal to you, we can work with you to optimize your knowledge and results with these tools.    Losing weight is a temporary commitment, but you need to be \"All In\" to have a good weight loss season.  To avoid frustration, you have to be willing to be on track 19 out of 20 days or even better than that. But, weight loss season is generally only 4-8 months in length. After that length of time, it can be hard to maintain a negative calorie balance and our brain, motivations and metabolism will usually bring you to a plateau that cannot be broken in this modern world where other commitments start to take priority. That's when we look to stabilize the weight loss you've achieved.  If you've reached your goal by that time, fantastic, and job well done.  If there is more to go, " "then after a few months of stabilization, we can usually attack that previous plateau and break into new territory.    Because of this time limitation, we want to really get to work right away and get into a sustainable routine ASAP.  One of the best predictors of how much weight you're going to lose throughout the season is how much you lose in the first 6 weeks, so prepare well and jump in with both feet.      Occasionally, people may feel like they cannot commit fully to the changes necessary and may want to change one thing at a time and \"get used to\" the idea of losing weight.  That is OK because that is where they are in their life, and they cannot fully commit for any number of reasons.  It's part of that internal motivation and they just haven't reached PRIORTY NUMBER ONE status yet. It's possible that what they need is more time to reach that point and I am always willing to work with people that want to \"dabble\", but understand, the amount of success obtained with half measures, is much less than half results. Behavior Change cannot occur until we prepare our minds, bodies and environment for what is to come, action!    As you go through your plan, look for things to keep your motivation rolling.  The most successful people have a goal or target/reward that they are working towards.  Having a reward that celebrates your new fitness, mobility and energy is the best sort because it will encourage you to do well with the weight maintenance phase and long term lifestyle changes that promotes keeping the weight off for the long term.  Usually, \"getting healthier\" or improving blood tests or losing weight so your clothes fit better is not as internally motivating as having a tangible reward.  A good weight loss season reward is one that isn't food based, is affordable, but something special:  Something you won't be getting/doing unless you achieve your goal.   It s important to keep to the rule of success:  in " order to get the reward, your goal MUST be achieved. Write this reward down, where you can look at it daily and keep it in the front of your mind as you go through your weight loss season and it will help keep you on track.    Tools that help change behavior are vital for success. The most studied and most supported tool for weight loss is nothing more than writing down your food and weight every day.  Every Day.  Accurately and completely.  When you commit to weight loss season, this information tells you whether you're getting ENOUGH food to fuel your weight loss properly as well as teaches you the interaction between different foods you eat and how your body responds with weight loss.  You'll see that sometimes after a heavy workout you don't see the scale move until 2-3 days later.  How saltier meals (chili for example) may make you retain water for 4-5 days before you see the weight come off, you'll get used to the mini-plateaus that develop after a good 3-8 lb drop in weight as well as how you break through if you keep working the diet as you should.    Weight loss is not a linear process, there are mini ups and downs.  Learning how your body loses weight and getting comfortable with that is very rewarding. The act of writing words on paper also solidifies your will power and commitment to the season of weight loss and that by itself changes your brain chemistry/appetite, motivation and prepares you for maximal success.     Behavior change is all about getting into a new routine.  The old habits and routine have to change because without changing the circumstances of how you gained your weight, it's unlikely you'll enjoy satisfying results. If you have snacking habits, like every time you walk through the kitchen you grab a little something, well, that habit has to change and be replaced by a new habit.  It can be something as simple as keeping a doodle pad on the counter that you make a few scribbles and then  "walk through the kitchen having not opened the cupboard, or starting with a glass of water and leaving the kitchen without anything else, or checking your food journal to see how many calories you have left for the day.  Boredom is the enemy as are the old habits. Break new ground and try to push those old habits into a deep hole.  There are apps/counseling options available that can help with some of the day to day urges/behaviors if you're struggling. One commercial product that does a good job is [employer].  Unfortunately, there is a subscription fee.    Finally, exercise always helps.  While not mandatory to lose weight, every little bit helps and exercise has so many other benefits that to not work it into your plan is to miss out on all the mood, sleep, stress and general health benefits that come from making yourself a little short of breath and sweaty at least 3-4 days out of the week.  The metabolism and calorie burn benefits aside, almost every chronic ailment in medicine gets better with proper, aerobic exercise.  Allow yourself to start slow and let your body prepare itself to accept harder training 4-6 weeks down the road, but start now and commit to a plan.  Whether you have the means to hire a , join a gym or just walk out your front door or go down to your basement for a video workout, get into a exercise routine and  after 3 weeks of at least 3 times a week exercise you should be at a point where you can slowly start ramping up 10% each week to our goal of at least 150-300minutes weekly of aerobic exercise and at least twice weekly resistance training/strenghtening with weights/bands or body weight exercises.     I am a big fan of modifying the free training plan, \"Couch to 5k\", for almost all of my patients. Just type it into [employer] or look it up on your smart phone klaus store.  To modify the plan,  you can use the training plan for whatever aerobic activity you do " "(bike/treadmill/elliptical/rower/pool/etc). During the \"jog\" intervals, you just move a little faster or harder or increase the tension or incline.  You use those little intervals to switch up the workout and recruit more muscles and pump the blood a little more and then recover again in the \"walk\" intervals by slowing down, decreasing the incline or turning down the tension.  3-4 days a week is not that much to ask and the benefits are enormous.  Start slow and develop the base from which you can then build on and reduce the risk of injury.  It's much more important 2-4 months from now to be enjoying your exercise then it is to over exert yourself at the start and hurt yourself.  Starting slowly allows your body to accept the training better down the road when the exercise becomes crucial for weight maintenance.  Without exercise down the road during your maintenance phase, all this hard work you are about to put in can be undone. It usually takes about 100-300 calories a day of exercise to maintain a weight loss and our focus during weight loss season is to generate the routine/activities and hobbies that make that enjoyable/sustainable.    Thanks for taking this first and most important step in your weight loss season.  Commit to it and we will cheerlead you all the way to success.  When things get tough or off track we'll offer guidance and analysis and when you reach your goal we'll celebrate your success.  In the end, it is all about your success, your health and what you do with it.      Irvin Branch MD  [facility]  [phone]      Example Meal Plan for a 4631-4501 Calorie Diet:  Nick, for you add 30% to this to get enough food. About 2000kcal/day. 100g of protein daily.    In order to fuel your weight loss properly and avoid hunger-induced overeating later in the day, for your height and weight, you will enjoy the most success by following the diet below or similar with " adjustments based on your particular tastes and preferences.  Exercise may influence speed, amount of weight loss further.     I recommend getting into a meal routine and keeping it similar day to day in the beginning so you don t have to think too hard about what you re going to make/eat.  Keep snacks healthy, ideally containing protein and some vegetables.  Non-processed food is preferable to packaged items.  Eat at least a few crunchy green vegetables if having a snack, which should be 2-3 hours after your mealtimes(prepare these ahead of time for ease of use).  Drink 64 oz -80 oz of water daily for most, some of you will need more and we'll discuss it at your visit if that is the case.  When changing our diet and reducing our intake,  we can often mistake thirst for hunger or just have some grazing habits we have to break ('bored/mindless eating') and a glass of water and reconsideration of our hunger is often all that is needed.  Having the urge is not the problem, but watching it pass by without acting on it is the goal.    If you re having hunger problems, add a protein drink/snack to your morning hours or afternoon snack with at least 20grams of protein and not too much sugar (under 10g).  A carton of higher protein/low sugar yogurt can work as well.  If the urge to snack is overwhelming and not satiated, try going for a 10 minute walk/exercise, come home and drink a glass of water and if still hungry, have a  calorie snack (handful of raw/sprouted nuts, veggies and string cheese, protein bar, etc).  Savor it.    It is better to have a large breakfast, a moderate lunch and a smaller dinner to fuel your day.  People lose 10-15% more weight during their weight loss season with this strategy. Optimizing your protein intake at each meal will further keep you more satisfied while eating less food overall.  Getting exercise in early has also been shown to offer the best results (before breakfast ideally but  anytime is the right time to exercise if that is not an option for you).    To make sure you re getting adequate vitamins and minerals during weight loss, I recommend one complete multivitamin a day of your choice.  Consider a probiotic and taking some vitamin D 2000 IU daily.    Let supper be your last meal of the day and ideally try to have at least 12 hours between supper and breakfast the next day to tap into some beneficial overnight fasting dynamics.  Water in the evening is fine, unsweetened, non caffeinated herbal tea is helpful as well.  Consolidating your meals within a 8-12 hour period of your day will help tap into these additional metabolic benefits and tends to keep your appetite up for breakfast, further helping to stay on track.  For most of my patients I don't recommend an intermittent fasting style diet (many find it hard to fit in their lifestyle) but an overnight fast is very doable for most patients and helps regulate our hunger drives a little better.  This makes it very important to nail good intake at all three meals to feel satisfied/energized and still lose weight.  If evening snacking desires are high, consider a glass of fiber supplement for some additional fullness (metamucil or similar). Most of us don't get the 25-30 grams per day of fiber that promotes good gut health/satiety.  Benefiber, metamucil, citrucel are reasonable/affordable options for most people.  Inulin, chicory, psyllium husk are reasonable options but start slow and low in the dose to avoid gas/bloating until your gut gets acclimated (ramping up to 5-10 grams per day of supplemental fiber after 3-4 weeks if needed).      Example Meal Plan:  Breakfast: 450-475 Calories  1 egg cooked on low in olive oil:   calories.  5oz Greek Yogurt (Fage plain classic: ~150 moises)  Handful of Berries of your choice (about a calorie per berry or 20-40cal per handful)    cup(cooked) of  old fashioned oatmeal or 1/2 cup(cooked) steel  cut oats. (150 moises)  Sprinkle amount of brown sugar and a pat of butter. (40 moises)  Glass of  Water  Black coffee or unsweetened Tea (0calories).      2-3 hours Later Snack: (195 calories).  Glass of water  One string Cheese (80 calories) or 4 oz creamed cottage cheese (115 calories) with  Crunchy Celery sticks (less than 10 calories per large stalk) 2 stalks. (20 calories)    of a  Large Banana or   of a Large Apple (60 calories):  eat second half at lunch or afternoon snack.     Lunch:300 -350 calories   Chicken Breast  (baked/broiled/roasted/grilled)  4-6 oz.  (125-180 moises), BBQ sauce/hot sauce/mustard/seasoning is free. Just use a reasonable amount. Or a can of tuna with 1 tablespoon mayonnaise.  Salad: lettuce, any other veggies (cucumbers, green peppers/celery you like and a small drizzle of dressing to just flavor.  Go as big on the veggies as you like,  as they are practically calorie free.   A whole, 8 inch cucumber is 45 calories, a whole green pepper is 23 calories, a stalk of celery is 9 calories.  [place] Dressing is 60 calories per tablespoon..so moderate your desired dressing or do a drizzle of olive oil and splash of balsamic vinegar on top,  Total calories unlikely to be over 150 even with dressing.  Glass of Water.    Option for lunch is meal replacement protein drink/smoothie.  Need at least 20 grams of protein and eat the rest of your apple/banana from the morning snack.      Afternoon Snack: 150-200 calories   Cheese Stick or cottage cheese again  and a fresh fruit OR  Granola Bar (protein Bar acceptable if under 200 calories OR  Homemade smoothies:  8oz skim milk,  a handful of berries (fresh or frozen and a serving of protein powder such as BiPro or Chyna sWhey for example.  If you don't like dairy, make with 8oz water, one small banana, handful of berries and the protein powder, add any veggies you want as well:  roughly 200 calories.   Glass of Water    Dinner: 325 calories  4oz of  fresh, Atlantic salmon.  Broiled (salt/pepper/dill) for about 8-8.5 minutes (200calories) or  4oz filet mignon steak or sirloin steak  Salad or vegetable sautéed lightly in olive oil or   Broccoli 1.5  cups chopped and steamed  or micro-waved in a little water (75 calories)  Glass of Water,    Cup of herbal tea (unsweetened, caffeine free)      Herbs and seasonings are encouraged to flavor your foods/vegetables.  Make your food delicious.      Tips for Success:  1.  Prepare proteins ahead of time (broil chicken breasts in bulk so you can grab and go), steel cut oats/lentils can be stored in casserole dish/bowl in the fridge for quick scoop in the morning and rewarm in microwave, make use of crock pot recipes (watch salt content).  Making meals that cover 3-4 future meals is an easy way to stay on track.  2.  Drink a 8-12 oz glass of water every 2-3 hours when awake.  We often mistake hunger for thirst, especially when losing weight.  3. Remember your Reward and Motivation when things get hard.  4.  Weigh yourself every morning and record, you'll stay on track better and learn how our biorhythms, diet and elimination patterns show up on the scale. Don't worry about 1 or 2 day patterns, but when on track you'll notice good trend downward of weight over 3-4 day segments.  Plateaus tend to resolve after 4-8 days in most cases if you stay consistent with your plan.  These are natural and part of weight loss, even if you're perfect with your plan execution.  5. Call if problems/concerns.  [employer] is a great tool to stay in touch and provide weekly outside accountability. Check in with questions or if you want to brag.  6.  Find a handful of meals/foods that keep you on track and feeling good and get into a routine that is sustainable for you.  It's OK to have a routine that works for you.  7.  Consider taking a complete multivitamin just to make sure all micronutrients are adequate during weight loss.  8. If losing  "hair/brittle nails it usually means you are not taking enough protein.  Minimum goal is 60 grams daily of protein for smaller women, 80 grams a day for men. Consider taking Biotin as supplement or a \"Hair and Nail\" multivitamin.      "

## 2021-06-12 LAB
SARS-COV-2 PCR COMMENT: NORMAL
SARS-COV-2 RNA SPEC QL NAA+PROBE: NEGATIVE
SARS-COV-2 VIRUS SPECIMEN SOURCE: NORMAL

## 2021-06-12 NOTE — PROGRESS NOTES
"Nick Coley is a 60 y.o. male who is being evaluated via a billable video visit.      The patient has been notified of following:     \"This video visit will be conducted via a call between you and your physician/provider. We have found that certain health care needs can be provided without the need for an in-person physical exam.  This service lets us provide the care you need with a video conversation.  If a prescription is necessary we can send it directly to your pharmacy.  If lab work is needed we can place an order for that and you can then stop by our lab to have the test done at a later time.    Video visits are billed at different rates depending on your insurance coverage. Please reach out to your insurance provider with any questions.    If during the course of the call the physician/provider feels a video visit is not appropriate, you will not be charged for this service.\"    Patient has given verbal consent to a Video visit? Yes    Video-Visit Details    Type of service:  Video Visit    Video End Time (time video stopped): 9:43 AM  Originating Location (pt. Location): Home    Distant Location (provider location):  [facility]     Platform used for Video Visit: [email or web address]      Bunny Pablo, Ph.D,    Health and Behavior Assessment with Intervention, Follow up (60 minutes): Met with patient 1:1 to review support system, psychological testing and mindful eating strategies.  Nick has made some changes in his eating and lifestyle but still needs to be more mindful about his eating.  He will follow-up with mindful eating strategies and still needs to complete the MMPI-2-RF.  Diagnoses: F 50.9; E 66.01    "

## 2021-06-12 NOTE — PROGRESS NOTES
"Nick Coley is a 60 y.o. male who is being evaluated via a billable telephone visit.      The patient has been notified of following:     \"This telephone visit will be conducted via a call between you and your physician/provider. We have found that certain health care needs can be provided without the need for a physical exam.  This service lets us provide the care you need with a short phone conversation.  If a prescription is necessary we can send it directly to your pharmacy.  If lab work is needed we can place an order for that and you can then stop by our lab to have the test done at a later time.    If during the course of the call the physician/provider feels a telephone visit is not appropriate, you will not be charged for this service.\"     Nick Coley complains of  No chief complaint on file.      I have reviewed and updated the patient's Past Medical History, Social History, Family History and Medication List.    ALLERGIES  Adhesive tape-silicones and Other allergy (see comments)    Additional provider notes:     Follow Up Surgical Weight Loss Supervised Diet Evaluation    Assessment:  Pt. is being seen today for a follow up RD nutritional evaluation. Pt. has been unsuccessful with non-surgical weight loss methods and is interested in bariatric surgery. Today we reviewed current eating habits and level of physical activity, and instructed on the changes that are required for successful bariatric outcomes.    Surgery of interest per pt: LSG.    Workflow review:  Support Group: Not completed. (conflicted with work scheduled this past month)   Psychology:In progress.  Lab work:Completed.  SWL:Yes 3rd Visit   Sleep Study also scheduled for this week.      Weight goal: 6 lbs .    Patient Active Problem List:  Patient Active Problem List   Diagnosis     Essential hypertension     Abnormal LFTs (liver function tests)     Morbid obesity (H)     LBP (low back pain)     Hyperlipidemia     Nephrolithiasis     " Irritable bowel syndrome     Microscopic hematuria     Suspected sleep apnea     Vitamin D deficiency     Moderate major depression (H)     Chronic tension-type headache, not intractable     History of colon polyps     Cellulitis of left lower extremity       Pt's No data recorded  BMI: There is no height or weight on file to calculate BMI.  Initial Weight: 351.9 lbs   Current Weight: 339 lbs (per home scale)   Estimated RMR (Clyde-St Jeor equation):     2329 kcals x 1.2 (sedentary) = 2795 kcals (for weight maintenance)        Progress over past month: has been utilizing Protein Shakes and Protein Bars as options for meal replacements, smaller portion sizes as well, has been really focusing more so on high protein with increased vegetables at meals.  Patient reports that he is trying to utilize protein based foods as snacks when physically hungry (almonds).      Diabetic: No  HbA1c:    Hemoglobin A1c   Date/Time Value Ref Range Status   09/25/2020 04:35 PM 5.6 <=5.6 % Final     Comment:     Normal <5.7% Prediabete 5.7-6.4% Diabletes 6.5% or higher - adopted from ADA consensus guidelines     Diet Recall/Time: (does work shift work)   Breakfast: Oatmeal with raisins or turkey sausage and eggs with whole wheat toast with PB   Am Snack: none   Lunch: Protein Bar (21 g), Fruit or Protein Shake (30 g)  Pm snack: almonds   Dinner: chicken or fish, salad or vegetables   HS Snack: none     Per Diet recall estimated protein: 60-80 grams    Meal duration: 30 minutes.     Beverages (Type/Oz. per day)  Water: at least 64 oz/day      fluids by 30 minutes before, during meal, and waiting 30 minutes after meal before drinking fluids: Yes-has been consistently practicing     Exercise  Type: some Biking at work, however not as much as he should    PES statement:   1.Overweight/Obesity (NC 3.3) related to overeating and poor lifestyle habits as evidenced by patient's subjective statements (excessive energy intake,  inadequate protein intake, portion control) and BMI of 52.4 kg/m2.     Intervention    Nutrition Education:   1. Provided general overview of diet and lifestyle modifications needed to be a deemed a safe candidate for bariatric surgery.     Food/Nutrient Delivery:  2. Educated pt on eating three meals, with cutting out snacking.  3. Bariatric Plate: Pt and I discussed the importance of including a lean protein source (20-30 grams/meal), vegetables (included at lunch and dinner), one serving (15g) of carbohydrate, and limited added fat (1 tb/day) at each meal.   4. Educated pt on using a protein powder drink as a meal replacement and/or supplement after bariatric surgery.   5. Discussed importance of adequate hydration after surgery, with goal of at least 64 oz of fluids/day.  6. Addressed avoiding all carbonated, caffeinated and sweetened drinks to prepare for bariatric surgery.     Nutrition Counselin. Mindful eating techniques: Encouraged slow meal pace, chewing foods to applesauce consistency for 20-30 minutes/meal.   8. Discussed  fluids 30 minutes before, during, and after meal to prevent dumping syndrome and discomfort post bariatric surgery.     Instructions/Goals:     1. Continue to work on keeping beverages separate from meals (30 minute rule).     Handouts Provided:   Pt. [facility] Patient Handbook      Monitor/Evaluation:  Pt. s target weight:  weight loss to 6 lbs from initial weight, pt. verbalized understanding.       Plan for next visit:   Educate on dumping syndrome and reading food labels.  (Final Supervised Diet visit with RD) pre/post-op  diet progression, give review of surgery process.  Review Lydia to Bariatric Success    Assessment/Plan:  1. Obesity, morbid, BMI 50 or higher (H)      2. Hypertension, unspecified type      3. Mixed hyperlipidemia      4. Nutritional counseling        Phone call duration: 30 minutes    Zaina Cerda RD

## 2021-06-12 NOTE — PROGRESS NOTES
"Nick Coley is a 60 y.o. male who is being evaluated via a billable telephone visit.      The patient has been notified of following:     \"This telephone visit will be conducted via a call between you and your physician/provider. We have found that certain health care needs can be provided without the need for a physical exam.  This service lets us provide the care you need with a short phone conversation.  If a prescription is necessary we can send it directly to your pharmacy.  If lab work is needed we can place an order for that and you can then stop by our lab to have the test done at a later time.    If during the course of the call the physician/provider feels a telephone visit is not appropriate, you will not be charged for this service.\"     Nick Coley complains of  No chief complaint on file.      I have reviewed and updated the patient's Past Medical History, Social History, Family History and Medication List.    ALLERGIES  Adhesive tape-silicones and Other allergy (see comments)    Additional provider notes:     Follow Up Surgical Weight Loss Supervised Diet Evaluation    Assessment:  Pt. is being seen today for a follow up RD nutritional evaluation. Pt. has been unsuccessful with non-surgical weight loss methods and is interested in bariatric surgery. Today we reviewed current eating habits and level of physical activity, and instructed on the changes that are required for successful bariatric outcomes.    Surgery of interest per pt: LSG.    Workflow review:  Support Group: Not completed. (struggles since it conflicts with work)   Psychology:In progress.  Lab work:Completed.  SWL:Yes 4th Visit   Sleep Study-MD ordered home kit, awaiting a call back to get scheduled    Weight goal: 6 lbs .    Anthropometrics:  Initial Weight: 351.9 lbs   Pt's Weight: (!) 342 lb (155.1 kg)    BMI: There is no height or weight on file to calculate BMI.   Ideal body weight: 67.2 kg (148 lb 4.1 oz)  Adjusted ideal body " weight: 102.4 kg (225 lb 12.1 oz)  Estimated RMR (Huntington-St Jeor equation):  2343 kcals x 1.3 (light active) = 3046 kcals (for weight maintenance)    Recommended Protein Intake: 60-80 grams of protein/day  Medical History:  Patient Active Problem List   Diagnosis     Essential hypertension     Abnormal LFTs (liver function tests)     Morbid obesity (H)     LBP (low back pain)     Hyperlipidemia     Nephrolithiasis     Irritable bowel syndrome     Microscopic hematuria     Suspected sleep apnea     Vitamin D deficiency     Moderate major depression (H)     Chronic tension-type headache, not intractable     History of colon polyps      Diabetes: no   HbA1c:    Hemoglobin A1c   Date/Time Value Ref Range Status   09/25/2020 04:35 PM 5.6 <=5.6 % Final     Comment:     Normal <5.7% Prediabete 5.7-6.4% Diabletes 6.5% or higher - adopted from ADA consensus guidelines     Progress over past month: going ok this past month, continues to focus on protein first (I.e. eggs, yogurt, chicken, fish, protein shake/bar, etc...).  Continues to work on eating slowly along with chewing food well.       Diet Recall/Time (does work shift work)  Breakfast: Protein Shake (30 g) or Oatmeal with raisins, fruit   Lunch: shaved turkey or chicken, fruit or tuna in a pouch, Protein Bar (21 g)   Dinner: shaved turkey or chicken, salad, tomatoes, sunflower seeds or chicken with green beans  Typical Snacks: raisins, almonds, sunflower seeds, cheese stick, granola bar   Meal duration: 30 minutes.    fluids by 30 minutes before, during meal, and waiting 30 minutes after meal before drinking fluids: Yes  Beverages  Water at least 64 oz/day     Exercise  Some biking at work (2 time/night vs 1 time/night)     PES statement:   Overweight/Obesity (NC 3.3) related to overeating and poor lifestyle habits as evidenced by patient's subjective statements (excessive energy intake, inadequate protein intake, portion control) and BMI of 52.8 kg/m2.       Intervention    Nutrition Education:   1. Provided general overview of diet and lifestyle modifications needed to be a deemed a safe candidate for bariatric surgery.     Food/Nutrient Delivery:  2. Educated patient on eating three meals, with cutting out snacking.  3. Bariatric Plate: Patient and I discussed the importance of including a lean protein source (20-30 grams/meal), vegetables (included at lunch and dinner), one serving (15g) of carbohydrate, and limited added fat (1 tb/day) at each meal.   4. Educated patient on using a protein powder drink as a meal replacement and/or supplement after bariatric surgery.   5. Discussed importance of adequate hydration after surgery, with goal of at least 64 oz of fluids/day.  6. Addressed avoiding all carbonated, caffeinated and sweetened drinks to prepare for bariatric surgery.     Nutrition Counselin. Mindful eating techniques: Encouraged slow meal pace, chewing foods to applesauce consistency for 20-30 minutes/meal.   8. Discussed  fluids 30 minutes before, during, and after meal to prevent dumping syndrome and discomfort post bariatric surgery.     Instructions/Goals:     1. Continue implementing bariatric surgery lifestyle modifications.  2.   Increase exercise throughout the week.       Handouts Provided:   [facility] Weight Management Patient Handbook    Monitor/Evaluation:  Pt. s target weight:  weight loss to 6 lbs, pt. verbalized understanding.       Plan for next visit:   Educate on dumping syndrome and reading food labels.  (Final Supervised Diet visit with RD) pre/post-op  diet progression, give review of surgery process.    Assessment/Plan:  1. Hypertension, unspecified type      2. Mixed hyperlipidemia      3. Morbid obesity (H)      4. Nutritional counseling        Phone call duration: 30 minutes    Zaina Cerda RD

## 2021-06-13 ENCOUNTER — COMMUNICATION - HEALTHEAST (OUTPATIENT)
Dept: SCHEDULING | Facility: CLINIC | Age: 61
End: 2021-06-13

## 2021-06-13 NOTE — PROGRESS NOTES
"Nick Coley is a 60 y.o. male who is being evaluated via a billable video visit.      The patient has been notified of following:     \"This video visit will be conducted via a call between you and your physician/provider. We have found that certain health care needs can be provided without the need for an in-person physical exam.  This service lets us provide the care you need with a video conversation.  If a prescription is necessary we can send it directly to your pharmacy.  If lab work is needed we can place an order for that and you can then stop by our lab to have the test done at a later time.    Video visits are billed at different rates depending on your insurance coverage. Please reach out to your insurance provider with any questions.    If during the course of the call the physician/provider feels a video visit is not appropriate, you will not be charged for this service.\"    Patient has given verbal consent to a Video visit? Yes    Video-Visit Details    Type of service:  Video Visit    Video End Time (time video stopped): 11:22 AM  Originating Location (pt. Location): Home    Distant Location (provider location):  [facility]     Platform used for Video Visit: [email or web address]      Bunny Pablo, Ph.D,      Health and Behavior Assessment with Intervention for  Bariatric Surgery Candidates    Name: Nick Coley   YOB: 1960  Dates of Service: 10/1/2020, 10/29/2020, 11/11/2020  Psychological Testing: 10/29/2020  Report Date: 11/11/2020    Height: 5 feet 7-1/2 inches reported Weight: 351-3/4 pounds  BMI: 54.30  Anticipated Weight: 250 pounds    Identifying Data: This is a 60 y.o.  father of 5 children (ages 35, 31, 30, 21 and 18). He was referred by [facility] to determine readiness for bariatric surgery from a psychosocial perspective. He learned about the surgery by attending the " "informational meeting and seeing his sister go through the process.    Reason for Pursuing Surgery: He would like to follow through with bariatric surgery for health reasons.  He noted \"I want to be healthy for my family and to make me feel better.\"  He also wants to live a longer life.    Diagnostic Impressions:  Principal Diagnosis: F 50.9 unspecified feeding and eating disorder  Secondary diagnoses: Morbid obesity, high cholesterol, high blood pressure, shortness of breath, history of irritable bowel syndrome and pain in his back    Conclusions    Recommendations: Based on the information gathered from this evaluation, this patient is now ready to follow through with bariatric surgery as soon as possible. The final decision to follow through with bariatric surgery should be done in collaboration with [facility] clinical staff.    Current Treatment Plan and Aftercare Plan: This patient agrees to continue to prepare for surgery and to participate in aftercare as directed by [facility] clinical staff. This includes following up with this clinician 3-6 months post-operatively, attending the [facility] support group meeting and continuing to make the lifestyle and eating changes such as documenting his eating, measuring his food, planning out his meals and increasing activity level.    Special Needs or Precautions: Monitor this patient's ability to avoid mindless eating.    Compliance, Motivation and Expectations (Change in Behavior): This patient has made significant changes in his eating and lifestyle. He is highly motivated to continue to make these changes post-operatively. He has realistic expectations about the surgery.     Self-Perception of Readiness for Surgery: This patient rated her readiness for surgery at a 8, where 10 means \"extremely well prepared.\"    The following history supports the above conclusions and " treatment recommendations.    History of Presenting Illness: This patient has struggled with his weight since 1989.  He reached 200 pounds in the early 1990s.  He was 300 pounds in 2000.  His highest weight was 360 pounds. He believes morbid obesity has affected his daily life through feeling self-conscious, having difficulty getting in and out of a chair, getting up from the floor, sitting in a gudino at a restaurant, sitting in an airplane seat, getting in and out of a small car, putting on a seatbelt as well as low endurance and shortness of breath.    Previous Attempts at Disease Management: This patient has never been through structured weight loss and at most lost 25 pounds.  He believes he gained weight over time because after getting  he would drink and eat more.    Self-Care Behaviors  Day and Night Routine: This patient goes to sleep between 10:30 PM and 12 AM and wakes up between 430 and 9:30 AM.  His work hours are either between 2 and 10 PM or 6 AM and 2 PM as a  for the [place] [employer].  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    Boundaries and Limit Setting: This patient does engage in some caretaking behaviors but is able to say no to others.    Self-Care and Treatment Adherence: This patient believes he could do a better job of taking care of himself.  His hygiene is good, he complies with medical advice given to him and gets regular physical exams.  His last dental exam was 2 years ago.    Stress Management and Coping Mechanisms: This patient fernanda with stress by yelling at times.    Other Impulsive and Compulsive Tendencies  Gambling: Patient gambles $100 once every 4 months.  Compulsive Spending: Denied  Credit Card Debt: Minimal  Bankruptcy: Denied    Legal History: Denied    Physical and Leisure Activities: This patient plays softball, shovels and walks.  He also uses a 3 wheeled bike at work.    Substance Use  OTC and  Prescription Medications: This patient denied a history of medication abuse and generally takes his medications as directed although has forgotten to take some of the medications in the past.  Nicotine: This patient started smoking cigarettes as a teenager and has not done so since then.  Alcohol: This patient started treating alcohol at age 18, rarely drinks now and denied a history of alcohol-related problems.  He understands increased risk of intoxication following a bariatric surgical procedure.  Illicit Substances: This patient tried cannabis in his early 20s, did so irregularly and has not done so since then.    Typical Eating Pattern and Fluid Intake  Eating Disorder-Related Behavior: This patient denied a history of misusing diuretics or laxatives, of making himself vomit to lose weight, of starving himself, of over-exercising, of taking illegal substances or of smoking cigarettes for weight control purposes.  He has a history of overeating.  Historically he tended to eat his first meal at 10:30 AM consisting of cereal.  He skipped it at times.  Later he would snack on whole-grain crackers, peanut butter, sunflower seed, raisins and granola bars.  Dinner was between 6 and 7 PM consisting of leftovers, hot dogs and macaroni and cheese as well as spaghetti, hamburger rice-a-Eduin and chicken.  Later he would have ice cream and chips.  He was having 1 to 2 gallons of water and Powerade 0 on a daily basis.    Since starting the health and behavior assessment he was eating breakfast between 915 and 11 AM consisting of scrambled eggs, fruit, peanut butter toast, protein shake and oatmeal.  Lunch was between 330 and 4 PM consisting of turkey, fruit and protein bar.  He would also have tuna and chicken.  Later he was having almonds, raisins and grapes.  Dinner was between 830 and 9 PM consisting of chicken, green beans and salad.  He was having 1 to 2 gallons of water and a minimal amount of Powerade 0 on a daily  "basis.  He tended to lose control of his eating when he was on his own at night and his comfort food included chips.    Psychiatric History  Traumatic Life Events and Abuse/Neglect History: The patient denied a history of trauma as well as physical, emotional or sexual abuse.  With reference to self-esteem he noted that he is \"somebody who let himself go.\"  He was put down and teased because of his physical condition in the past.  He noted a history of depression after he realized the weight was not coming off.  He denied suicidal ideation.  He still experiences some sadness at times but denied any significant depressive symptoms now.  He denied anxiety, panic or obsessive-compulsive symptoms.  He denied being on a psychotropic medication trial.  He was in psychotherapy between June 2019 and February 2020 at [facility].  He also was working on family related issues as well as work stress.    Medical History (by patient report and without consulting with his primary care physician). This patient is followed medically by Long Weston MD at [facility] in [place] who reportedly supports the patient's candidacy for bariatric surgery.    Allergies/Sensitivities: Adhesive tape and silk tape  Prenatal Complications, Birth Trauma, Unusual Birth Weight: Denied  Head Trauma, Concussion, Extremely High Fevers, Seizures: Major concussion and minor concussion  Thyroid Function: Reportedly normal.  Hospitalizations and Surgeries: Review medical record  Current Medications:   Current Outpatient Medications:      aspirin 81 MG EC tablet, Take 1 tablet (81 mg total) by mouth daily., Disp: 90 tablet, Rfl: 3     cholecalciferol, vitamin D3, 5,000 unit Tab, Take by mouth daily., Disp: , Rfl:      citalopram (CELEXA) 20 MG tablet, Take 1 tablet (20 mg total) by mouth daily., Disp: 90 tablet, Rfl: 3     lisinopriL-hydrochlorothiazide (ZESTORETIC) 20-25 mg per tablet, Take 1 tablet by mouth daily., Disp: 90 tablet, Rfl: " "3     rosuvastatin (CRESTOR) 10 MG tablet, Take 1 tablet (10 mg total) by mouth daily., Disp: 90 tablet, Rfl: 3  Vitamins/Supplements: Vitamin D  Activities of Daily Living (ADLs): This patient has difficulty tying his shoes.  Attitudes, Fears, or Concerns Regarding this Surgery: This patient is somewhat concerned about complications and whether or not it will work.  He understands the risks.    Family History  This patient was adopted so he has no understanding of the family history of various comorbidities.    Social and Developmental History:  This patient was born and raised in [place], [place].  His adopted mother  2 years ago at age 92 and his adoptive father  8 to 9 years ago at age 89.  He has an adopted brother and a sister who is biological to his adoptive parents.  During his upbringing \"my brother was in trouble all the time.\"  This patient was involved in a lot of sports.  He stated that his father was an alcoholic.    Educational History: This patient graduated from [employer] in [place] high school in .  Employment: This patient is a  at the [place] [employer], has been doing so since  and loves the job.  Current Living Situation, Partner, and Children: This patient has been  since .  He has 3 sons (35, 31 and 21) and 2 daughters (30 and 18).  He also has custody of his 6-year-old granddaughter.  He lives with his wife, granddaughter and 2 youngest children.  His support includes his wife and sister.  Jainism Orientation/Spiritual Support: Denied   History: Denied  Two Year Goals: This patient would like to still have a job, have improved health and be more active.    Psychological Testing: The Alcohol Use Disorders Identification Test (AUDIT) is a measure to determine how alcohol affects overall functioning and treatment. His score was 3 which is at a subclinical level suggesting that alcohol likely will not affect his functioning in the least.    This " patient scored a 1 on the Adverse Childhood Experience (ACE) Questionnaire suggesting that he did not do with abuse but his father was an alcoholic.    This patient scored at a clinically significant level for weight concerns on the Eating Disorders Examination Questionnaire.    This patient did not score at a clinically significant level for night eating on the Night Eating Questionnaire or Binge Eating on the Binge Eating Scale.    This patient scored at a clinically significant level for physical function and public distress on the Impact of Weight on Quality of Life Questionnaire-Lite Version.    The [place] Multiphasic Personality Enxqmhmqv-1-Ldylkcvgmiic Form (MMPI-2-RF)  could not be completed as there were technical issues.  Thus no further interpretation can be done at this time.    Mental Status: This patient came to the evaluation setting dressed casually, although appropriately. He was generally cooperative and responded appropriately to this clinician's questions. His affect was generally bright and His mood was consistent with his affect. There is no evidence of obsessions, compulsions, suicidal ideation or homicidal ideation. There is no evidence of hallucinations, delusions, paranoid ideation, grossly inappropriate affect or other cat manifestation of psychotic disorder. He was oriented to person, place and time, and there is no evidence of any problems with impulse control.

## 2021-06-13 NOTE — PATIENT INSTRUCTIONS - HE
1. Suspect you are trying to pass kidney stone    -will follow up on urine test to see if there is any associated kidney infection    -drink plenty of fluid    -have CT scan done    -Take Flomax: this medication can help pass stone    Schedule appointment with your urologist    Also follow up with kidney stone clinic and have 24 hour urine test done to see if any preventative medication can be given.

## 2021-06-13 NOTE — PROGRESS NOTES
"Nick Coley is a 60 y.o. male who is being evaluated via a billable telephone visit.      The patient has been notified of following:     \"This telephone visit will be conducted via a call between you and your physician/provider. We have found that certain health care needs can be provided without the need for a physical exam.  This service lets us provide the care you need with a short phone conversation.  If a prescription is necessary we can send it directly to your pharmacy.  If lab work is needed we can place an order for that and you can then stop by our lab to have the test done at a later time.    If during the course of the call the physician/provider feels a telephone visit is not appropriate, you will not be charged for this service.\"     Nick Coley complains of  No chief complaint on file.      I have reviewed and updated the patient's Past Medical History, Social History, Family History and Medication List.    ALLERGIES  Adhesive tape-silicones and Other allergy (see comments)    Additional provider notes:     Follow Up Surgical Weight Loss Supervised Diet Evaluation    Assessment:  Pt. is being seen today for a follow up RD nutritional evaluation. Pt. has been unsuccessful with non-surgical weight loss methods and is interested in bariatric surgery. Today we reviewed current eating habits and level of physical activity, and instructed on the changes that are required for successful bariatric outcomes.    Surgery of interest per pt: LSG.    Workflow review:  Support Group: Not completed. (hoping to go this month)   Psychology:Completed.  Lab work:Completed.  SWL:Yes 5th Visit   Sleep Study-Awaiting kit to arrive in the mail (notes that it will take up to 3-4 weeks), then awaiting a call back to get scheduled    Weight goal: 6 lbs .    Anthropometrics:  Pt's Weight: (!) 342 lb (155.1 kg)    BMI: There is no height or weight on file to calculate BMI.   Patient weight not recorded  Estimated RMR " ([place]-St Jeor equation):  2343 kcals x 1.3 (light active) = 3046 kcals (for weight maintenance)    Medical History:  Patient Active Problem List   Diagnosis     Essential hypertension     Abnormal LFTs (liver function tests)     Morbid obesity (H)     LBP (low back pain)     Hyperlipidemia     Nephrolithiasis     Irritable bowel syndrome     Microscopic hematuria     Suspected sleep apnea     Vitamin D deficiency     Moderate major depression (H)     Chronic tension-type headache, not intractable     History of colon polyps      Diabetes: No   HbA1c:    Hemoglobin A1c   Date/Time Value Ref Range Status   09/25/2020 04:35 PM 5.6 <=5.6 % Final     Comment:     Normal <5.7% Prediabete 5.7-6.4% Diabletes 6.5% or higher - adopted from ADA consensus guidelines     Progress over past month: Patient reports that he continues to focus on protein first and foremost at each meal.  Patient also reports that he continues to work on incorporating more vegetables into his diet as well.      Meal duration: 30 minutes.    fluids by 30 minutes before, during meal, and waiting 30 minutes after meal before drinking fluids: Yes  Beverages  Water-at least 64 oz/day    Exercise  Biking at work (increased more recently) at least 4 days/week 1-2 miles (on average)     PES statement:   Overweight/Obesity (NC 3.3) related to overeating and poor lifestyle habits as evidenced by patient's subjective statements (excessive energy intake, inadequate protein intake, portion control) and BMI of 52.8 kg/m2.       Intervention    Nutrition Education:   1. Provided general overview of diet and lifestyle modifications needed to be a deemed a safe candidate for bariatric surgery.   2. Educated patient on how to read a food label: choosing foods with than 10 grams fat and 10 grams sugar per serving to avoid dumping syndrome.  3. Dumping Syndrome: Described the mechanisms of syndrome, symptoms, and prevention tools from a dietary perspective.    4. Vitamins: Educated on post-op vitamin regimen including MVI+ 18 mg Fe two times a day, calcium citrate 400-600 mg two times a day, 1805-5666 mcg sublingual B12 daily, 5000 IU vitamin D3 daily.     Food/Nutrient Delivery:  5. Educated patient on eating three meals, with cutting out snacking.  6. Bariatric Plate: Patient and I discussed the importance of including a lean protein source (20-30 grams/meal), vegetables (included at lunch and dinner), one serving (15g) of carbohydrate, and limited added fat (1 tb/day) at each meal.   7. Educated patient on using a protein powder drink as a meal replacement and/or supplement after bariatric surgery.   8. Discussed importance of adequate hydration after surgery, with goal of at least 64 oz of fluids/day.  9. Addressed avoiding all carbonated, caffeinated and sweetened drinks to prepare for bariatric surgery.     Nutrition Counseling:  10. Mindful eating techniques: Encouraged slow meal pace, chewing foods to applesauce consistency for 20-30 minutes/meal.   11. Discussed  fluids 30 minutes before, during, and after meal to prevent dumping syndrome and discomfort post bariatric surgery.   12. Discussed pre/post operative diet progression, post op vitamin regimen, gave review of surgery process.     Instructions/Goals:     1. Continue implementing bariatric surgery lifestyle modifications.    Handouts Provided:   [facility] Weight Management Patient Handbook      Monitor/Evaluation:  Pt. s target weight:  weight loss to 6 lbs from initial visit, pt. verbalized understanding.     Pt has completed all nutrition requirements and is well-informed of the dietary and physical activity requirements that are necessary for successful bariatric outcomes. This pt is an appropriate candidate for surgery from a nutrition standpoint at this time. The patient understands that surgery is a tool, not a cure, and post operative follow up is essential.  Since patient has  not had his sleep study yet, this writer will continue to check in with him monthly.     Plan for next visit:   Check In    Assessment/Plan:  1. Essential hypertension      2. Mixed hyperlipidemia      3. Morbid obesity (H)      4. Nutritional counseling        Phone call duration: 30 minutes    Zaina Cerda, RD

## 2021-06-13 NOTE — TELEPHONE ENCOUNTER
New Appointment Needed  What is the reason for the visit:    Same Date/Next Day Appt Request  What is the reason for your visit?: poss kidney infection    Provider Preference: Any available  How soon do you need to be seen?: today  Waitlist offered?: No  Okay to leave a detailed message:  Yes

## 2021-06-13 NOTE — TELEPHONE ENCOUNTER
Spoke with patient today and results of CT scan.  Unfortunately it shows a very large staghorn calculus in his right kidney, 4.2 cm in largest diameter (over an inch).  No passing stones in the ureters and no hydronephrosis.  Small stone on the left kidney is unchanged.  Previous blood work showed normal calcium, PTH and vitamin D levels.  Urinalysis looked inflamed and we will follow up on urine culture.?  If mild chronic infection could have caused the stone.  Patient is not having any fevers or chills.    Call him with results of urine culture.    I recommended urology evaluation as soon as possible.  I will put a referral in, he has not seen urologist in a while.    To [place], if you have a specific person and urology would like patient to see that would be helpful since his case is pretty complex.  I do not know urology specialist personally.

## 2021-06-13 NOTE — PROGRESS NOTES
[facility] Follow Up Note    Assessment/Plan:    1. Flank pain  Clinically consistent with recurrent kidney stones.  Patient has had this discomfort for 3 weeks now.  Currently no fevers or chills but urine does have bacteria.  We will follow-up on urine culture to see if he might need antibiotics.  I did recommend that he drinks plenty of fluid and prescribe him Flomax.  For pain recommended NSAIDs over-the-counter like naproxen.  We will go ahead and do a CT scan and see where the stones are.  I recommended that he contacts his urologist and makes an appointment.  - Urinalysis-UC if Indicated  - Culture, Urine  - Basic Metabolic Panel  - HM1(CBC and Differential)  - CT Abdomen Pelvis Without Oral Without IV Contrast; Future  - tamsulosin (FLOMAX) 0.4 mg cap; Take 1 capsule (0.4 mg total) by mouth daily.  Dispense: 30 capsule; Refill: 0    2. Nephrolithiasis  Recurrent kidney stones.  He will need 24-hour urine collection in the future and I recommended that he follows up with us kidney stone clinic for preventative measures.  - Basic Metabolic Panel  - HM1(CBC and Differential)  - CT Abdomen Pelvis Without Oral Without IV Contrast; Future  - tamsulosin (FLOMAX) 0.4 mg cap; Take 1 capsule (0.4 mg total) by mouth daily.  Dispense: 30 capsule; Refill: 0    3. Morbid obesity (H)  He is getting ready for gastric bypass surgery and is monitored by weight loss clinic.  Of note recent blood work showed normal calcium, PTH and vitamin D levels.  He is currently on a multivitamin.      Becky Benedict MD    Chief Complaint:  Chief Complaint   Patient presents with     Pain     right side from front to back, about 3 weeks       History of Present Illness:  Nick is a 60 y.o. male has history of morbid obesity, depression, high blood pressure, hyperlipidemia, fatty liver, kidney stones who is currently here for acute visit due to right flank pain radiating into his right groin.    Patient reports that symptoms  started 3 weeks ago and has been intermittent but currently has become more frequent.  He usually experiences more pain when he is lying down and not sitting up.  He has pain in his right flank and it radiates into his right groin.  In the past he has had kidney stones but he said that was on the left side.  CT scan from 2009 was reviewed which showed right ureteral stones as well as 1 stone in each right and left kidneys which were not trying to pass.  Patient reports that in the past he is always required instrumentation to remove stones.  He has never had 24-hour urine collection done.  Current UA is positive for red cells and inflammation.  Patient denies any fevers or chills.  We will follow-up on urine culture to see if he needs an antibiotic.  He denies any nausea or vomiting.    Of note due to his morbid obesity he is currently in the weight loss clinic and getting ready for gastric bypass surgery.  He had an extensive blood work done in October which showed normal calcium level, parathyroid hormone level and vitamin D levels.  She does report that he started taking multivitamin 2 months ago.    He also had colonoscopy in 2018 which showed hemorrhoids, 5 polyps which were removed and sigmoid diverticulosis.    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 90 tablet 3     cholecalciferol, vitamin D3, 5,000 unit Tab Take by mouth daily.       citalopram (CELEXA) 20 MG tablet Take 1 tablet (20 mg total) by mouth daily. 90 tablet 3      lisinopriL-hydrochlorothiazide (ZESTORETIC) 20-25 mg per tablet Take 1 tablet by mouth daily. 90 tablet 3     rosuvastatin (CRESTOR) 10 MG tablet Take 1 tablet (10 mg total) by mouth daily. 90 tablet 3     tamsulosin (FLOMAX) 0.4 mg cap Take 1 capsule (0.4 mg total) by mouth daily. 30 capsule 0     No current facility-administered medications for this visit.        Family History: Reviewed    Physical Exam:    Vitals:    12/03/20 1209   BP: 122/70   Pulse: 68   SpO2: 94%   Weight: (!) 341 lb (154.7 kg)     Wt Readings from Last 3 Encounters:   12/03/20 (!) 341 lb (154.7 kg)   11/05/20 (!) 342 lb (155.1 kg)   09/25/20 (!) 351 lb 14.4 oz (159.6 kg)     Body mass index is 52.62 kg/m .    Constitutional:  Reveals a pleasant male, in no acute distress.  Vitals:  Per nursing notes.  HEENT:No cervical LAD, no thyromegaly,  conjunctiva is pink, no scleral icterus, TMs are visualized and normal bl, oropharynx is clear, no exudates,   Cardiac:  Regular rate and rhythm,no murmurs, rubs, or gallops. Legs with mild edema chronic stasis skin changes. Palpation of the radial pulse regular.  Lungs: Clear to auscultation bl.  Respiratory effort normal.  Abdomen:positive BS, soft, nondistended.  He does have mild right CVA tenderness on palpation and pain in the right lower quadrant which does radiate laterally and to the flank.  Skin:   Without rash, bruise, or palpable lesions.  Rheumatologic: Normal joints and nails of the hands.  Neurologic:  Cranial nerves II-XII intact.     Psychiatric: affect appropriate, memory intact.     Data Review:    Analysis and Summary of Old Records (2): yes      Records Requested (1): no      Other History Summarized (from other people in the room) (2): no    Radiology Tests Summarized (XRAY/CT/MRI/DXA) (1): CT    Labs Reviewed (1): yes    Medicine Tests Reviewed (EKG/ECHO/COLONOSCOPY/EGD) (1): colo    Independent Review of EKG or X-RAY (2): no

## 2021-06-14 ENCOUNTER — ANESTHESIA - HEALTHEAST (OUTPATIENT)
Dept: SURGERY | Facility: HOSPITAL | Age: 61
End: 2021-06-14

## 2021-06-14 NOTE — TELEPHONE ENCOUNTER
I understand that he was diagnosed with a kidney stone in early December.  He was to schedule an appointment with urology.  Has that happened?

## 2021-06-14 NOTE — TELEPHONE ENCOUNTER
Spoke with patient, he did not seem as concerned as his wife.  He states he has to do a sleep study prior to surgery.  He has been contacted and will be doing that in the next week.  Then surgery will be scheduled.  Yenni MONTOYA CMA

## 2021-06-14 NOTE — TELEPHONE ENCOUNTER
Reason for Call:  Pain regarding kidney stone    Detailed comments: Wife is wondering what next steps are regarding patient's kidney stone, he is currently in pain. Would like to know status of surgery.     Phone Number Patient can be reached at: Home number on file [phone] (home)    Best Time: anytime     Can we leave a detailed message on this number?: Yes    Call taken on 12/28/2020 at 8:47 AM by Karyn Villavicencio

## 2021-06-14 NOTE — PROGRESS NOTES
"Nick Coley is a 61 y.o. male who is being evaluated via a billable telephone visit.      The patient has been notified of following:     \"This telephone visit will be conducted via a call between you and your physician/provider. We have found that certain health care needs can be provided without the need for a physical exam.  This service lets us provide the care you need with a short phone conversation.  If a prescription is necessary we can send it directly to your pharmacy.  If lab work is needed we can place an order for that and you can then stop by our lab to have the test done at a later time.    If during the course of the call the physician/provider feels a telephone visit is not appropriate, you will not be charged for this service.\"     Nick Coley complains of  No chief complaint on file.      I have reviewed and updated the patient's Past Medical History, Social History, Family History and Medication List.    ALLERGIES  Adhesive tape-silicones and Other allergy (see comments)    Additional provider notes:     Follow Up Surgical Weight Loss Supervised Diet Evaluation    Assessment:  Pt. is being seen today for a follow up RD nutritional evaluation. Pt. has been unsuccessful with non-surgical weight loss methods and is interested in bariatric surgery. Today we reviewed current eating habits and level of physical activity, and instructed on the changes that are required for successful bariatric outcomes.    Surgery of interest per pt: LSG.    Workflow review:  Support Group: Not completed.  Psychology:Completed.  Lab work:Completed.  SWL:Yes 7th Visit   Sleep Study-has sleep apnea, started using a C-Pap very recently  Kidney Stone removal-surgery scheduled for March 12th    Weight goal: 6 lbs .    Anthropometrics:  Pt's No data recorded  BMI: There is no height or weight on file to calculate BMI.   Patient weight not recorded   Initial Weight: 351.9 lbs   Current Weight: 342 lbs   Estimated RMR " ([place]-St Jeor equation):  2338 kcals x 1.3 (light active) = 3039 kcals (for weight maintenance)    Medical History:  Patient Active Problem List   Diagnosis     Essential hypertension     Abnormal LFTs (liver function tests)     Morbid obesity (H)     LBP (low back pain)     Hyperlipidemia     Nephrolithiasis     Irritable bowel syndrome     Microscopic hematuria     Suspected sleep apnea     Vitamin D deficiency     Moderate major depression (H)     Chronic tension-type headache, not intractable     History of colon polyps      Diabetes: No  HbA1c:    Hemoglobin A1c   Date/Time Value Ref Range Status   09/25/2020 04:35 PM 5.6 <=5.6 % Final     Comment:     Normal <5.7% Prediabete 5.7-6.4% Diabletes 6.5% or higher - adopted from ADA consensus guidelines     Progress over past month: Patient continues to work on protein first and as the main entree at each meal to ensure adequate protein consumption.  Patient also reports that he is trying to be more conscious regarding adequate vegetable intake.  Patient reports that he is going to try and set up his exercise even more, noting that he plans on utilizing the treadmill more so at home as well as potentially utilizing his gym membership.     Meal duration: 30 minutes.    fluids by 30 minutes before, during meal, and waiting 30 minutes after meal before drinking fluids: Yes    Exercise  Biking at work on his breaks for 10-15 minutes.  Treadmill as well at home for 20 minutes or so, aiming for daily.     PES statement:   Overweight/Obesity (NC 3.3) related to overeating and poor lifestyle habits as evidenced by patient's subjective statements (excessive energy intake, portion control) and BMI of 52.9 kg/m2.       Intervention    Nutrition Education:   1. Provided general overview of diet and lifestyle modifications needed to be a deemed a safe candidate for bariatric surgery.     Food/Nutrient Delivery:  2. Educated patient on eating three meals, with  cutting out snacking.  3. Bariatric Plate: Patient and I discussed the importance of including a lean protein source (20-30 grams/meal), vegetables (included at lunch and dinner), one serving (15g) of carbohydrate, and limited added fat (1 tb/day) at each meal.   4. Discussed importance of adequate hydration after surgery, with goal of at least 64 oz of fluids/day.  5. Addressed avoiding all carbonated, caffeinated and sweetened drinks to prepare for bariatric surgery.     Nutrition Counselin. Mindful eating techniques: Encouraged slow meal pace, chewing foods to applesauce consistency for 20-30 minutes/meal.   7. Discussed  fluids 30 minutes before, during, and after meal to prevent dumping syndrome and discomfort post bariatric surgery.     Instructions/Goals:     1. Continue implementing bariatric surgery lifestyle modifications.      Handouts Provided:   [facility] Weight Management Patient Handbook    Monitor/Evaluation:  Pt. s target weight:  weight loss to 6 lbs, pt. verbalized understanding.     Pt has completed all nutrition requirements and is well-informed of the dietary and physical activity requirements that are necessary for successful bariatric outcomes. This pt is an appropriate candidate for surgery from a nutrition standpoint at this time. The patient understands that surgery is a tool, not a cure, and post operative follow up is essential. Patient will continue to see this writer on a monthly basis until surgery is scheduled.     Plan for next visit:   Check In    Assessment/Plan:  1. Hypertension, unspecified type    2. Mixed hyperlipidemia      3. Obesity, morbid, BMI 50 or higher (H)      4. Nutritional counseling        Phone call duration: 30 minutes    Zaina Cerda RD

## 2021-06-14 NOTE — PROGRESS NOTES
"Nick Coley is a 60 y.o. male who is being evaluated via a billable telephone visit.      The patient has been notified of following:     \"This telephone visit will be conducted via a call between you and your physician/provider. We have found that certain health care needs can be provided without the need for a physical exam.  This service lets us provide the care you need with a short phone conversation.  If a prescription is necessary we can send it directly to your pharmacy.  If lab work is needed we can place an order for that and you can then stop by our lab to have the test done at a later time.    If during the course of the call the physician/provider feels a telephone visit is not appropriate, you will not be charged for this service.\"     Nick Coley complains of  No chief complaint on file.      I have reviewed and updated the patient's Past Medical History, Social History, Family History and Medication List.    ALLERGIES  Adhesive tape-silicones and Other allergy (see comments)    Additional provider notes:     Follow Up Surgical Weight Loss Supervised Diet Evaluation    Assessment:  Pt. is being seen today for a follow up RD nutritional evaluation. Pt. has been unsuccessful with non-surgical weight loss methods and is interested in bariatric surgery. Today we reviewed current eating habits and level of physical activity, and instructed on the changes that are required for successful bariatric outcomes.    Surgery of interest per pt: LSG.    Workflow review:  Support Group: Not completed.-unfortunately was called to work that night, so missed it.   Psychology:Completed.  Lab work:Completed.  SWL:Yes 6th Visit  Patient reports that he found out that he has a kidney stone that needs to be surgically removed   Sleep Study-finished testing on Sunday, plan is to review the results and report back on Friday     Weight goal: 6 lbs .    Anthropometrics:  Pt's No data recorded  BMI: There is no height or " weight on file to calculate BMI.   Initial Weight: 351.9 lbs   Current Weight: 341 lbs   Patient weight not recorded  Estimated RMR (Beckham-St Jeor equation):  2238 kcals x 1.3 (light active) = 2909 kcals (for weight maintenance)    Medical History:  Patient Active Problem List   Diagnosis     Essential hypertension     Abnormal LFTs (liver function tests)     Morbid obesity (H)     LBP (low back pain)     Hyperlipidemia     Nephrolithiasis     Irritable bowel syndrome     Microscopic hematuria     Suspected sleep apnea     Vitamin D deficiency     Moderate major depression (H)     Chronic tension-type headache, not intractable     History of colon polyps      Diabetes: No   HbA1c:    Hemoglobin A1c   Date/Time Value Ref Range Status   09/25/2020 04:35 PM 5.6 <=5.6 % Final     Comment:     Normal <5.7% Prediabete 5.7-6.4% Diabletes 6.5% or higher - adopted from ADA consensus guidelines     Progress over past month: Continues to utilize the protein bars and shakes as options for meal replacements.  Patient notes that he continues to focus on protein first at meal times.  Patient reports that he has had a couple bumps in the road due to finding out that he has a kidney stone that involves surgery along with his wife being ill, but is now better.      Meal duration: 30 minutes.    fluids by 30 minutes before, during meal, and waiting 30 minutes after meal before drinking fluids: Yes    Exercise  Biking at work whenever he can, has also been doing some walking at work in addition (he notes that walking has been helping in terms of stress)     PES statement:   Overweight/Obesity (NC 3.3) related to overeating and poor lifestyle habits as evidenced by patient's subjective statements (excessive energy intake, portion control) and BMI of 52.7 kg/m2.     Intervention    Nutrition Education:   1. Provided general overview of diet and lifestyle modifications needed to be a deemed a safe candidate for bariatric  surgery.     Food/Nutrient Delivery:  2. Educated patient on eating three meals, with cutting out snacking.  3. Bariatric Plate: Patient and I discussed the importance of including a lean protein source (20-30 grams/meal), vegetables (included at lunch and dinner), one serving (15g) of carbohydrate, and limited added fat (1 tb/day) at each meal.   4. Discussed importance of adequate hydration after surgery, with goal of at least 64 oz of fluids/day.  5. Addressed avoiding all carbonated, caffeinated and sweetened drinks to prepare for bariatric surgery.     Nutrition Counselin. Mindful eating techniques: Encouraged slow meal pace, chewing foods to applesauce consistency for 20-30 minutes/meal.   7. Discussed  fluids 30 minutes before, during, and after meal to prevent dumping syndrome and discomfort post bariatric surgery.     Instructions/Goals:     1. Continue implementing bariatric surgery lifestyle modifications.      Handouts Provided:   [facility] Weight Management Patient Handbook      Monitor/Evaluation:  Pt. s target weight:  weight loss to 6 lbs from initial weight, pt. verbalized understanding.     Pt has completed all nutrition requirements and is well-informed of the dietary and physical activity requirements that are necessary for successful bariatric outcomes. This pt is an appropriate candidate for surgery from a nutrition standpoint at this time. The patient understands that surgery is a tool, not a cure, and post operative follow up is essential. Patient will continue to see this writer on a monthly basis until surgery is scheduled    Plan for next visit:   Check In    Assessment/Plan:  1. Hypertension, unspecified type      2. Mixed hyperlipidemia      3. Obesity, morbid, BMI 50 or higher (H)      4. Nutritional counseling        Phone call duration: 30 minutes    Zaina Cerda RD

## 2021-06-15 ENCOUNTER — RECORDS - HEALTHEAST (OUTPATIENT)
Dept: ADMINISTRATIVE | Facility: OTHER | Age: 61
End: 2021-06-15

## 2021-06-15 ENCOUNTER — SURGERY - HEALTHEAST (OUTPATIENT)
Dept: SURGERY | Facility: HOSPITAL | Age: 61
End: 2021-06-15
Payer: COMMERCIAL

## 2021-06-15 ASSESSMENT — MIFFLIN-ST. JEOR: SCORE: 2264.14

## 2021-06-15 NOTE — PROGRESS NOTES
"Nick Coley is a 61 y.o. male who is being evaluated via a billable telephone visit.      The patient has been notified of following:     \"This telephone visit will be conducted via a call between you and your physician/provider. We have found that certain health care needs can be provided without the need for a physical exam.  This service lets us provide the care you need with a short phone conversation.  If a prescription is necessary we can send it directly to your pharmacy.  If lab work is needed we can place an order for that and you can then stop by our lab to have the test done at a later time.    If during the course of the call the physician/provider feels a telephone visit is not appropriate, you will not be charged for this service.\"     Nick Coley complains of  No chief complaint on file.      I have reviewed and updated the patient's Past Medical History, Social History, Family History and Medication List.    ALLERGIES  Adhesive tape-silicones and Other allergy (see comments)    Additional provider notes:     Follow Up Surgical Weight Loss Supervised Diet Evaluation    Assessment:  Pt. is being seen today for a follow up RD nutritional evaluation. Pt. has been unsuccessful with non-surgical weight loss methods and is interested in bariatric surgery. Today we reviewed current eating habits and level of physical activity, and instructed on the changes that are required for successful bariatric outcomes.     Surgery of interest per pt: LSG.-Patient hoping for a surgery date in June 2021    Workflow review:  Support Group: Completed.  Psychology:Completed.  Lab work:Completed.  SWL:Yes 8th Visit   Kidney Stone removal-surgery scheduled for March 12th   C-Pap for Sleep Apnea     Weight goal: 6 lbs or more .    Anthropometrics:  Pt's Initial Weight: 351 lbs  Weight: (!) 342 lb (155.1 kg)  Weight loss from initial: 9  % Weight loss: 2.56 %  Weight (Patient Reported): 342 lb (155.1 kg)  Height (Patient " "Reported): 5' 7.5\" (1.715 m)  BMI (Based on Pt Reported Ht/Wt): 52.77    BMI: There is no height or weight on file to calculate BMI.   Patient weight not recorded  Estimated RMR (Ringwood-St Jeor equation):  2338 kcals x 1.3 (light active) = 3039 kcals (for weight maintenance)    Medical History:  Patient Active Problem List   Diagnosis     Essential hypertension     Abnormal LFTs (liver function tests)     Morbid obesity (H)     LBP (low back pain)     Hyperlipidemia     Nephrolithiasis     Irritable bowel syndrome     Microscopic hematuria     Suspected sleep apnea     Vitamin D deficiency     Moderate major depression (H)     Chronic tension-type headache, not intractable     History of colon polyps     ANAM (obstructive sleep apnea)     Abnormal liver function      Diabetes: No  HbA1c:    Hemoglobin A1c   Date/Time Value Ref Range Status   09/25/2020 04:35 PM 5.6 <=5.6 % Final     Comment:     Normal <5.7% Prediabete 5.7-6.4% Diabletes 6.5% or higher - adopted from ADA consensus guidelines     Progress over past month: Continues to work on post-op diet guidelines as well as exercise.  Patient does continues to work on protein first at each meal.  Patient did have a number of questions for this writer pertaining to protein supplements as well as options for on the go breakfast, when he is in a hurry.        Exercise  Biking/Walking at work when he can depending upon how busy he is, Treadmill at home    PES statement:   Overweight/Obesity (NC 3.3) related to overeating and poor lifestyle habits as evidenced by patient's subjective statements (excessive energy intake, portion control) and BMI of 52.9 kg/m2.    Intervention    Nutrition Education:   1. Provided general overview of diet and lifestyle modifications needed to be a deemed a safe candidate for bariatric surgery.     Food/Nutrient Delivery:  2. Educated patient on eating three meals, with cutting out snacking.  3. Bariatric Plate: Patient and I discussed " the importance of including a lean protein source (20-30 grams/meal), vegetables (included at lunch and dinner), one serving (15g) of carbohydrate, and limited added fat (1 tb/day) at each meal.   4. Educated patient on using a protein powder drink as a meal replacement and/or supplement after bariatric surgery.   5. Discussed importance of adequate hydration after surgery, with goal of at least 64 oz of fluids/day.  6. Addressed avoiding all carbonated, caffeinated and sweetened drinks to prepare for bariatric surgery.     Nutrition Counselin. Mindful eating techniques: Encouraged slow meal pace, chewing foods to applesauce consistency for 20-30 minutes/meal.   8. Discussed  fluids 30 minutes before, during, and after meal to prevent dumping syndrome and discomfort post bariatric surgery.     Instructions/Goals:     1. Continue implementing bariatric surgery lifestyle modifications.    Handouts Provided:   [facility] Weight Management Patient Handbook      Monitor/Evaluation:  Pt. s target weight:  weight loss to 6 lbs, pt. verbalized understanding.     Pt has completed all nutrition requirements and is well-informed of the dietary and physical activity requirements that are necessary for successful bariatric outcomes. This pt is an appropriate candidate for surgery from a nutrition standpoint at this time. The patient understands that surgery is a tool, not a cure, and post operative follow up is essential. Patient will continues to check in monthly with this writer to help stay on track until surgery is scheduled.     Plan for next visit:   Check In    Assessment/Plan:  1. Obesity, morbid, BMI 50 or higher (H)      2. Hypertension, unspecified type      3. Mixed hyperlipidemia      4. Nutritional counseling        Phone call duration: 30 minutes    Zaina Cerda RD

## 2021-06-15 NOTE — PROGRESS NOTES
I have submitted a prior authorization request on behalf of this patient to [employer] for approval of a LSG with Dr. Aubrey Olmos

## 2021-06-15 NOTE — PROGRESS NOTES
Nick Coley is 61 y.o.  male who presents for a billable video visit today.    How would you like to obtain your AVS? MyChart.  If dropped from the video visit, the video invitation should be resent by: Text to cell phone: [phone]  Will anyone else be joining your video visit? No      Video Start Time: 9:00 am    Provider Notes:   HPI: Nick Coley is a 61 y.o. male here today for consideration of metabolic and bariatric surgery. He is referred by Dr. Weston.  He has struggled with obesity for most of his adult life, noting that his weight gain has been primarily after he should high school.  Over the past 40 years he reports gradually gaining weight, despite multiple attempts at weight loss under his own direction as well as working with our comprehensive weight management program on medically supervised weight loss.  He initially considered bariatric surgery 3 to 4 years ago, but decided that he did not needed as he felt he could not lose weight on his own.  Despite diligently trying, he was unsuccessful with these efforts and return to consideration of bariatric surgery as a result.  His hope is to pursue bariatric surgery is that he can feel better, and able greater activity and have a longer life and better quality of life to spend time with his family and grandchildren.  Feels that he has a very strong support system at home as well as at work and with friends.    His bariatric comorbidities include nonalcoholic fatty liver disease, hypertension, obstructive sleep apnea requiring CPAP.  He also has a longstanding history of kidney stones, and is scheduled to undergo a percutaneous nephrolithotomy in a month.    Bariatric comorbidities not present include type 2 diabetes, gastroesophageal reflux disease.      Allergies:Adhesive tape-silicones and Other allergy (see comments)    Past Medical History:   Diagnosis Date     Abnormal LFTs (liver function tests) 12/18/2015    Negative hepatitis C antibody  and ferritin of 330 6 December 2015, steatohepatitis suspected     Cellulitis of left lower extremity 5/29/2020     Cellulitis, face 2014     Chronic tension-type headache, not intractable 6/10/2019     Depression with anxiety      Essential hypertension 12/18/2015     Fatty liver     ultrasound 2001 demonstrated.     Hematoma of leg, left, subsequent encounter 7/23/2018     History of colon polyps     Colonoscopy November 2018 with multiple polyps removed, repeat in 3 years     Hyperlipidemia      Irritable bowel syndrome      Kidney stones     x 4     LBP (low back pain)      Microscopic hematuria 7/23/2018     Moderate major depression (H) 6/10/2019     Morbid obesity (H) 12/18/2015     Nephrolithiasis     2013, 2009, 2001     Suspected sleep apnea 11/16/2018     Tear of meniscus of right knee 12/18/2015    Associated with right tibial plateau fracture 2013, arthroscopic knee surgery December 2015     Vitamin D deficiency     Vitamin D 10.0 November 2018     Wrist fracture        Past Surgical History:   Procedure Laterality Date     CARPAL TUNNEL RELEASE Right 2001     EXTRACORPOREAL SHOCK WAVE LITHOTRIPSY  2013    for kidney stones     IA KNEE SCOPE,SINGLE MENISECTOMY Right 12/22/2015    Procedure: RIGHT KNEE ARTHROSCOPIC MENISCECTOMY;  Surgeon: Chandu Moyer MD;  Location: [facility];  Service: Orthopedics     WISDOM TOOTH EXTRACTION  2013       CURRENT MEDS:  Current Outpatient Medications   Medication Sig Dispense Refill     aspirin 81 MG EC tablet Take 1 tablet (81 mg total) by mouth daily. 90 tablet 3     cholecalciferol, vitamin D3, 5,000 unit Tab Take by mouth daily.       citalopram (CELEXA) 20 MG tablet Take 1 tablet (20 mg total) by mouth daily. 90 tablet 3     lisinopriL-hydrochlorothiazide (ZESTORETIC) 20-25 mg per tablet Take 1 tablet by mouth daily. 90 tablet 3     rosuvastatin (CRESTOR) 10 MG tablet Take 1 tablet (10 mg total) by mouth daily. 90 tablet 3     tamsulosin (FLOMAX) 0.4 mg  "cap Take 1 capsule (0.4 mg total) by mouth daily. 30 capsule 0     No current facility-administered medications for this visit.          Family History   Adopted: Yes   Problem Relation Age of Onset     Diabetes Mother      Anesthesia problems Neg Hx         reports that he has never smoked. He has never used smokeless tobacco. He reports current alcohol use. He reports that he does not use drugs.    Review of Systems -  A complete ROS was reviewed and except for what is listed in the HPI above, all others are negative  PSYCHIATRIC: He has undergone a lifestyle assessment and has been deemed a good candidate for bariatric surgery by the psychologist.    Ht 5' 7.5\" (1.715 m)   Wt (!) 342 lb (155.1 kg)   BMI 52.77 kg/m    Wt Readings from Last 3 Encounters:   02/18/21 (!) 342 lb (155.1 kg)   12/03/20 (!) 341 lb (154.7 kg)   11/05/20 (!) 342 lb (155.1 kg)     Body mass index is 52.77 kg/m .    EXAM:  GENERAL: This is a well-developed 61 y.o. male who appears his stated age  HEAD & NECK: Grossly normal.  No visible goiter or scars  CARDIAC: Unable to assess given video visit  CHEST/LUNG: Normal respiratory effort  ABDOMEN: Obese.  No visible hernias or masses appreciated.  LYMPHATIC:  No significant adenopathy visualized.    EXTREMITIES: Grossly normal.  No evidence of chronic venous stasis.    NEUROLOGIC: Focally intact  INTEGUMENT: No open lesions or ulcers appreciated visually  PSYCHIATRIC: Normal affect. He has a good grasp on the nature of his obesity and the treatment options.    LABS:  Lab Results   Component Value Date    WBC 6.9 12/03/2020    HGB 15.3 12/03/2020    HCT 46.1 12/03/2020    MCV 89 12/03/2020     12/03/2020     INR/Prothrombin Time      Lab Results   Component Value Date    HGBA1C 5.6 09/25/2020     Lab Results   Component Value Date    ALT 82 (H) 09/25/2020    AST 57 (H) 09/25/2020    ALKPHOS 52 09/25/2020    BILITOT 0.8 09/25/2020       Assessment/Plan: 61 y.o. male who is an excellent " candidate for bariatric and metabolic surgery.  After a careful conversation with the patient it was decided that a laparoscopic sleeve gastrectomy would be his best option.       I went over the surgery in detail with him.  I went over the nature of the operation and some of the potential consequences of the surgery.  I went over the expected hospital course and discussed laparoscopic versus open surgery, understanding that we will plan on doing this laparoscopically with the possibility of having to convert to an open operation.  I went over some of the risks and complications of the operation including, but not limited to, DVT, pulmonary emboli, pneumonia, postoperative bleeding, wound infection, staple line leak, intra-abdominal sepsis, and possible death.  I also went over some of the potential nutritional concerns such as vitamin B-12, iron, vitamin D, vitamin A, calcium and protein deficiencies.  I will also went over the need for lifelong nutritional surveillance.  The patient understands and wants to proceed with surgery.  We will submit for prior authorization.      Aubrey Olmos MD  [facility] Department of Surgery      Video-Visit Details    Type of service:  Video Visit    Video End Time (time video stopped): 0935  Originating Location (pt. Location): Home    Distant Location (provider location):  [facility] [place]     Platform used for Video Visit: [employer]

## 2021-06-15 NOTE — PATIENT INSTRUCTIONS - HE
"  Preparing for Your Surgery  Getting started  A surgery nurse will call you to review your health history and instructions. They will give you an arrival time based on your scheduled surgery time.  Please be ready to share the following:    Your doctor's clinic name and phone number    Your medical, surgical and anesthesia history    A list of allergies and sensitivities    A list of medicines, including herbal treatments and over-the-counter drugs    Whether the patient has a legal guardian (ask how to send us the papers in advance)  If your child is having surgery, please ask for a copy of Preparing for Your Child's Surgery.    Preparing for surgery    Within 30 days of surgery: Have an exam at your family clinic (primary care clinic), or go to a pre-operative clinic. This exam is called a \"History and Physical,\" or H&P.    At your H&P exam, talk to your care team about all medicines you take. If you need to stop any medicines before surgery, ask when to start taking them again.  ? We do this for your safety. Many medicines can make you bleed too much during surgery. Some change how well surgery (anesthesia) drugs work.    Call your insurance company to see what it will and won't pay for. Ask if they need to pre-approve the surgery. (If no insurance, call [phone].)    Call your surgeon's clinic if there's any change in your health. This includes signs of a cold or flu (sore throat, runny nose, cough, rash, fever). It also includes a scrape or scratch near the surgery site.    If you have questions on the day of surgery, call your surgery center.  Eating and drinking guidelines  For your safety: Unless your surgeon tells you otherwise, follow the guidelines below.    Eat and drink as usual until 8 hours before surgery. After that, no food or milk.    Drink clear liquids until 2 hours before surgery. These are liquids you can see through, like water, Gatorade and Propel Water. You may also have black coffee " and tea (no cream or milk).    Nothing by mouth within 2 hours of surgery. This includes gum, candy and breath mints.    Stop alcohol the midnight before surgery.    If your family clinic tells you to take medicine on the morning of surgery, it's okay to take it with a sip of water.  Preventing infection    Shower or bathe the night before and morning of your surgery. Follow the instructions your clinic gave you. (If no instructions, use regular soap.)    Don't shave or clip hair near your surgery site. This can lead to skin infection.    Don't smoke the morning of surgery. Smoking increases the risk of infection. You may chew nicotine gum up to 2 hours before surgery. A nicotine patch is okay.  ? Note: Some surgeries require you to completely quit smoking and nicotine. Check with your surgeon.    Your care team will make every effort to keep you safe from infection. We will:  ? Clean our hands often with soap and water (or an alcohol-based hand rub).  ? Clean the skin at your surgery site with a special soap that kills germs. We'll also remove hair from the site as needed.  ? Wear special hair covers, masks, gowns and gloves during surgery.  ? Give antibiotic medicine, if prescribed. Not all surgeries need antibiotics.  What to bring on the day of surgery    Photo ID and insurance card    Copy of your health care directive, if you have one    Glasses and hearing aides (bring cases)  ? You can't wear contacts during surgery    Inhaler and eye drops, if you use them (tell us about these when you arrive)    CPAP machine or breathing device, if you use them    A few personal items, if spending the night    If you have . . .  ? A pacemaker or ICD (cardiac defibrillator): Bring the ID card.  ? An implanted stimulator: Bring the remote control.  ? A legal guardian: Bring a copy of the certified (court-stamped) guardianship papers.  Please remove any jewelry, including body piercings. Leave jewelry and other valuables at  home.  If you're going home the day of surgery  Important: If you don't follow the rules below, we must cancel your surgery.     Arrange for someone to drive you home after surgery. You may not drive, take a taxi or take public transportation by yourself (unless you'll have local anesthesia only).    Arrange for a responsible adult to stay with you overnight. If you don't, we may keep you in the hospital overnight, and you may need to pay the costs yourself.  Questions?   If you have any questions for your care team, list them here: _________________________________________________________________________________________________________________________________________________________________________________________________________________________________________________________________________________________________________________________  For informational purposes only. Not to replace the advice of your health care provider. Copyright   9319-5073 [employer]. All rights reserved. Clinically reviewed by Katya Bryan MD. [employer] [id number] - REV 07/19.      Before Your Procedure or Hospital Admission  Testing for COVID-19 (Coronavirus)  Thank you for choosing [facility] for your health care needs. This is a very challenging time for everyone. The World Health Organization and the [place] of [place] have declared the COVID-19 virus a pandemic.   Our goal is to keep you and our team here at [facility] safe and healthy. We've taken several steps to make this happen. For example:    We screen our staff, care teams and patients for COVID-19.    Everyone at [facility] must wear a mask and stay 6 feet apart.    We are limiting hospital and clinic visitors.  Before you come in  All patients must get tested for COVID-19. Your test needs to happen 2 to 4 days before you check in to the hospital or surgery site.   A clinic scheduler will call about a week in advance to set up a testing time at  "one of our labs where we'll take a swab of your nose or throat.  Note: If you go to a clinic or pharmacy like [facility] or [facility] for your test, make sure it's a \"RT-PCR\" test, not a \"rapid\" COVID-19 test. (See Questions and Answers below.)  After the test, please stay at home and stay out of contact with other people. It will be harder for you to recover if you get COVID-19 before your treatment.  Please follow all current safety guidelines, including:    Limit trips outside your home.    Limit the number of people you see.    Always wear a mask outside your home.    Use social distancing. (Stay 6 feet away from others whenever you can.)    Wash your hands often.  If your test shows you have COVID-19  If your test is positive, we'll let you know. A positive test means that you have the virus.   We'll probably have to postpone your admission, surgery or procedure. Your doctor will discuss this with you. After that, we'll let you know what to do and when you can reschedule.   We may need to cancel your treatment on short notice for other reasons, too.  If your test shows you DON'T have COVID-19  Even if your test is negative, you may still get COVID-19. It's rare but, sometimes, the test result is wrong. You could also catch the virus after taking the test.   There's a very small chance that you could catch COVID-19 in the hospital or surgery center. [facility] has taken many steps to prevent this from happening.   Day of your surgery or procedure    Please come wearing a mask or something else that covers both your nose and mouth.    When you arrive, we'll ask you some questions to find out if you have any signs or symptoms of COVID-19.    Ask your care team if you can have visitors. All visitors must wear masks and will be screened for signs of COVID-19.  ? Even if no visitors are allowed, you can still have with you:    Your legal guardian or legal decision maker    A parent and one other visitor, if you are " "younger than 18 years old    A partner and a , if you are in labor  ? We might need to teach you about taking care of yourself after surgery. If so, a visitor can come into the hospital to learn about it, too.  ? The rules for visitors change often, depending on how much the virus is spreading. To learn more, see Visiting a Loved One in the Hospital during the COVID-19 Outbreak.  Please call your care team, hospital or surgery center if you have any questions. We thank you for your understanding and for choosing [facility] for your care.   Questions and Answers  Does it matter where I get tested for COVID-19?  Yes. We urge you to get tested at one of our [facility] COVID-19 testing sites. We process these tests in our lab and can get the results quickly. Your [facility] care team needs to get your results before you check in.  What should I do if I can't get tested at [facility]?  You can get tested somewhere else, but you'll need to take these extra steps:  1. Contact your family doctor or clinic to arrange your test.  2. Take the test within 4 days of your surgery or procedure. We can't accept tests older than 4 days.  3. Make sure your doctor or clinic faxes your results to [facility] at [phone].  If we don't get your results in time, we may have to postpone or cancel your treatment.  Ask if you're getting a \"RT-PCR\" COVID-19 test. It should NOT be a \"rapid\" COVID-19 test. Many drug stores use \"rapid\" tests, but they may not be as accurate. We don't accept the results of \"rapid\" tests.  For informational purposes only. Not to replace the advice of your health care provider. Copyright   2020 [employer]. All rights reserved. Clinically reviewed by In[facility] and the [facility] COVID-19 Clinical Team. [employer] [id number] - REV 10/20.    How to Take Your Medication Before Surgery    Avoid taking aspirin, multivitamins, and OTC NSAIDs such as " ibuprofen, Motrin, Advil, and Aleve for the week prior to surgery    On the morning of surgery, take your usual dose of citalopram but hold other medications including lisinopril/HCTZ

## 2021-06-15 NOTE — PROGRESS NOTES
[facility]  [address] [place] [id number]  Dept: [phone]  Dept Fax: [phone]  Primary Provider: Long Weston MD  Pre-op Performing Provider: LONG WESTON    PREOPERATIVE EVALUATION:  Today's date: 3/4/2021    Nick Coley is a 61 y.o. male who presents for a preoperative evaluation.    Surgical Information:  Surgery/Procedure: PERCUTANEOUS NEPHROLITHOTOMY  Surgery Location: [facility]  Surgeon: Dr Luis  Surgery Date: 3-  Time of Surgery: 7:30 AM  Where patient plans to recover: At home with family  Fax number for surgical facility: [phone]    Type of Anesthesia Anticipated: General    Assessment & Plan      The proposed surgical procedure is considered INTERMEDIATE risk.    Preop general physical exam  Overall risk is low and no contraindications to proceeding with planned surgery and general anesthesia.  He will avoid aspirin and NSAIDs for 1 week prior to surgery.  DVT prophylaxis with pneumoboots and early ambulation.  - Basic Metabolic Panel  - HM2 (CBC w/o Differential)    Nephrolithiasis  Large staghorn calculus measuring 4.2 x 3.5 x 2 cm in right kidney.  Plans for percutaneous nephrolithotomy to be performed by Dr. Luis.     Essential hypertension  Blood pressure is well controlled with lisinopril/HCTZ.  He is instructed to hold the medication containing diuretic on the morning of surgery but this should be restarted postoperatively unless hypotension is present  - Basic Metabolic Panel  - HM2 (CBC w/o Differential)    Moderate major depression (H)  Mood is well controlled taking citalopram which she will take on the morning of surgery with a small sip of water    ANAM (obstructive sleep apnea)  He has diagnosis of sleep apnea and is trying to tolerate wearing CPAP at night.  He is instructed to bring his CPAP machine with him on the day of surgery and should be worn that evening postoperatively.             Medication  Instructions:  Avoid taking aspirin, multivitamins, and OTC NSAIDs such as ibuprofen, Motrin, Advil, and Aleve for the week prior to surgery    On the morning of surgery, take your usual dose of citalopram but hold other medications including lisinopril/HCTZ    RECOMMENDATION:  APPROVAL GIVEN to proceed with proposed procedure, without further diagnostic evaluation.                Subjective     HPI related to upcoming procedure: 61-year-old male with hypertension and morbid obesity along with sleep apnea with large staghorn calculus involving right kidney with plans for percutaneous nephrolithotomy later this month.    He has tolerated previous surgery and anesthesia without any complications.    No history of coronary artery disease.  No exertional chest pain.  No dyspnea with exertion.    He does have obstructive sleep apnea and is trying to tolerate wearing CPAP at night.    No personal or family history of DVT or pulmonary embolus    Preop Questions 2/25/2021   Have you ever had a heart attack or stroke? No   Have you ever had surgery on your heart or blood vessels, such as a stent placement, a coronary artery bypass, or surgery on an artery in your head, neck, heart, or legs? No   Do you have chest pain with activity? No   Do you have a history of  heart failure? No   Do you currently have a cold, bronchitis or symptoms of other infection? No   Do you have a cough, shortness of breath, or wheezing? No   Do you or anyone in your family have previous history of blood clots? No   Do you or does anyone in your family have a serious bleeding problem such as prolonged bleeding following surgeries or cuts? No   Have you ever had problems with anemia or been told to take iron pills? No   Have you had any abnormal blood loss such as black, tarry or bloody stools? No   Have you ever had a blood transfusion? No   Are you willing to have a blood transfusion if it is medically needed before, during, or after your surgery?  Yes   Have you or any of your relatives ever had problems with anesthesia? No   Do you have sleep apnea, excessive snoring or daytime drowsiness? YES -diagnosed with sleep apnea and prescribed CPAP   Do you have a CPAP machine? Yes   Do you have any artifical heart valves or other implanted medical devices like a pacemaker, defibrillator, or continuous glucose monitor? No   Do you have artificial joints? No   Are you allergic to latex? Yes -he has had some mild skin irritation that he attributes to latex     Health Care Directive:  Patient does not have a Health Care Directive or Living Will:         Review of Systems  CONSTITUTIONAL: NEGATIVE for fever, chills, change in weight  RESP: NEGATIVE for significant cough or SOB  CV: NEGATIVE for chest pain, palpitations or peripheral edema  ROS otherwise negative    Patient Active Problem List    Diagnosis Date Noted     ANAM (obstructive sleep apnea) 01/05/2021     Abnormal liver function 12/07/2020     History of colon polyps      Moderate major depression (H) 06/10/2019     Chronic tension-type headache, not intractable 06/10/2019     Vitamin D deficiency      Suspected sleep apnea 11/16/2018     Microscopic hematuria 07/23/2018     LBP (low back pain)      Hyperlipidemia      Nephrolithiasis      Irritable bowel syndrome      Essential hypertension 12/18/2015     Abnormal LFTs (liver function tests) 12/18/2015     Morbid obesity (H) 12/18/2015     Past Medical History:   Diagnosis Date     Abnormal LFTs (liver function tests) 12/18/2015    Negative hepatitis C antibody and ferritin of 330 6 December 2015, steatohepatitis suspected     Cellulitis of left lower extremity 5/29/2020     Cellulitis, face 2014     Chronic tension-type headache, not intractable 6/10/2019     Depression with anxiety      Essential hypertension 12/18/2015     Fatty liver     ultrasound 2001 demonstrated.     Hematoma of leg, left, subsequent encounter 7/23/2018     History of colon polyps      Colonoscopy November 2018 with multiple polyps removed, repeat in 3 years     Hyperlipidemia      Irritable bowel syndrome      Kidney stones     x 4     LBP (low back pain)      Microscopic hematuria 7/23/2018     Moderate major depression (H) 6/10/2019     Morbid obesity (H) 12/18/2015     Nephrolithiasis     2013, 2009, 2001     Suspected sleep apnea 11/16/2018     Tear of meniscus of right knee 12/18/2015    Associated with right tibial plateau fracture 2013, arthroscopic knee surgery December 2015     Vitamin D deficiency     Vitamin D 10.0 November 2018     Wrist fracture      Past Surgical History:   Procedure Laterality Date     CARPAL TUNNEL RELEASE Right 2001     EXTRACORPOREAL SHOCK WAVE LITHOTRIPSY  2013    for kidney stones     OK KNEE SCOPE,SINGLE MENISECTOMY Right 12/22/2015    Procedure: RIGHT KNEE ARTHROSCOPIC MENISCECTOMY;  Surgeon: Chandu Moyer MD;  Location: [facility];  Service: Orthopedics     WISDOM TOOTH EXTRACTION  2013     Current Outpatient Medications   Medication Sig Dispense Refill     aspirin 81 MG EC tablet Take 1 tablet (81 mg total) by mouth daily. 90 tablet 3     cholecalciferol, vitamin D3, 5,000 unit Tab Take by mouth daily.       citalopram (CELEXA) 20 MG tablet Take 1 tablet (20 mg total) by mouth daily. 90 tablet 3     lisinopriL-hydrochlorothiazide (ZESTORETIC) 20-25 mg per tablet Take 1 tablet by mouth daily. 90 tablet 3     rosuvastatin (CRESTOR) 10 MG tablet Take 1 tablet (10 mg total) by mouth daily. 90 tablet 3     tamsulosin (FLOMAX) 0.4 mg cap Take 1 capsule (0.4 mg total) by mouth daily. 30 capsule 0     No current facility-administered medications for this visit.        Allergies   Allergen Reactions     Adhesive Tape-Silicones Rash     Silk tape not paper tape     Other Allergy (See Comments) Rash     Silk tape not paper tape       Social History     Tobacco Use     Smoking status: Never Smoker     Smokeless tobacco: Never Used     Tobacco comment: cigar  "once or twice a year   Substance Use Topics     Alcohol use: Yes     Comment: just during softball season or vacation      Family History   Adopted: Yes   Problem Relation Age of Onset     Diabetes Mother      Anesthesia problems Neg Hx      Social History     Substance and Sexual Activity   Drug Use No        Objective     /70 (Patient Site: Right Arm, Patient Position: Sitting, Cuff Size: Adult Large)   Pulse 66   Temp 97.8  F (36.6  C) (Tympanic)   Ht 5' 7.5\" (1.715 m)   Wt (!) 341 lb (154.7 kg)   SpO2 97%   BMI 52.62 kg/m    Physical Exam  GENERAL APPEARANCE: healthy, alert and no distress  HENT: Normal  RESP: lungs clear to auscultation - no rales, rhonchi or wheezes  CV: regular rate and rhythm, normal S1 S2, no S3 or S4 and no murmur, click or rub  ABDOMEN: soft, nontender, no HSM or masses and bowel sounds normal  NEURO: Normal strength and tone, sensory exam grossly normal, mentation intact and speech normal        PRE-OP Diagnostics:   Labs-  Hemoglobin 14.9 platelet 190  Potassium 4.1 creatinine 1.08    No EKG required, no history of coronary heart disease, significant arrhythmia, peripheral arterial disease or other structural heart disease.    REVISED CARDIAC RISK INDEX (RCRI)   The patient has the following serious cardiovascular risks for perioperative complications:   - No serious cardiac risks = 0 points    RCRI INTERPRETATION: 0 points: Class I (very low risk - 0.4% complication rate)           Signed Electronically by: Long Weston MD    Copy of this evaluation report is provided to requesting physician.    [facility] Guidelines    Revised Cardiac Risk Index  "

## 2021-06-15 NOTE — PROGRESS NOTES
Tasklist updated.    Hannah Davis RN, CBN  [facility] [place] [facility]  P [phone]  F [phone]

## 2021-06-16 ENCOUNTER — COMMUNICATION - HEALTHEAST (OUTPATIENT)
Dept: SURGERY | Facility: CLINIC | Age: 61
End: 2021-06-16
Payer: COMMERCIAL

## 2021-06-16 NOTE — PROGRESS NOTES
I have Nick scheduled for a LSG with Dr. Olmos on 06/15/21 @ 7:30 am.  Scheduled for pre op class on 05/26/21 and he will be doing his pre op and testing at [facility],    [facility] # [id number]  02/22/21-12/31/21

## 2021-06-16 NOTE — PROGRESS NOTES
"Nick Coley is a 61 y.o. male who is being evaluated via a billable telephone visit.      The patient has been notified of following:     \"This telephone visit will be conducted via a call between you and your physician/provider. We have found that certain health care needs can be provided without the need for a physical exam.  This service lets us provide the care you need with a short phone conversation.  If a prescription is necessary we can send it directly to your pharmacy.  If lab work is needed we can place an order for that and you can then stop by our lab to have the test done at a later time.    Telephone visits are billed at different rates depending on your insurance coverage. During this emergency period, for some insurers they may be billed the same as an in-person visit.  Please reach out to your insurance provider with any questions.    If during the course of the call the physician/provider feels a telephone visit is not appropriate, you will not be charged for this service.\"    Patient has given verbal consent to a Telephone visit? Yes    What phone number would you like to be contacted at? [phone]    Patient would like to receive their AVS by AVS Preference: E-Mail (Inform patient AVS not encrypted with this option).    Additional provider notes: insert own note template here None    Phone call duration: 30 minutes    Zaina Cerda RD        Follow Up Surgical Weight Loss Supervised Diet Evaluation    Assessment:  Pt. is being seen today for a follow up RD nutritional evaluation. Pt. has been unsuccessful with non-surgical weight loss methods and is interested in bariatric surgery. Today we reviewed current eating habits and level of physical activity, and instructed on the changes that are required for successful bariatric outcomes.    Surgery of interest per pt: LSG. (Scheduled for 6/15/2021)    Workflow review:  Support Group: Completed.  Psychology:Completed.  Lab " work:Completed.  SWL:Yes 9th Visit   C-Pap for Sleep Apnea     Weight goal: 6 lbs or more .    Anthropometrics:  Pt's No data recorded  BMI: There is no height or weight on file to calculate BMI.   Initial Weight: 351 lbs   Current Weight: 347 lbs (as of kidney stone removal surgery)   Patient weight not recorded  Estimated RMR ([place]-St Jeor equation):  2360 kcals x 1.3 (light active) = 3068 kcals (for weight maintenance)    Medical History:  Patient Active Problem List   Diagnosis     Essential hypertension     Morbid obesity (H)     LBP (low back pain)     Hyperlipidemia     Nephrolithiasis     Irritable bowel syndrome     Microscopic hematuria     Vitamin D deficiency     Moderate major depression (H)     Chronic tension-type headache, not intractable     History of colon polyps     ANAM (obstructive sleep apnea)     Abnormal liver function     Obesity, morbid, BMI 50 or higher (H)      Diabetes: No   HbA1c:    Hemoglobin A1c   Date/Time Value Ref Range Status   09/25/2020 04:35 PM 5.6 <=5.6 % Final     Comment:     Normal <5.7% Prediabete 5.7-6.4% Diabletes 6.5% or higher - adopted from ADA consensus guidelines     Progress over past month: Patient reports that he has been trying to focus on be consistent with consuming protein first (I.e. chicken, protein shakes, eggs, etc..).  Patient reports that he has also been working on trying to incorporate more vegetables into his regimen.     Meal duration: 20-30 minutes.    fluids by 30 minutes before, during meal, and waiting 30 minutes after meal before drinking fluids: Yes  Beverages  Water-at least 64 oz/day, occasional cup of iced coffee (0-1 time/week), V8 Juice, occasional Tea     Exercise  Has gone down this past month (recovering from kidney stone surgery back in March), plans on starting to walk again with his wife for exercise.      PES statement:   Overweight/Obesity (NC 3.3) related to overeating and poor lifestyle habits as evidenced by  patient's subjective statements (h/o excessive energy intake, portion sizes) and BMI of 53.6 kg/m2.     Intervention    Nutrition Education:   1. Provided general overview of diet and lifestyle modifications needed to be a deemed a safe candidate for bariatric surgery.     Food/Nutrient Delivery:  2. Educated patient on eating three meals, with cutting out snacking.  3. Bariatric Plate: Patient and I discussed the importance of including a lean protein source (20-30 grams/meal), vegetables (included at lunch and dinner), one serving (15g) of carbohydrate, and limited added fat (1 tb/day) at each meal.   4. Educated patient on using a protein powder drink as a meal replacement and/or supplement after bariatric surgery.   5. Discussed importance of adequate hydration after surgery, with goal of at least 64 oz of fluids/day.  6. Addressed avoiding all carbonated, caffeinated and sweetened drinks to prepare for bariatric surgery.     Nutrition Counselin. Mindful eating techniques: Encouraged slow meal pace, chewing foods to applesauce consistency for 20-30 minutes/meal.   8. Discussed  fluids 30 minutes before, during, and after meal to prevent dumping syndrome and discomfort post bariatric surgery.     Instructions/Goals:     1. Continue implementing bariatric surgery lifestyle modifications.    Handouts Provided:   [facility] Weight Management Patient Handbook    Monitor/Evaluation:  Pt. s target weight:  weight loss of 6 lbs or below, pt. verbalized understanding.     Pt has completed all nutrition requirements and is well-informed of the dietary and physical activity requirements that are necessary for successful bariatric outcomes. This pt is an appropriate candidate for surgery from a nutrition standpoint at this time. The patient understands that surgery is a tool, not a cure, and post operative follow up is essential.     Plan for next visit:   Check In     Assessment/Plan:  1. Obesity,  morbid, BMI 0 or higher (H)      2. Hypertension, unspecified type      3. Mixed hyperlipidemia      4. Nutritional counseling        Phone call duration: 30 minutes    Zaina Cerda, RD

## 2021-06-16 NOTE — PROGRESS NOTES
Tasklist updated.    Hannah Davis RN, CBN  [facility]  P [phone]  F [phone]

## 2021-06-17 ENCOUNTER — AMBULATORY - HEALTHEAST (OUTPATIENT)
Dept: SURGERY | Facility: CLINIC | Age: 61
End: 2021-06-17

## 2021-06-17 NOTE — PROGRESS NOTES
"Nick Coley is a 61 y.o. male who is being evaluated via a billable telephone visit.      The patient has been notified of following:     \"This telephone visit will be conducted via a call between you and your physician/provider. We have found that certain health care needs can be provided without the need for a physical exam.  This service lets us provide the care you need with a short phone conversation.  If a prescription is necessary we can send it directly to your pharmacy.  If lab work is needed we can place an order for that and you can then stop by our lab to have the test done at a later time.    Telephone visits are billed at different rates depending on your insurance coverage. During this emergency period, for some insurers they may be billed the same as an in-person visit.  Please reach out to your insurance provider with any questions.    If during the course of the call the physician/provider feels a telephone visit is not appropriate, you will not be charged for this service.\"    Patient has given verbal consent to a Telephone visit? Yes    What phone number would you like to be contacted at? [phone]    Patient would like to receive their AVS by AVS Preference: E-Mail (Inform patient AVS not encrypted with this option).    Additional provider notes: insert own note template here None     Phone call duration: 25 minutes    Zaina Cerda RD        Follow Up Surgical Weight Loss Supervised Diet Evaluation    Assessment:  Pt. is being seen today for a follow up RD nutritional evaluation. Pt. has been unsuccessful with non-surgical weight loss methods and is interested in bariatric surgery. Today we reviewed current eating habits and level of physical activity, and instructed on the changes that are required for successful bariatric outcomes. Patient is here for a pre-op check in.     Surgery of interest per pt: LSG. (scheduled for Joanna 15, 2021)     Workflow review:  Support Group: " Completed.  Psychology:Completed.  Lab work:Completed.  SWL:Yes 10th Visit   C-Pap for Sleep Apnea     Weight goal: 6 lbs or more.    Anthropometrics:  Pt's No data recorded  BMI: There is no height or weight on file to calculate BMI.   Patient weight not recorded   Initial Weight: 351 lbs  Current Weight: 347 lbs  Estimated RMR ([place]-St Jeor equation):    2360 kcals x 1.3 (light active) = 3068 kcals (for weight maintenance)    Medical History:  Patient Active Problem List   Diagnosis     Essential hypertension     Morbid obesity (H)     LBP (low back pain)     Hyperlipidemia     Nephrolithiasis     Irritable bowel syndrome     Microscopic hematuria     Vitamin D deficiency     Moderate major depression (H)     Chronic tension-type headache, not intractable     History of colon polyps     ANAM (obstructive sleep apnea)     Abnormal liver function     Obesity, morbid, BMI 50 or higher (H)      Diabetes: No  HbA1c:    Hemoglobin A1c   Date/Time Value Ref Range Status   2020 04:35 PM 5.6 <=5.6 % Final     Comment:     Normal <5.7% Prediabete 5.7-6.4% Diabletes 6.5% or higher - adopted from ADA consensus guidelines     Progress over past month: Patient continues to focus on protein first, consuming foods such as eggs, protein bars, nuts, chicken breast, pork, etc... Patient reports that he has decided to take a break from the protein shakes, noting that he had a bad experience with it being .      Meal duration: 20-30 minutes.    fluids by 30 minutes before, during meal, and waiting 30 minutes after meal before drinking fluids: Yes    Exercise  Biking/Walking 8096-3136 steps/day    PES statement:   Overweight/Obesity (NC 3.3) related to overeating and poor lifestyle habits as evidenced by patient's subjective statements (h/o excessive energy intake, portion control) and BMI of 53.6 kg/m2.     Intervention    Nutrition Education:   1. Provided general overview of diet and lifestyle modifications  needed to be a deemed a safe candidate for bariatric surgery.     Food/Nutrient Delivery:  2. Educated patient on eating three meals, with cutting out snacking.  3. Bariatric Plate: Patient and I discussed the importance of including a lean protein source (20-30 grams/meal), vegetables (included at lunch and dinner), one serving (15g) of carbohydrate, and limited added fat (1 tb/day) at each meal.   4. Educated patient on using a protein powder drink as a meal replacement and/or supplement after bariatric surgery.   5. Discussed importance of adequate hydration after surgery, with goal of at least 64 oz of fluids/day.  6. Addressed avoiding all carbonated, caffeinated and sweetened drinks to prepare for bariatric surgery.     Nutrition Counselin. Mindful eating techniques: Encouraged slow meal pace, chewing foods to applesauce consistency for 20-30 minutes/meal.   8. Discussed  fluids 30 minutes before, during, and after meal to prevent dumping syndrome and discomfort post bariatric surgery.     Instructions/Goals:     1. Continue implementing bariatric surgery lifestyle modifications.    Handouts Provided:   [facility] Weight Management Patient Handbook      Monitor/Evaluation:  Pt. s target weight:  weight loss of 6 lbs or more/no gain from initial visit, pt. verbalized understanding.     Pt has completed all nutrition requirements and is well-informed of the dietary and physical activity requirements that are necessary for successful bariatric outcomes. This pt is an appropriate candidate for surgery from a nutrition standpoint at this time. The patient understands that surgery is a tool, not a cure, and post operative follow up is essential.     Plan for next visit:   Post Op Nutrition    Assessment/Plan:  1. Obesity, morbid, BMI 50 or higher (H)      2. Hypertension, unspecified type      3. Mixed hyperlipidemia      4. Nutritional counseling        Phone call duration: 25  minutes    Zaina Cerda, RD

## 2021-06-17 NOTE — PATIENT INSTRUCTIONS - HE
Medication Instructions:     Acetaminophen (Brand Name: Tylenol or MPAP)       You will likely be sent home on Tylenol to go with your other pain medications after surgery.  It is ok to cut/crush regular or extra strength tablets.  Make sure tablet is not long acting or extended release.  Do not take more than 3000mg in 24 hours.  If you decide to use liquid Tylenol at home, we recommend you use Adult strength because you will have to take less liquid to get the same effect but it can be harder to find in the stores and might have an easier time ordering it online prior to your surgery.  Dilute the medication 1:1 with water before taking the liquid version to prevent dumping or stomach upset.     Aspirin  Stop 7 days before date of surgery. Max of 81mg enteric coated aspirin per day after surgery is ok. Usually may restart 48 hours after surgery. Swallow enteric coated aspirin whole.     Citalopram (Brand Name: Celexa)  Ok to cut/crush. However, this medication may not be absorbed as well after bariatric surgery. Watch for changes in symptom control. Talk with primary care or mental health provider if you notice any changes in how well this medication is working after surgery. Ok to take as scheduled the morning of surgery with a sip of water.     Lisinopril/Hydrochlorothiazide (Brand Name: Prinzide)  Stop taking 2 weeks before surgery and talk to your primary care provider about an alternative medication. You may take just Lisinopril, which can be cut or crushed, but you should not take this the morning of surgery if switched to this medication. Ask primary doctor how often to monitor your blood pressure after surgery, and if you need to change your dosage.     Multivitamin with Iron   If not already taking, start chewable MVI with at least 18mg of iron and 10-15 mg of zinc twice a day at least 2 weeks prior to surgery and resume the day after surgery for life. Do not take the morning of surgery.     Omeprazole  (Brand Name: Prilosec)   If not already taking, you will get a prescription to start when you get home from the hospital.  Tablets cannot be cut or crushed.  If a capsule, entire capsule contents may be sprinkled on a spoonful of applesauce and taken immediately without chewing.  If only on a full liquid diet, dump capsule contents into a spoonful of liquid and take without chewing.  May swallow whole again starting 6 weeks after surgery but can try swallowing sooner if having issues with opening into food.  Continue after surgery for at least 3 months.     Rosuvastatin (Brand Name: Crestor)  Tablet should be swallowed whole. Do not cut/crush.     Ursodiol (Brand name: Actigall)  Start two weeks after surgery.  Swallow whole with warm water, do not cut, crush, or open capsule up.  This is a medication that will help to prevent gallstones from forming during the first 6 months post-op of rapid weight loss.  Prescription was sent to your pharmacy.     Vitamin B12   If not already taking, start daily sublingual (under the tongue) vitamin B12 1,000 mcg, Nascobal (nasal) vitamin B12 500 mcg every 7 days or monthly vitamin B12 injections at least 2 weeks prior to surgery and resume the day after surgery for life. Do not take the morning of surgery.      Vitamin D3 5000 IU  Ok to cut or crush tablet; if a softgel will likely need to swallow whole if small enough. If capsule is too large, may need to take tablet form until able to swallow larger pills again. Do not take the morning of surgery and don t restart again until instructed by the dietitian.     Medications After Surgery     Medication Dose When to Start   Vitamin B12  1000 mcg Once a day under the tongue (sublingual), weekly nasal spray or monthly injections Day after surgery   Chewable Multivitamin with Iron  (Must also contain Vitamin A and Zinc) 1 tablet twice daily Day after surgery   Omeprazole  20 mg 1 capsule daily - open and sprinkle on food or swallow  with fluid Day after surgery   Actigall (Ursodiol)- 300 mg for gallbladder if you still have Twice daily - swallow whole with warm water Two weeks after surgery   Chewable Calcium Citrate Twice daily Three weeks after surgery   Vitamin D - (125 mcg)  5000 units Once daily Three weeks after surgery         After Bariatric Surgery Discharge Instructions  [facility]      Note: Ask your nurse to order your medications from the pharmacy. Be sure you have your medications with you when you leave.     What should my diet be for the first 2 weeks after surgery?  Follow the bariatric diet the dietitian discussed with you.     How much fluid should I drink?  Strive for 48-64 ounces daily.  Carry a water bottle with you without a straw or sports top. Drink from it often.  Keep track of how much fluid you drink in a day.  Remember:  -Do not use straws, chew gum or suck on hard candies. They may cause painful gas.                 -Sip, don't slurp when you drink.                 -Practice small sips using a medicine cup for the first week postop.                -No ice or cold drinks. This could cause gas or spasms.                -No coffee, soda pop or drinks with caffeine. These may cause stomach pain.                -No alcohol. It is bad for your liver and will cause stomach pain.     How often should I do my deep breathing and coughing?  Use your incentive spirometer (small plastic breathing device) every hour while awake after you get home. Using the incentive spirometer helps you deep breath. Continuing to cough and deep breath will help prevent fevers and pneumonia.   If you do not have a fever after one week, you can stop using the incentive spirometer and discard it.     You can continue to take deep breaths without the incentive spirometer every one to two hours while awake for the month after surgery.     What kinds of activity can I do?  Get plenty of rest the first few days  after surgery and try to balance rest and activity. You will need some time to recover - you may be more tired than you realize at first. You'll feel better and heal faster if you take good care of yourself.  For 2 weeks after surgery (Please review restrictions at your two week visit, they could change based on how well you are doing):   -Don't lift more than 20 pounds.   -Take 4-5 short walks every day.  -Don't jog, run, or do belly exercises.  Don't swim, bathe or use a hot tub until your cuts are healed (scabs are gone).    You may shower right away after surgery.  Don't plan to fly or take a road trip within the first 1 to 3 months after surgery.  You could get a blood clot in your legs. If you must travel, get up and move around every hour for at least 5 minutes before continuing on your journey.  Your care team can help you decide when it's safe for you to travel.      What can I do for pain control?  You had major belly surgery that involves all layers of your belly muscles. Pain is expected, even for some as far out as 6-8 weeks after surgery. Moving, sneezing, coughing, and breathing will cause discomfort because these activities use your belly muscles.   Please see your after visit summary medication review for what pain medication will be continued, discontinued and newly started for you.    You can take opioid pain medicine if prescribed and if needed. Try to wean off from it as soon as you feel comfortable.   Do not drive while you are taking opioid pain medicine. This is dangerous.  The first three days after surgery, take your acetaminophen (Tylenol) scheduled every 6 hours.  After that you can take it as needed.  -Acetaminophen formulation options:   -Liquid   -Caplet (Cut caplet in half before taking)   -Do not take more than 3000 mg Tylenol in a 24 hour period.  -You may also take Tylenol for pain in place of the opioid pain medicine (check with your care team for specifics).   You can apply ice  or heat to the affected area(s). Just remember to wrap the ice in something and limit icing sessions to 20 minutes. Excessive icing can irritate the skin or cause skin damage.  You can apply heat with a hot, wet towel or heating pad. Just like cold therapy, limit heat application to 20 minutes. Never sleep with a heating pad on. It could cause severe burns to your skin.  Wear your binder to support your belly muscles if you have one.  Take this off a little more each day and try to be off completely by 2 weeks after surgery. If you don't need your binder for comfort or support, you don't need to wear it.   You may not be able to sleep in a comfortable position for a few weeks after surgery. This is normal. You may be more comfortable sleeping in a recliner or propped up with 3 pillows for the first couple of weeks after surgery.  You may feel gas pains in the upper chest or between your shoulder blades from the laparoscopic surgery which should get better with walking around, warm/cold packs and pain medication.  Do not take NSAID's (Non-Steroidal Anti-Inflammatory Drugs) (examples: ibuprofen, Motrin, Advil, Aleve, and Naproxen), aspirin, or use pain patches with NSAID's. They will increase your risk of bleeding or getting an ulcer.     Please call the clinic for any of the following pain concerns, we would like to talk to you:  -pain that does not improve with rest  -pain that gets worse and worse  -pain that is not controlled by your pain medicine  -a sudden severe increase in pain     What medications will I need to take after surgery?  You may be discharged with the following types of medications: antacids, pain medications, anti-nausea medications, etc.  Please see your after visit summary medication review for what will be continued, discontinued and newly started.  It is important to reduce the amount of acid in your new stomach for a couple of months after surgery while it is still healing. We will prescribe  an acid reducer or antacid. Take it as directed. This will help prevent ulcers, heartburn and acid reflux.  If you took an acid reducer before you had surgery, your care team will let you know what acid reducer you will take after surgery.   It is okay to swallow any medications smaller than   inch in size.   -If it is larger than   inch, it may need to be cut, crushed, or in a liquid form. See the above medication section for what is most appropriate for you and your medications.     What should I know about my incisions (cuts)?  Your incisions are covered with white steri strips or butterfly tape and have band aids or gauze over the top. If you have gauze or band aids, they can come off in the hospital.  Leave your steri strips on until they fall off on their own. If the steri strips don't fall off after 1 week, you can take them off. If they fall off earlier, replace them with clean band aids trying to avoid touching the incision itself.   If you have gauze covered by a clear dressing, remove 2-3 days after surgery or as directed by your care team.  You may shower in the hospital after surgery and can get your incision coverings wet.  Do not submerge in water (e.g. No baths, swimming pools, hot tubs) until your incisions are completely healed (scabs are gone).     Call the clinic if you have any of these signs or symptoms of infection:  -Redness around the site.  -Drainage that smells bad.  -Drainage that is thick yellow or green.  -An increase in pain around the incision site  -An increase in swelling around the incision site  -Heat or warmth around incision site   -Fever of 101.5  F (38.3  C) or higher when taken under the tongue.                 -Chills     Will my urine or bowel movements change?   Your first bowel movements (stools) will likely be liquid. You may also notice old blood or a darker color (black or maroon color) in your bowel movements.  This is not unusual and usually goes away after the first  week, if not sooner. You may not have a bowel movement for a week.   If you have not had a bowel movement for at least three days after your surgery date and are passing gas, you can use over the counter stool softeners.  Please stop taking the stool softeners and laxatives if your stools are loose.  Increasing fluids and activity as well as getting off narcotics will help prevent constipation.     Call the clinic if:   -You have stomach pain.   -You continue to have constipation.  -You have excessive bloating after walking and passing gas.  -You are having 3 or more loose stools a day.  You may have an infection that we need to treat.     How can I prevent dehydration if I feel nauseated (sick to my stomach) and vomit (throw up)?   Vomiting is not normal after surgery. If you continue to have nausea and vomiting, call the clinic.   Nausea can be a sign of dehydration. That is why it is very important to track your fluids. Do not nap more than one hour during the day. Set a timer to wake yourself up, if needed. Too much sleep will keep you from drinking enough fluid during the day and lead to dehydration.  No outside activity in hot, humid weather until you can drink 48 to 64 ounces of fluid in 24 hours. If you sweat a lot, your body may lose too much water.  If having difficulty getting in your fluids, try to take a 1 ounce sip of water (one medicine cup) every 15 minutes.  Set a timer to remind yourself.     If you notice any of the following symptoms, please don't delay in calling the clinic.  Early identification gives us more options for treatment:  -Dark colored urine  -Urinating (pass water) less than 2-3 times per day  -Lack of energy  -Nausea  -Dizziness  -Headache  -Metallic taste in your mouth     Call the clinic ANY TIME at [phone] if:  -Your pain medicine is not working.  -You have a fever ? 101.5 F.  -You have belly or left shoulder pain that gets worse and worse.  -You have a swollen leg with  "redness, warmth, or pain behind the knee or calf.  -You have chest pain   -You feel very short of breath.  -You have a sudden severe increase in heart rate.  -You have vomiting that gets worse and worse.  -You have constant nausea (feeling sick to your stomach) that does not go away with medication.  -You have trouble swallowing.  -You have an increasing feeling that \"something is not right\".  -You have hiccups that do not stop.  -You have any questions or concerns.     If you have to go to the Emergency Room, we prefer you go to the hospital that did your surgery. Please let them know that you had bariatric surgery and to notify your surgeon.     When should I go back to the clinic?  Follow up with your care team in 1 week.   If this appointment was not already made, please call: [phone]  Bariatric Nurse Line: [phone]          "

## 2021-06-17 NOTE — PATIENT INSTRUCTIONS - HE
Patient Instructions by Long Weston MD at 8/5/2019 10:40 AM     Author: Long Weston MD Service: -- Author Type: Physician    Filed: 8/5/2019 11:31 AM Encounter Date: 8/5/2019 Status: Addendum    : Long Weston MD (Physician)    Related Notes: Original Note by Long Weston MD (Physician) filed at 8/5/2019 11:29 AM         Patient Education   Understanding USDA MyPlate  The USDA (US Department of Agriculture) has guidelines to help you make healthy food choices. These are called MyPlate. MyPlate shows the food groups that make up healthy meals using the image of a place setting. Before you eat, think about the healthiest choices for what to put onto your plate or into your cup or bowl. To learn more about building a healthy plate, visit www.choosemyplate.gov.       The Food Groups    Fruits: Any fruit or 100% fruit juice counts as part of the Fruit Group. Fruits may be fresh, canned, frozen, or dried, and may be whole, cut-up, or pureed. Make half your plate fruits and vegetables.    Vegetables: Any vegetable or 100% vegetable juice counts as a member of the Vegetable Group. Vegetables may be fresh, frozen, canned, or dried. They can be served raw or cooked and may be whole, cut-up, or mashed. Make half your plate fruits and vegetables.     Grains: All foods made from grains are part of the Grains Group. These include wheat, rice, oats, cornmeal, and barley such as bread, pasta, oatmeal, cereal, tortillas, and grits. Grains should be no more than a quarter of your plate. At least half of your grains should be whole grains.    Protein: This group includes meat, poultry, seafood, beans and peas, eggs, processed soy products (like tofu), nuts (including nut butters), and seeds. Make protein choices no more than a quarter of your plate. Meat and poultry choices should be lean or low fat.    Dairy: All fluid milk products and foods made from milk that contain calcium, like yogurt  and cheese are part of the Dairy Group. (Foods that have little calcium, such as cream, butter, and cream cheese, are not part of the group.) Most dairy choices should be low-fat or fat-free.    Oils: These are fats that are liquid at room temperature. They include canola, corn, olive, soybean, and sunflower oil. Foods that are mainly oil include mayonnaise, certain salad dressings, and soft margarines. You should have only 5 to 7 teaspoons of oils a day. You probably already get this much from the food you eat.  Use [employer] to Help Build Your Meals  The [employer] can help you plan and track your meals and activity. You can look up individual foods to see or compare their nutritional value. You can get guidelines for what and how much you should eat. You can compare your food choices. And you can assess personal physical activities and see ways you can improve. Go to www.Novaliq.gov/O[employer] Mobilecker/.    1298-7182 [employer]. [address] [place] [address]. All rights reserved. This information is not intended as a substitute for professional medical care. Always follow your healthcare professional's instructions.           Patient Education     Aerobic Exercise for a Healthy Heart  Exercise is a lot more than an energy booster and a stress reliever. It also strengthens your heart muscle, lowers your blood pressure and cholesterol, and burns calories. It can also improve your resting muscle tone, and your mood.     Remember, some activity is better than none.    Choose an aerobic activity  Choose an activity that makes your heart and lungs work harder than they do when you rest or walk normally. This aerobic exercise can improve the way your heart and other muscles use oxygen. Make it fun by exercising with a friend and choosing an activity you enjoy. Here are some ideas:    Walking    Swimming    Bicycling    Stair climbing    Dancing    Jogging    Gardening  Exercise  regularly  If you havent been exercising regularly,  get your doctors OK first. Then start slowly.  Here are some tips:    Begin exercising 3 times a week for 5 to 10 minutes at a time.    When you feel comfortable, add a few minutes each session.    Slowly build up to exercising 3 to 4 times each week. Each session should last for 40 minutes, on average, and involve moderate- to vigorous-intensity physical activity.    If you have been given nitroglycerin, be sure to carry it when you exercise.    If you get chest pain (angina) when youre exercising, stop what youre doing, take your nitroglycerin, and call your doctor.  Date Last Reviewed: 6/2/2016 2000-2017 [employer]. [address] [place] [address], [place] [address]. All rights reserved. This information is not intended as a substitute for professional medical care. Always follow your healthcare professional's instructions.         Recommending new shingles vaccine, Shingrix.  Check with your insurance and pharmacy for coverage  Repeat colonoscopy in November 2021  Continue annual flu shots

## 2021-06-18 ENCOUNTER — COMMUNICATION - HEALTHEAST (OUTPATIENT)
Dept: SURGERY | Facility: CLINIC | Age: 61
End: 2021-06-18

## 2021-06-18 NOTE — PROGRESS NOTES
"OFFICE VISIT NOTE    Subjective:   Chief Complaint:  Leg Injury (had a softball hit him in the shin last Thursday. swelling and bruising are spreading and pain is worse)    58-year-old man who was hit in the left lower leg by a softball.  He was able to continue completely the next day was okay.  However 2 days after the injury began have swelling and discoloration around the ankle.  He is able to bear weight and walk but is quite stiff and sore when he first gets out of a chair.  Today he has some pain over the lateral malleolus as well.    Current Outpatient Prescriptions   Medication Sig     aspirin 325 MG EC tablet TAKE ONE TABLET BY MOUTH DAILY     citalopram (CELEXA) 20 MG tablet Take 1 tablet (20 mg total) by mouth daily.     ibuprofen (ADVIL,MOTRIN) 200 MG tablet Take 800 mg by mouth daily as needed for pain.     lisinopril-hydrochlorothiazide (PRINZIDE,ZESTORETIC) 10-12.5 mg per tablet Take 1 tablet by mouth daily. Follow-up appointment needed for future refills       Review of Systems:  A comprehensive review of systems is negative except for the comments above    Objective:    Pulse 72  Ht 5' 8\" (1.727 m)  Wt (!) 335 lb (152 kg)  SpO2 95%  BMI 50.94 kg/m2  GENERAL: No acute distress.  Definite swelling of the lower shin region and left ankle.  There is ecchymoses of the left ankle and extends down to the base of the toes.  Circulation and sensation are intact.  There are no breaks in the skin.  Good flexion and extension as well as inversion of the ankle joint.  Calf itself is okay.  No tenderness.  He does have swelling of the lower anterior shin region.    Assessment & Plan   Nick Coley is a 58 y.o. male.    Probable contusion of the lower left leg.  X-ray confirms there is no fracture.  I am recommending ice packs elevation a day or 2 off work for elevation to reduce the swelling.  Ibuprofen to reduce pain and swelling.    Diagnoses and all orders for this visit:    Screen for colon cancer  -    "  Ambulatory referral for Colonoscopy    Leg injury  -     XR Tibia and Fibula Right; Future; Expected date: 6/26/18        Ted Gaspar MD  Transcription using voice recognition software, may contain typographical errors.

## 2021-06-18 NOTE — PATIENT INSTRUCTIONS - HE
Patient Instructions by Long Weston MD at 11/5/2020  3:00 PM     Author: Long Weston MD Service: -- Author Type: Physician    Filed: 11/5/2020  3:53 PM Encounter Date: 11/5/2020 Status: Addendum    : Long Weston MD (Physician)    Related Notes: Original Note by Long Weston MD (Physician) filed at 11/5/2020  3:39 PM         Patient Education   Understanding USDA MyPlate  The USDA (US Department of Agriculture) has guidelines to help you make healthy food choices. These are called MyPlate. MyPlate shows the food groups that make up healthy meals using the image of a place setting. Before you eat, think about the healthiest choices for what to put onto your plate or into your cup or bowl. To learn more about building a healthy plate, visit www.choosemyplate.gov.       The Food Groups    Fruits: Any fruit or 100% fruit juice counts as part of the Fruit Group. Fruits may be fresh, canned, frozen, or dried, and may be whole, cut-up, or pureed. Make half your plate fruits and vegetables.    Vegetables: Any vegetable or 100% vegetable juice counts as a member of the Vegetable Group. Vegetables may be fresh, frozen, canned, or dried. They can be served raw or cooked and may be whole, cut-up, or mashed. Make half your plate fruits and vegetables.     Grains: All foods made from grains are part of the Grains Group. These include wheat, rice, oats, cornmeal, and barley such as bread, pasta, oatmeal, cereal, tortillas, and grits. Grains should be no more than a quarter of your plate. At least half of your grains should be whole grains.    Protein: This group includes meat, poultry, seafood, beans and peas, eggs, processed soy products (like tofu), nuts (including nut butters), and seeds. Make protein choices no more than a quarter of your plate. Meat and poultry choices should be lean or low fat.    Dairy: All fluid milk products and foods made from milk that contain calcium, like  yogurt and cheese are part of the Dairy Group. (Foods that have little calcium, such as cream, butter, and cream cheese, are not part of the group.) Most dairy choices should be low-fat or fat-free.    Oils: These are fats that are liquid at room temperature. They include canola, corn, olive, soybean, and sunflower oil. Foods that are mainly oil include mayonnaise, certain salad dressings, and soft margarines. You should have only 5 to 7 teaspoons of oils a day. You probably already get this much from the food you eat.  Use [employer] to Help Build Your Meals  The [employer] can help you plan and track your meals and activity. You can look up individual foods to see or compare their nutritional value. You can get guidelines for what and how much you should eat. You can compare your food choices. And you can assess personal physical activities and see ways you can improve. Go to www.[employer].gov/ConnectQuestcker/.    5729-1243 [employer]. [address] [place] [address], [place] [address]. All rights reserved. This information is not intended as a substitute for professional medical care. Always follow your healthcare professional's instructions.        Healthcare maintenance-    Recommending new shingles vaccine, Shingrix.  This can be obtained at your pharmacy.    Colonoscopy will be due 2021    Make sure you schedule an annual visit with your eye doctor    Continue regular dental appointments

## 2021-06-19 NOTE — PROGRESS NOTES
MALE PREVENTATIVE EXAM    Assessment and Plan:       1. Routine general medical examination at a health care facility  Immunizations are reviewed and will update tetanus.  Recommending annual flu shot.  Living will discussed.  Non-smoker.  Uses alcohol in moderation.  Regular exercise discussed.  Overdue for colonoscopy and will help get this scheduled.  Prostate exam is normal.  His PSA was normal.   Encouraging regular eye exams.  Skin exam performed and recommending regular use of sunblock.  Hepatitis C antibody for screening has been negative.  Fasting glucose normal.        2. Essential hypertension  Blood pressure under fair control with current dose of lisinopril/HCTZ.  Goal of systolic under 130.  Discussed weight loss and more regular exercise.    3. Hyperlipidemia  His cholesterol is elevated.  Both LDL cholesterol and triglycerides.  Discussed increased risk for cardiovascular disease.  He does take aspirin 81 mg daily.  We discussed starting a statin but he would prefer to try to bring under control with lifestyle modification.  Referring to bariatric center as discussed below.  We will plan to recheck lipids in 6 months at his return and if still not at goal, begin rosuvastatin    4. Abnormal LFTs (liver function tests)  Mildly elevated AST and ALT secondary to steatohepatitis.    5. Morbid obesity (H)  BMI 52.  Discussed the importance of weight loss and will refer to bariatric center to discuss nonsurgical treatment program.  - Ambulatory referral to Bariatric Care: Surgical and Non-Surgical    6. Depression with anxiety  Stable with current dose of citalopram his recent PHQ 9 score is 1    7. Hematoma of leg, left, subsequent encounter  Injury from softball.  Slowly resolving    8. Colon cancer screening    - Ambulatory referral for Colonoscopy    9. Microscopic hematuria  Microscopic hematuria identified on recent labs.  He does have history of nephrolithiasis and has had blood in his urinalysis  previously.  He is asymptomatic.  Will recheck UA UC.  - Urinalysis-UC if Indicated        Next follow up:  Return in about 6 months (around 1/23/2019) for Recheck.    Immunization Review  Adult Imm Review: Td        I discussed the following with the patient:   Adult Healthy Living: Importance of regular exercise  Healthy nutrition  Weight loss referral options    I have had an Advance Directives discussion with the patient.    Subjective:   Chief Complaint: Nick Coley is an 58 y.o. male here for a preventative health visit.     HPI: Discussed ongoing difficulties losing weight with morbid obesity.  He is up another 3 pounds.  Has not been able to exercise on a regular basis.  Aside from recent leg injury, he finds it difficult to find time to exercise and is not able to find the motivation.  Room for improvement with his diet.  He denies any exertional chest pain.  No increasing shortness of breath.  He has hypertension and continues on lisinopril/HCTZ.  Discussed results of lipid profile showing moderate elevation with .  Currently not on a statin.  Remains under fair amount of stress with home situation.  He tells me his mood is generally stable and he remains on citalopram.  Recent leg injury with hematoma.  Slowly improving.    Healthy Habits  Are you taking a daily aspirin? Yes  Do you typically exercising at least 40 min, 3-4 times per week?  NO  Do you usually eat at least 4 servings of fruit and vegetables a day, include whole grains and fiber and avoid regularly eating high fat foods? NO  Have you had an eye exam in the past two years? Yes  Do you see a dentist twice per year? Yes  Do you have any concerns regarding sleep? No    Safety Screen  If you own firearms, are they secured in a locked gun cabinet or with trigger locks? The patient does not own any firearms  Do you feel you are safe where you are living?: Yes (7/23/2018  2:12 PM)  Do you feel you are safe in your relationship(s)?: Yes  "(7/23/2018  2:12 PM)    Review of Systems:  Please see above.  The rest of the review of systems are negative for all systems.     Cancer Screening       Status Date      COLONOSCOPY Overdue 1/10/2012               History     Reviewed By Date/Time Sections Reviewed    Long Weston MD 7/23/2018  2:21 PM Medical, Surgical, Tobacco, Alcohol, Drug Use, Sexual Activity, Family, Social Documentation    Vero ALBERTO Dimas CMA 7/23/2018  2:12 PM Tobacco, Alcohol, Drug Use, Sexual Activity            Objective:   Vital Signs:   Visit Vitals     /74 (Patient Site: Left Arm, Patient Position: Sitting, Cuff Size: Adult Large)     Pulse 71     Ht 5' 7.5\" (1.715 m)     Wt (!) 338 lb (153.3 kg)     SpO2 94%     BMI 52.16 kg/m2          PHYSICAL EXAM  EYES: Eyelids, conjunctiva, and sclera were normal. Pupils were normal. Cornea, iris, and lens were normal bilaterally.  HEAD, EARS, NOSE, MOUTH, AND THROAT: Head and face were normal. Nose appearance was normal and there was no discharge. Oropharynx was normal.  NECK: Neck appearance was normal. There were no neck masses and the thyroid was not enlarged and no nodules are felt.  No lymphadenopathy.  RESPIRATORY: Breathing pattern was normal and the chest moved symmetrically.  Percussion/auscultatory percussion was normal.  Lung sounds were normal and there were no rales or wheezes.  CARDIOVASCULAR: Heart rate and rhythm were normal.  S1 and S2 were normal and there were no extra sounds or murmurs. Peripheral pulses in arms and legs were normal.  Jugular venous pressure was normal.  There was no peripheral edema.  No carotid bruits.  GASTROINTESTINAL: The abdomen was normal in contour.  Bowel sounds were present.   Palpation detected no tenderness, mass, or enlarged organs.   RECTAL/PROSTATE: No external lesions.  Sphincter tone normal.  No palpable rectal lesions.  Prostate normal size, smooth, nontender without nodules  MUSCULOSKELETAL: Skeletal configuration was " normal and muscle mass was normal for age. Joint appearance was overall normal.  LYMPHATIC: There were no enlarged nodes.  SKIN/HAIR/NAILS: Skin color was normal.  Hair and nails were normal.There were no skin lesions.  NEUROLOGIC: The patient was alert and oriented to person, place, time, and circumstance. Speech was normal. Cranial nerves were normal. Motor strength was normal for age. The patient was normally coordinated.  Sensation intact.  PSYCHIATRIC:  Mood and affect were normal and the patient had normal recent and remote memory. The patient's judgment and insight were normal.    The 10-year ASCVD risk score (Marcelo MICHAEL Jr, et al., 2013) is: 13.5%    Values used to calculate the score:      Age: 58 years      Sex: Male      Is Non- : No      Diabetic: No      Tobacco smoker: No      Systolic Blood Pressure: 140 mmHg      Is BP treated: Yes      HDL Cholesterol: 38 mg/dL      Total Cholesterol: 255 mg/dL         Medication List          These changes are accurate as of 7/23/18  3:27 PM.  If you have any questions, ask your nurse or doctor.               CONTINUE taking these medications          aspirin 325 MG EC tablet   INSTRUCTIONS:  TAKE ONE TABLET BY MOUTH DAILY           citalopram 20 MG tablet   Also known as:  celeXA   INSTRUCTIONS:  Take 1 tablet (20 mg total) by mouth daily.           lisinopril-hydrochlorothiazide 10-12.5 mg per tablet   Also known as:  PRINZIDE,ZESTORETIC   INSTRUCTIONS:  Take 1 tablet by mouth daily. Follow-up appointment needed for future refills             STOP taking these medications          ibuprofen 200 MG tablet   Also known as:  ADVIL,MOTRIN   Stopped by:  Long Weston MD             Additional Screenings Completed Today:

## 2021-06-19 NOTE — LETTER
Letter by Long Weston MD at      Author: Long Weston MD Service: -- Author Type: --    Filed:  Encounter Date: 8/8/2019 Status: (Other)         Nick Coley  [address] [place] [address]             August 8, 2019         Dear Nick,    Below are the results from your recent visit:    Resulted Orders   PSA (Prostatic-Specific Antigen), Annual Screen   Result Value Ref Range    PSA 0.5 0.0 - 3.5 ng/mL    Narrative    Method is Abbott Prostate-Specific Antigen (PSA)  Standard-WHO 1st International (90:10)   Lipid Cascade   Result Value Ref Range    Cholesterol 288 (H) <=199 mg/dL    Triglycerides 347 (H) <=149 mg/dL    HDL Cholesterol 39 (L) >=40 mg/dL    LDL Calculated 180 (H) <=129 mg/dL    Patient Fasting > 8hrs? Yes    Comprehensive Metabolic Panel   Result Value Ref Range    Sodium 141 136 - 145 mmol/L    Potassium 4.1 3.5 - 5.0 mmol/L    Chloride 105 98 - 107 mmol/L    CO2 26 22 - 31 mmol/L    Anion Gap, Calculation 10 5 - 18 mmol/L    Glucose 91 70 - 125 mg/dL    BUN 15 8 - 22 mg/dL    Creatinine 0.98 0.70 - 1.30 mg/dL    GFR MDRD Af Amer >60 >60 mL/min/1.73m2    GFR MDRD Non Af Amer >60 >60 mL/min/1.73m2    Bilirubin, Total 0.6 0.0 - 1.0 mg/dL    Calcium 9.9 8.5 - 10.5 mg/dL    Protein, Total 7.5 6.0 - 8.0 g/dL    Albumin 4.0 3.5 - 5.0 g/dL    Alkaline Phosphatase 50 45 - 120 U/L    AST 81 (H) 0 - 40 U/L     (H) 0 - 45 U/L    Narrative    Fasting Glucose reference range is 70-99 mg/dL per  American Diabetes Association (ADA) guidelines.   Urinalysis   Result Value Ref Range    Color, UA Yellow Colorless, Yellow, Straw, Light Yellow    Clarity, UA Clear Clear    Glucose, UA Negative Negative    Bilirubin, UA Negative Negative    Ketones, UA Negative Negative    Specific Gravity, UA 1.020 1.005 - 1.030    Blood, UA Large (!) Negative    pH, UA 5.5 5.0 - 8.0    Protein, UA Trace (!) Negative mg/dL    Urobilinogen, UA 0.2 E.U./dL 0.2 E.U./dL, 1.0 E.U./dL    Nitrite, UA Negative  "Negative    Leukocytes, UA Trace (!) Negative    Bacteria, UA Few (!) None Seen hpf    RBC, UA 3-5 (!) None Seen, 0-2 hpf    WBC, UA 0-5 None Seen, 0-5 hpf    Squam Epithel, UA 0-5 None Seen, 0-5 lpf    Mucus, UA Few (!) None Seen lpf   Vitamin D, Total (25-Hydroxy)   Result Value Ref Range    Vitamin D, Total (25-Hydroxy) 15.9 (L) 30.0 - 80.0 ng/mL    Narrative    Deficiency <10.0 ng/mL  Insufficiency 10.0-29.9 ng/mL  Sufficiency 30.0-80.0 ng/mL  Toxicity (possible) >100.0 ng/mL   HIV Antigen/Antibody Screening Cascade   Result Value Ref Range    HIV Antigen / Antibody Negative Negative    Narrative    Method is Abbott HIV Ag/Ab for the detection of HIV p24 antigen, HIV-1 antibodies and HIV-2 antibodies.       Your cholesterol is severely elevated.  This significantly increases your risk for future cardiovascular disease and I think you would benefit from taking a statin to help lower your cholesterol.  I have included a prescription for rosuvastatin that is taken once daily.    Your vitamin D level remains surprisingly low.  Are you taking the 50,000 unit dose of vitamin D every week faithfully?    Liver enzymes remain elevated.  This is consistent with \"fatty liver\".  Lowering your cholesterol and losing weight will bring these under control.    The PSA is reassuring for no prostate cancer.  Nothing new on urinalysis.  No diabetes with fasting glucose of 91.  Normal kidney function.    Please call with questions or contact us using [employer].    Sincerely,        Electronically signed by Long Weston MD       "

## 2021-06-19 NOTE — LETTER
Letter by Long Weston MD at      Author: Long Weston MD Service: -- Author Type: --    Filed:  Encounter Date: 6/11/2019 Status: (Other)         Nick Coley  [address] [place] [address]             June 11, 2019         Dear Nick,    Below are the results from your recent visit:    Resulted Orders   Basic Metabolic Panel   Result Value Ref Range    Sodium 142 136 - 145 mmol/L    Potassium 4.3 3.5 - 5.0 mmol/L    Chloride 109 (H) 98 - 107 mmol/L    CO2 23 22 - 31 mmol/L    Anion Gap, Calculation 10 5 - 18 mmol/L    Glucose 100 70 - 125 mg/dL    Calcium 9.6 8.5 - 10.5 mg/dL    BUN 14 8 - 22 mg/dL    Creatinine 0.99 0.70 - 1.30 mg/dL    GFR MDRD Af Amer >60 >60 mL/min/1.73m2    GFR MDRD Non Af Amer >60 >60 mL/min/1.73m2    Narrative    Fasting Glucose reference range is 70-99 mg/dL per  American Diabetes Association (ADA) guidelines.   Hemoglobin   Result Value Ref Range    Hemoglobin 14.8 14.0 - 18.0 g/dL       No anemia.  No diabetes with glucose 100.  Normal kidney function.    Please call with questions or contact us using [employer].    Sincerely,    Electronically signed by Long Weston MD

## 2021-06-20 NOTE — PROGRESS NOTES
Patient here for consult regarding non-surgical weight loss. Initial labs ordered today and patient instructed to do as soon as possible.  Helped to set up future appointments.  Measurements and photos also taken today.  Patient verbalized understanding at this time without any questions.      Shirlene Vasquez RN, CBN  [facility]  [phone]

## 2021-06-20 NOTE — PROGRESS NOTES
"[facility]:  Non-Surgical Weight Loss Intake Appointment   Date of visit: 9/10/2018  Physician: Irvin Branch MD  Primary Care is Long Weston MD.  Nick Coley   58 y.o.  male  Nick is a 58 y.o. year old male who is here today for consultation regarding non-surgical weight loss.   he was referred to the [facility] by his PCP, Dr. Weston.    Weight History:   Wt Readings from Last 3 Encounters:   09/10/18 (!) 345 lb (156.5 kg)   07/23/18 (!) 338 lb (153.3 kg)   06/26/18 (!) 335 lb (152 kg)     Body mass index is 53.24 kg/(m^2).       Assessment and Plan   Assessment: Nick Coley is a 58 y.o.,male presenting for assistance with medical weight loss.  Identified issues contributing to his excess weight include:     Mr. 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      His home is quite chaotic with both a  center and teenagers/granddaughter in the home with a possible second adoptions on the way as well.  This leads to high snack food access and people will often eat his \"healthy food\".     His exercise is minimal.      Plan:  1.  Behavior Goals: 3 daily meals plan, ideally with a large breakfast, medium lunch        and smaller dinner. Avoidance of sugared-sweetened beverages and processed        carbohydrate snacks.  Work towards pre-planning meals to avoid falling back into old             habits/obesogenic habits.  Daily Food Tracking and morning weight recommended.  [facility].    2.  Diet Goals: Recommend a protein goal to start of " at least 90g/day with Calorie daily restriction.  Increased protein intake to insure at        least 20-30 grams of protein per meal.      3.  Exercise/Activity Goals: start walking/couch to 5k.  Long term goal of increasing endurance to allow for at least            200 minutes weekly of moderate exercise to maintain weight loss.      4.  Recommendation regarding weight loss medication:  Start of naltrexone: half tab titrated to two times a day dosing with meals. Given HTN, not a candidate for phentermine and given Celexa use, would avoid Contrave/bupropion as well as Lorcaserin/Belviq. If findings for insulin resistance could consider addition of metformin/Liraglutide depending on cost/coverage.    5.  Referral to Dietician for further education and customization of diet plan.    6.  Stress Reduction: will need to manage schedule, walking program on treadmill and discuss with his family about his need for his own eating/food.    7.  Intake Labs:  ordered    8.   HTN: irregular use of meds, elevated today without symptoms. Weight loss/diet change may help.    9. Venostasis changes of the legs: diet change/weight loss should help.    10. Hyperlipidemia: diet change/exercise change should help.    11. Excessive daytime sleepiness is likely multifactorial and his lack of sleep duration as well as risks for ANAM (STOPBANG of 7).  Will get set up for sleep study to assess further and optimize sleep hygiene. 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    12. Presumptive fatty liver disease given hx of LFT elevation in the past w/ some increased ferritin in the 300s. Weight loss/diet change should reduce risk of cirrhosis developing. Counseling on considering the bariatric surgery tool given his BMI in the 50s along with signs of end organ damage already and the need for at least 100 lbs of weight  loss to get him even close to a BMI under 30. He's contemplative and could view our online seminar for more information on the surgical option. Too much portal hypertension in the future if liver disease unchecked, could exclude him for surgical means of weight loss.    1. Morbid obesity with BMI of 50.0-59.9, adult (H)  naltrexone (DEPADE) 50 mg tablet    Vitamin D, Total (25-Hydroxy)    Vitamin B12    Glycosylated Hemoglobin A1c    Thyroid Stimulating Hormone (TSH)    Split Night Sleep Study    Amb Referral to Bariatric Dietician   2. HTN (hypertension)  Split Night Sleep Study   3. Hyperlipidemia     4. Low HDL (under 40)     5. Daytime sleepiness  Split Night Sleep Study          No Follow-up on file.     Weight and Lifestyle History    Sleep history::Has 34yo, 31yo and 28 yo, 18 and 17 yo children and 3yo granddaughter living with them (16 and 17 yo live with him). 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 Will affect sleep into Monday day job.  Goes to bed usually around 10pm and 11pm. If home will drift off watching TV or sports. Gets up and frequently in the night, 15-20 times a night will register grabbing a falling pillow.   Weekdays wakes at 4:45am, once awake, gets ready and heads out the door without eating. Heads to work 5:30, 20 minutes commute, Cafeteria opens at 7am, eats around 7:30 to 8am or so, will often get a breakfast (eggs w/ some side dishes mostly, potato), water is his beverage. Sometimes will skip lunch and if he does will eat a little later just before 9am and then gets through the day. Will eat a lunch 2 days weekly, cafeteria or left overs. Lunch is 12:30 to 1pm.  Once done at 2pm, heads home, wife does childcare in the home would have some kid food if no lunch: soup/mac and cheese.   Wed/Thurs often. Dinner is everyone for  themselves: hamburgers, spaghetti, pork chops.   Some soda in the home, kids mostly drink it but he'll rarely have a root beer.  Unwinds in the evening/TV w/ his wife. Tries not to eat past 6pm. Has lost some interest/energy for kids events and his own coaching/playing of hockey/softball.    Hours per night: see above, Mondays may be severely limited  Snoring/apnea/insomnia? yes  Restful/refreshed? no  Shift work? In the past, had a fair amount of weight gain. Now day job mostly but with events/long hours on weekends.  CPAP/BiPAP? no  Sleep study previously? no  TV in bedroom? yes  Sleep aids/pills? no      STOP-BANG score for sleep apnea : 7  For general population   ANAM - Low Risk : Yes to 0 - 2 questions  ANAM - Intermediate Risk : Yes to 3 - 4 questions  ANAM - High Risk : Yes to 5 - 8 questions  or Yes to 2 or more of 4 STOP questions + male gender  or Yes to 2 or more of 4 STOP questions + BMI > 35kg/m2  or Yes to 2 or more of 4 STOP questions + neck circumference 17 inches / 43cm in male or 16 inches / 41cm in female    Weight History:  In what way is your excess weight affecting your wellness/health? Tired and loss of interest.  Heaviest weight: current weights.  Any Previous weight loss, what was the most lost and method: none.  Birth weIight, High School graduation weight: 180 lbs.  Lowest adult weight: steady gain.  Maternal health/smoking/weight: na  When did you start gaining weight and what were the circumstances? Shift work/night shifts in the past.  Is anyone else in your immediate family overweight? na  Finally,  Is there a weight you desire to be, what is your goal weight that would define successful weight loss for you? Being consistent.   I would like to lose weight so I can  :  Move easier   .     Barriers to weight loss:  Is anyone else at home/work/family a barrier to your weight loss? Schedule and family will demand certain food or eat his own food.  Work schedule/location? See above  Current  stressors include: home is busy.  Mobility problems: no    Counseled on health benefits of weight loss, goals for metabolic/diabetic risk reduction, HTN reduction, improved sleep and mobility, cancer reduction, longevity.    Fitness History:  Were you ever an athlete?yes     Which sports? Softball/hockey  When was the last time you walked/hiked a mile?na  Do you have any limitations for activity?no  Do you have access to a gym, pool, club, fitness center, or ?treadmill at home                Do you have any fitness goals/dreams that could motivate your weight loss?feeling better.    On a scale from 0-10,  how willing are you to start some sort of movement/exercise regimen? na    Counseled on physiologic benefits of exercise and for long term weight maintenance.    Food History:  Who buys groceries?  He does  Do you eat at dinner table, is the TV on or off, family present? Often on his own.  Any soda, how much? Rare root beer.  Meals per day? See aabove  Snacks per day? See above  Breakfast typically is: see above  Lunch typically is: see above  Dinner  is: see above  Weekly Fast Food/dining out: often at work 2-3 meals.  Snacks consist of: na    Food sensitivities/allergies/intolerances/avoidances: none  Water per day? Has  oz often.    Any binging behavior?no  Any induced vomiting/purging? no  Any history of anorexia/bulimia? no  Any night eating issues? no  Stress induced eating? Rare.     Goal Setting:  Short Term Goals under 310 lbs.  Long Term Goals TBD, under 270 lbs would be excellent weight loss.         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      Family History   Problem Relation Age of Onset     Adopted: Yes     Diabetes Mother      Anesthesia problems Neg Hx           Past Medical History   Patient Active Problem List   Diagnosis     Essential hypertension     Abnormal LFTs (liver function tests)     Morbid obesity (H)     LBP (low  "back pain)     Hyperlipidemia     Nephrolithiasis     Depression with anxiety     Irritable bowel syndrome     Hematoma of leg, left, subsequent encounter     Microscopic hematuria     Other allergy (see comments)  Current Outpatient Prescriptions   Medication Sig     aspirin 325 MG EC tablet TAKE ONE TABLET BY MOUTH DAILY     citalopram (CELEXA) 20 MG tablet Take 1 tablet (20 mg total) by mouth daily.     lisinopril-hydrochlorothiazide (PRINZIDE,ZESTORETIC) 10-12.5 mg per tablet Take 1 tablet by mouth daily. Follow-up appointment needed for future refills       Past Surgical History  He has a past surgical history that includes Carpal tunnel release (Right, 2001); Extracorporeal shock wave lithotripsy (2013); pr knee scope,single menisectomy (Right, 12/22/2015); and Rule tooth extraction (2013).     Examination   BP (!) 171/97  Pulse 71  Temp 97.9  F (36.6  C)  Resp 18  Ht 5' 7.5\" (1.715 m)  Wt (!) 345 lb (156.5 kg)  SpO2 95%  BMI 53.24 kg/m2  Height: 5' 7.5\" (1.715 m) (9/10/2018  8:02 AM)  Weight: 345 lb (156.5 kg) (9/10/2018  8:02 AM)  BMI (Calculated): 53.2 (9/10/2018  8:02 AM)  SpO2: 95 % (9/10/2018  8:02 AM)  General:  Alert and ambulatory, appears fatigued/tired.  HEENT:  No conjunctival pallor, moist mucous Membranes, neck is thick. No nodules  Pulmonary:  Normal respiratory effort, no cough, no audible wheezes/crackles.  CV:  Regular rate and Rhythm, no murmurs, pulses 2 plus  Abdominal: central adiposity, non tender in RUQ (hx of steatohepatitis changes of LFTs), no rash.   Extremities: trace ankle edema and some hyperpigmentation/venostasis changes of legs w/ healing contusion/abraision to left pretibial area.  Skin:  No pallor or jaundice  Pscyh/Mood: motivated, happy and willing to adjust habits.                                    LABS: ordered    Last recorded labs include:  Lab Results   Component Value Date    WBC 6.9 10/20/2016    HGB 15.5 06/20/2018    HCT 47.3 10/20/2016    MCV 87 " 10/20/2016     10/20/2016      No results found for: VXRCCGIS02ZE No results found for: HGBA1C   Lab Results   Component Value Date    CHOL 255 (H) 2018    No results found for: PTH      Lab Results   Component Value Date    FERRITIN 336 (H) 2015      Lab Results   Component Value Date    HDL 38 (L) 2018      No results found for: YVINYNCT69 No results found for: 72218   Lab Results   Component Value Date    LDLCALC 166 (H) 2018    No results found for: TSH No results found for: FOLATE   Lab Results   Component Value Date    TRIG 256 (H) 2018    Lab Results   Component Value Date    ALT 71 (H) 2018    AST 44 (H) 2018    ALKPHOS 47 2018    BILITOT 0.6 2018    No results found for: TESTOSTERONE     No components found for: CHOLHDL No results found for: 7597   @resusfast(vitamin a: 1)@       Other Notables/imaging:     Counselin minutes spent in direct consultation with the patient regarding conditions contributing to excess weight accumulation, with over 50% of the time spent in counseling, goal setting and initiating a plan to lose their excess weight.    Irvin Branch MD  [facility].  9/10/2018

## 2021-06-20 NOTE — PROGRESS NOTES
"Non-surgical Weight Loss Initial Diet Evaluation     Assessment:  Pt is a 58 y.o. male being seen today for non-surgical RD nutritional evaluation. Today we reviewed current eating habits and level of physical activity, and instructed on the changes that are required for successful weight loss outcomes.    Personal Goals: Pt would like to lose weight and feel better; get on track with nutrition habits  -pt is now working a regular shift  -does not always take meds, states he is a \"picky eater\"     Pt Active Problem List Diagnosis:     Patient Active Problem List   Diagnosis     Essential hypertension     Abnormal LFTs (liver function tests)     Morbid obesity (H)     LBP (low back pain)     Hyperlipidemia     Nephrolithiasis     Depression with anxiety     Irritable bowel syndrome     Hematoma of leg, left, subsequent encounter     Microscopic hematuria     Phentermine: 1 full tab daily (1/2 in AM and 1/2 in PM)     Pt's Initial Weight: 345 lbs  Weight: 337 lb (152.9 kg)  Weight loss from initial: 8  % Weight loss: 2.32 %  BMI: Body mass index is 52 kg/(m^2).  IBW: 148 lbs    Estimated RMR ([place]-St Jeor equation): 2330 calories  Protein requirements (.5grams to .9grams per pound IBW, 20-30% of calories, minimum of 60-80gm per day):   grams     Food allergies, intolerances, Islam customs: none    Vitamins/Mineral Supplementation: none    Biggest struggle with weight loss:schedule and knowing what to eat    Who does the grocery shopping for your household? Self   Who prepares your meals at home? Self and Wife  -lives with wife (in home ) and 3 children    6am-2pm   Diet Recall/Time: wakes at 430am  Breakfast: skips   Am Snack: if he likes what is at work (eggs benedict, eggs and toast with meat)  Lunch: cheeseburger, BLT, grilled cheese with soup - all at work and sometimes will skip   Pm snack: occasional 90 kcal granola OR SF jello OR oatmeal  Dinner: Pizza, sandwich (no real routine) - pizza, " Pro/CHO   HS Snack: does no eat past 6pm     Fats used at home: none    Meals per week away from home: eating at restaurants 3-4X/week     Recommended limiting eating out to no more than 2x/week.  Patient and I reviewed the importance of eating three consistent meals per day; as well as meal timing to be spaced 4-5 hours apart.  Snack choices: 100-150 calories (1-2x/day if physically hungry), incorporating a fruit/vegetable w/ protein source.    Portion Sizes problematic? no per patient/diet recall  Encouraged slowing meal times down, 20-30 minutes, chewing to applesauce consistency.   To aid in proper portion control and slow meal time down discussed consuming meals off smaller plates, use toddler/children utensils and set utensils down after each bite.    Protein, vegetables/fruits, carbohydrates:   Reviewed lean protein sources today. Recommended consuming 25-30gm protein at 3 meals daily.  The patient and I discussed the importance of including lean/low fat protein at each meal and limiting carbohydrate intake to less than 25% of plate volume.     Beverages (Type/Oz. per day)  Water: 80-120oz  Coffee: none  Tea: none  Milk: 3-4X/week   Regular soda: rare  Diet soda: none  Juice: none  Phong-Aid/lemonade/etc: none  Alcohol: summer softball - a few after games    Discussed the importance of adequate hydration and the goal of 64+ oz of fluid daily.   The patient understands the importance of avoiding all alcoholic and sweetened drinks, and instead choosing 64 oz plain water.    Exercise  Limited with movement - plays 1X/week softball    Pt's understands that 45-60 minutes of daily activity is an important part of weight loss success.   Encouraged pt to incorporate upper body strength training exercise, even if its lifting soup cans while watching tv at night, doing push ups/sit-ups, and abdominal work.    PES statement:    1. (NI-1.3)Excessive energy intake related to Food and nutrition related knowledge deficit  concerning excessive energy/oral intake as evidenced by Intake of high caloric density foods at meals and/or snacks; large portions; frequent grazing; Estimated intake that exceeds estimated daily energy intake; Frequent fast food or restaurant intake; and BMI 52.00    2. (NC-3.3.5) Obese, class III, BMI ?40 related to physical inactivity as evidenced by Infrequent, low-duration and or low intensity physical activity; and Large amounts of sedentary activities; no structured physical activity regimen     Intervention  Discussion:  1. Educated pt on Eat Better, Move More, Live Well: Non-surgical Weight Loss Handout  2. Recommended to consume 25-30gm protein at 3 meals daily.  grams daily total.  Educated pt on food labels: keeping total sugar grams <10.  Instructions/Goals:   1. Include 25-30gm protein at each meal.  2. Increase vegetable/fruit intake, by having a vegetable or fruit with each meal daily. Recommended pt to increase vegetable/fruit intake to 4-5 servings daily.  3. Increase fluid intake to 64oz daily: choose plain or calorie/alcohol-free beverages.  4. Incorporate daily structured activity, 45-60 minutes most days of the week  5. Read food labels more consistently: keeping total fat grams <10, total sugar grams <10, fiber >3gm per serving.  6. Practice plate method: 1/2 plate lean/low fat protein source, vegetable/fruit, <25% of plate complex carbohydrates.  7. Practice eating off of smaller plates/bowls, chewing to applesauce consistency, taking 20-30 minutes to eat in a calm/relaxed environment without distractions of tv/email/cell phone.    Handouts Provided:  Eat Better, Move More, Live Well: Non-surgical Weight Loss Handout  Protein Supplement List    Monitor/Evaluation:    Pt will f/u in one month with bariatrician, and f/u in two months with RD.    Plan for next visit with RD:  GOALS:  1) start with protein drink at breakfast    Time In: 8:00a  Time Out: 9:00a    ABN signed: Yes

## 2021-06-21 NOTE — PROGRESS NOTES
Preoperative Exam    Scheduled Procedure: Colonoscopy  Surgery Date:  11/21/2018  Surgery Location: [facility] fax [phone]    Surgeon:  Dr Albright    Assessment/Plan:     1. Encounter for preoperative examination for general surgical procedure  No contraindications to proceeding with colonoscopy under general anesthesia.  He has probable sleep apnea and he should be monitored carefully for hypoxemia.  Avoid aspirin and NSAIDs for 1 week prior to procedure.  - Basic Metabolic Panel  - Electrocardiogram Perform - Clinic    2. Screening for colon cancer      3. Essential hypertension  Blood pressure remains mildly elevated.  Continues on lisinopril/HCTZ.  Lifestyle modification discussed including efforts at weight loss and sodium restriction.  Will increase dose to 20/25 mg at follow-up if still not at goal.        5. Suspected sleep apnea  Encouraging him to follow through with sleep study as recommended.    6. Depression with anxiety  Remains on citalopram.  PHQ 9 score is 5    7. Morbid obesity with BMI of 50.0-59.9, adult (H)   Evaluated at [facility] bariatric center and currently pursuing nonsurgical interventions but we discussed the role and benefits of surgery.  Urging him to consider if unable to reach his weight loss goals over the next 6 months.  - Vitamin D, Total (25-Hydroxy)  - Vitamin B12  - Glycosylated Hemoglobin A1c  - Thyroid Stimulating Hormone (TSH)        Surgical Procedure Risk: Low (reported cardiac risk generally < 1%)  Have you had prior anesthesia?: Yes  Have you or any family members had a previous anesthesia reaction:  No  Do you or any family members have a history of a clotting or bleeding disorder?: No  Cardiac Risk Assessment: no increased risk for major cardiac complications    Patient approved for surgery with general or local anesthesia.        Functional Status: Independent  Patient plans to recover at home with family.     Subjective:      Nick Coley is a 58 y.o. male who  presents for a preoperative consultation.  Scheduled for screening colonoscopy under general anesthesia next week.  He has had no problems with previous surgeries or anesthesia.  No history of coronary artery disease or chronic respiratory problems and no exertional chest pain or dyspnea.    He has suspected sleep apnea but has not completed workup.  We discussed his current treatment for morbid obesity at [facility].  Started on naltrexone.  He is very reluctant to pursue any surgical interventions.    All other systems reviewed and are negative, other than those listed in the HPI.    Pertinent History  Do you have difficulty breathing or chest pain after walking up a flight of stairs: No  History of obstructive sleep apnea: No  Steroid use in the last 6 months: No  Frequent Aspirin/NSAID use: Yes: Aspirin 325 mg daily  Prior Blood Transfusion: No  Prior Blood Transfusion Reaction: No  If for some reason prior to, during or after the procedure, if it is medically indicated, would you be willing to have a blood transfusion?:  There is no transfusion refusal.    Current Outpatient Medications   Medication Sig Dispense Refill     aspirin 325 MG EC tablet TAKE ONE TABLET BY MOUTH DAILY 90 tablet 3     citalopram (CELEXA) 20 MG tablet Take 1 tablet (20 mg total) by mouth daily. 30 tablet 2     lisinopril-hydrochlorothiazide (PRINZIDE,ZESTORETIC) 10-12.5 mg per tablet Take 1 tablet by mouth daily. Follow-up appointment needed for future refills 90 tablet 0     naltrexone (DEPADE) 50 mg tablet Start half tab daily for 10 days then increase to half tab twice daily (breakfast and supper). 60 tablet 0     No current facility-administered medications for this visit.         Allergies   Allergen Reactions     Other Allergy (See Comments) Rash     Silk tape not paper tape       Patient Active Problem List   Diagnosis     Essential hypertension     Abnormal LFTs (liver function tests)     Morbid obesity (H)      LBP (low back pain)     Hyperlipidemia     Nephrolithiasis     Depression with anxiety     Irritable bowel syndrome     Microscopic hematuria     Suspected sleep apnea       Past Medical History:   Diagnosis Date     Abnormal LFTs (liver function tests) 12/18/2015    Negative hepatitis C antibody and ferritin of 330 6 December 2015, steatohepatitis suspected     Cellulitis, face 2014     Depression with anxiety      Essential hypertension 12/18/2015     Hematoma of leg, left, subsequent encounter 7/23/2018     Hyperlipidemia      Irritable bowel syndrome      Kidney stones     x 4     LBP (low back pain)      Microscopic hematuria 7/23/2018     Morbid obesity (H) 12/18/2015     Nephrolithiasis     2013, 2009, 2001     Suspected sleep apnea 11/16/2018     Tear of meniscus of right knee 12/18/2015    Associated with right tibial plateau fracture 2013, arthroscopic knee surgery December 2015     Wrist fracture        Past Surgical History:   Procedure Laterality Date     CARPAL TUNNEL RELEASE Right 2001     EXTRACORPOREAL SHOCK WAVE LITHOTRIPSY  2013    for kidney stones     IN KNEE SCOPE,SINGLE MENISECTOMY Right 12/22/2015    Procedure: RIGHT KNEE ARTHROSCOPIC MENISCECTOMY;  Surgeon: Chandu Moyer MD;  Location: [facility] OR;  Service: Orthopedics     WISDOM TOOTH EXTRACTION  2013       Social History     Socioeconomic History     Marital status:      Spouse name: Not on file     Number of children: Not on file     Years of education: Not on file     Highest education level: Not on file   Social Needs     Financial resource strain: Not on file     Food insecurity - worry: Not on file     Food insecurity - inability: Not on file     Transportation needs - medical: Not on file     Transportation needs - non-medical: Not on file   Occupational History     Not on file   Tobacco Use     Smoking status: Never Smoker     Smokeless tobacco: Never Used     Tobacco comment: cigar once or twice a year   Substance  "and Sexual Activity     Alcohol use: Yes     Comment: just during softball season or vacation     Drug use: No     Sexual activity: Yes     Partners: Female   Other Topics Concern     Not on file   Social History Narrative    , 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   Lung sounds were normal and there were no rales or wheezes.  CARDIOVASCULAR: Heart rate and rhythm were normal.  S1 and S2 were normal and there were no extra sounds or murmurs. Peripheral pulses in arms and legs were normal.  Jugular venous pressure was normal.  There was no peripheral edema.  No carotid bruits.  GASTROINTESTINAL: The abdomen was normal in contour.  Bowel sounds were present.   Palpation detected no tenderness, mass, or enlarged organs.   SKIN/HAIR/NAILS: No cyanosis or pallor  NEUROLOGIC: Grossly nonfocal  PSYCHIATRIC:  Mood and affect were normal     Patient Instructions     Hold all supplements, aspirin and NSAIDs for 7 days prior to surgery.  Follow your surgeon's direction on when to stop eating and drinking prior to surgery.  Do not take your usual medications including lisinopril/HCTZ on the morning of surgery      EKG: Normal sinus rhythm with nonspecific poor R wave progression across anterior leads, previously seen on last EKG December 2015.  No significant ST or T wave abnormality.    Labs:  Potassium 4.3 creatinine 0.98    Immunization History   Administered Date(s) Administered     DTaP, historic 12/23/2013     Daniel, adult adsorbed, PF 07/23/2018     Tdap 11/18/2008           Electronically signed by Long Weston MD 11/16/18 3:43 PM    "

## 2021-06-21 NOTE — PROGRESS NOTES
Low D on intake bariatric labs. 4 months of weekly D2, 50,000IUs sent then once completed would recommend 2000-4000IUs daily of D3 thereafter.  Irvin Branch MD

## 2021-06-25 NOTE — TELEPHONE ENCOUNTER
Refill Request  Medication name:   Requested Prescriptions     Pending Prescriptions Disp Refills     lisinopriL (PRINIVIL,ZESTRIL) 20 MG tablet 60 tablet 0     Sig: Take once daily.     Who prescribed the medication: Robel  Last refill on medication: 03/19/21  Requested Pharmacy: [facility]  Last appointment with PCP: 06/04/21  Next appointment: Not due    Ashley Baker RT (R)

## 2021-06-25 NOTE — PROGRESS NOTES
Time in: 1:00p /Time out: 2:00p   Pt's No data recorded  BMI: There is no height or weight on file to calculate BMI.    Pt presents for nutrition education class for liquid diets. Educated pt on 2-week pre-op and 1-week post-op liquid diets. Discussed appropriate liquids and demonstrated portions for each of the food groupings during each diet phase. Reviewed appropriate calories/protein/fluid goals during 2-week pre-op liquid diet. Educated on correct vitamins/minerals to take after surgery in correct dosage and frequency. Provided grocery list and sample menu plan for each diet stage, as well as unflavored protein powder samples.        Pt will begin 2-week pre-op liquid diet 6/1/21, will do clear liquids the day before surgery. Pt is scheduled for LSG on 6/15/21, and will then follow 2 week post-op liquid diet. Pt will f/u with RD 1-week post-op for further diet advancement.    Chioma Clark, KELLY, LD

## 2021-06-25 NOTE — TELEPHONE ENCOUNTER
Discussed his concerns about stones after surgery (gall and kidney). Sleeve is lower risk for kidney stones. Adequate hydration stress and to contact us if falling behind that first month for outpatient infusions as needed to maintain good urine output around 2L/day. 90%uric acid stone, 10% calcium stone content on stone analysis in the past. Multivitamin twice daily should provide good amount of vitamin C, aiming for 100-200mg/day.   During liquid diet and first month postop when food options limited, will replace his lisinopril-hydrochlorothiazide (20-25) with just lisinopril to reduce hypokalemic risks. Rx X 60tabs sent today.  Irvin Branch MD

## 2021-06-26 NOTE — TELEPHONE ENCOUNTER
Post-Op Phone Call  [facility]    Surgeon: Aubrey Olmos M.D.  Date of Surgery: 6/15/2021  Discharge Date: 6/16/2021    Date/Time Called:   Date: 6/18/2021 Time: 2:41 PM   Attempt: First    Patient unavailable, message left to call [facility] with questions/problems? No    Pain Control:  Intensity: No Pain (0)  Duration/Location/Explain:   What makes it better/worse?     Medications:  Narcotic Use - No  Drug type: Gabapentin  Frequency: every 8 hours    Non-prescription pain control: Tylenol every 6 hours.    Other medications currently taking: see med list    Complete Multivitamin + Iron BID? Yes    Vitamin B12? sublingual daily    Incisions:  Drainage? clean and dry  Comment: Steri strips on.    Intake/Output:  Fluid Intake(oz/day)? 50-60 oz  Fluid type? Water, dilute juice    Heartburn? No  Counseling: Omeprazole daily  Nausea? No  Vomiting? No  Explain:     Voiding Frequency? 4 or more/day     Voiding-Color/Amount? Good.    Flatus? Yes    Bowel Movement?Yes     Are you using your incentive spirometer? If yes, how often? 3+/day    Any fever type symptoms? No  Explain:     Walking activity?   Frequency/Type: walking around the house.    In Preparation for Surgery:  On a scale of 1-5, with 5 being the highest, how well did the pre-op class prepare you for what actually happened in the hospital? 5  If you were unable to give us a 5, what could we have done to earn a 5?     Is there anything that you wish you would have known prior to surgery that you did not know? If yes, what? No.    On a scale of 1-5, with 5 being the highest, how was the service while you were in the hospital? 5  If you were unable to give us a 5, what could we have done to earn a 5?     Is/was there anyone in particular; nurse, aide, hospital staff, that did a great job and you would like us to recognize? If yes, whom. ROB Rizvi  What did they do? She was fun, they were joking together.    Would you recommend [facility]  to others? Yes  If no, can you explain why?     Would you recommend [facility] to others?Yes  If no, can you explain why?      Thank you for your time. Please do not hesitate to call us with any questions or concerns.    Call completed by:   Hannah Davis RN, CBN  [facility]  P [phone]  F [phone]

## 2021-06-26 NOTE — ANESTHESIA PREPROCEDURE EVALUATION
Anesthesia Evaluation      Patient summary reviewed     Airway   Mallampati: II  Neck ROM: full   Pulmonary - normal exam   (+) sleep apnea,                          Cardiovascular - normal exam  (+) hypertension, ,      Neuro/Psych      Endo/Other    (+) obesity,      GI/Hepatic/Renal    (+)   chronic renal disease,           Dental - normal exam                        Anesthesia Plan  Planned anesthetic: general endotracheal and ITN    ASA 3   Induction: intravenous   Anesthetic plan and risks discussed with: patient    Post-op plan: routine recovery

## 2021-06-26 NOTE — PATIENT INSTRUCTIONS - HE

## 2021-06-26 NOTE — ANESTHESIA CARE TRANSFER NOTE
Last vitals:   Vitals:    06/15/21 0845   BP: 157/82   Pulse: 83   Resp: 19   Temp: 36.9  C (98.5  F)   SpO2: 98%     Patient's level of consciousness is drowsy  Spontaneous respirations: yes  Maintains airway independently: yes  Dentition unchanged: yes  Oropharynx: oropharynx clear of all foreign objects    QCDR Measures:  ASA# 20 - Surgical Safety Checklist: WHO surgical safety checklist completed prior to induction    PQRS# 430 - Adult PONV Prevention: 4558F - Pt received => 2 anti-emetic agents (different classes) preop & intraop  ASA# 8 - Peds PONV Prevention: NA - Not pediatric patient, not GA or 2 or more risk factors NOT present  PQRS# 424 - Gely-op Temp Management: 4559F - At least one body temp DOCUMENTED => 35.5C or 95.9F within required timeframe  PQRS# 426 - PACU Transfer Protocol: - Transfer of care checklist used  ASA# 14 - Acute Post-op Pain: ASA14B - Patient did NOT experience pain >= 7 out of 10

## 2021-06-26 NOTE — ANESTHESIA PROCEDURE NOTES
Spinal Block    Patient location during procedure: OR  Start time: 6/15/2021 7:00 AM  End time: 6/15/2021 7:05 AM  Reason for block: post-op pain management    Staffing:  Performing  Anesthesiologist: Stephon Barbosa MD    Preanesthetic Checklist  Completed: patient identified, risks, benefits, and alternatives discussed, timeout performed, consent obtained, at patient's request, airway assessed, oxygen available, suction available, emergency drugs available and hand hygiene performed  Spinal Block  Patient position: sitting  Prep: ChloraPrep  Patient monitoring: heart rate, cardiac monitor, continuous pulse ox and blood pressure  Approach: midline  Location: L3-4  Injection technique: single-shot  Needle type: pencil-tip   Needle gauge: 24 G    Assessment  Sensory level: T10    Additional Notes:  IT morphine

## 2021-06-26 NOTE — PROGRESS NOTES
FMLA forms received, completed and signed on Dr. Olmos's behalf.    Leave start date: 06/15/2021    Leave end date: 07/05/2021    Pt may RTW with no restrictions.    Dates approved per Dr. Olmos.    Completed forms faxed to: [employer], F: [phone]     Forms sent to HIM for scanning.    Leigh King  [facility]  Surgery Clinic [facility] [place]  [facility] [address] [address]  Office: [phone]  Fax: [phone]

## 2021-06-26 NOTE — PROGRESS NOTES
[facility]  [address] [place] [address]  Dept: [phone]  Dept Fax: [phone]  Primary Provider: Fadi Weston MD  Pre-op Performing Provider: FADI WESTON      PREOPERATIVE EVALUATION:  Today's date: 6/4/2021    Nick Coley is a 61 y.o. male who presents for a preoperative evaluation.    Surgical Information:  Surgery/Procedure: GASTRECTOMY, SLEEVE, LAPAROSCOPIC  Surgery Location: [facility]  Surgeon: Fozia  Surgery Date: 6-  Time of Surgery: 7:30 AM  Where patient plans to recover: At home with family  Fax number for surgical facility: [phone]    Type of Anesthesia Anticipated: General    Assessment & Plan      The proposed surgical procedure is considered INTERMEDIATE risk.    Preop general physical exam  He is cleared to proceed with upcoming gastric bypass surgery under general anesthesia.  Overall risk is low.  No contraindications to proceeding.  We will discontinue aspirin prior to surgery.  - Electrocardiogram Perform and Read - Clinic    Obesity, morbid, BMI 50 or higher (H)  BMI over 50.  Appropriate candidate for gastric bypass surgery.    Moderate major depression (H)  Mood is stable with current dose of citalopram.  PHQ-9 score is 0.  Citalopram should be restarted postoperatively.    ANAM (obstructive sleep apnea)  He wears a CPAP at night and will bring his machine with him to wear during hospitalization    Essential hypertension  Blood pressure well controlled.  Hydrochlorothiazide discontinued but he will continue lisinopril 20 mg daily.  This should be restarted postoperatively unless hypotension develops.    Vitamin D deficiency  He will continue 5000 units of vitamin D daily    Mixed hyperlipidemia  Cholesterol well controlled with rosuvastatin.  This should be restarted postoperatively.             Medication Instructions:  Stop aspirin in anticipation of upcoming gastric bypass surgery    Do not take lisinopril/HCTZ between  now and surgery but you should take lisinopril 20 mg daily until the day of surgery    Hold all of your medications on the morning of surgery including lisinopril    Bring your CPAP with you to the hospital on the day of surgery      RECOMMENDATION:  APPROVAL GIVEN to proceed with proposed procedure, without further diagnostic evaluation.      40 minutes spent on the date of the encounter doing chart review, history and exam, documentation and further activities per the note        Subjective     HPI related to upcoming procedure: 61-year-old male with morbid obesity, hypertension, sleep apnea, and hyperlipidemia who is to have gastric bypass surgery later this month.  Here for preop clearance.  He has tolerated previous surgery and anesthesia without any problems.    No coronary artery disease and no exertional chest pain.    No history of asthma or COPD.  Non-smoker.    Depression is controlled with citalopram.    He does have sleep apnea and uses CPAP at night    No personal or family history of DVT or pulmonary embolus.    Preop Questions 6/1/2021   Have you ever had a heart attack or stroke? No   Have you ever had surgery on your heart or blood vessels, such as a stent placement, a coronary artery bypass, or surgery on an artery in your head, neck, heart, or legs? No   Do you have chest pain with activity? No   Do you have a history of  heart failure? No   Do you currently have a cold, bronchitis or symptoms of other infection? No   Do you have a cough, shortness of breath, or wheezing? No   Do you or anyone in your family have previous history of blood clots? No   Do you or does anyone in your family have a serious bleeding problem such as prolonged bleeding following surgeries or cuts? No   Have you ever had problems with anemia or been told to take iron pills? No   Have you had any abnormal blood loss such as black, tarry or bloody stools? No   Have you ever had a blood transfusion? No   Are you willing to  have a blood transfusion if it is medically needed before, during, or after your surgery? Yes   Have you or any of your relatives ever had problems with anesthesia? No   Do you have sleep apnea, excessive snoring or daytime drowsiness? YES -sleep apnea   Do you have a CPAP machine? Yes   Do you have any artifical heart valves or other implanted medical devices like a pacemaker, defibrillator, or continuous glucose monitor? No   Do you have artificial joints? No   Are you allergic to latex? No     Health Care Directive:  Patient does not have a Health Care Directive or Living Will:       Review of Systems  CONSTITUTIONAL: NEGATIVE for fever, chills, change in weight  ENT/MOUTH: Negative for ear, mouth, and throat problems  RESP: NEGATIVE for significant cough or SOB  CV: NEGATIVE for chest pain, palpitations or peripheral edema  ROS otherwise negative    Patient Active Problem List    Diagnosis Date Noted     Obesity, morbid, BMI 50 or higher (H) 03/16/2021     ANAM (obstructive sleep apnea) 01/05/2021     Abnormal liver function 12/07/2020     History of colon polyps      Moderate major depression (H) 06/10/2019     Chronic tension-type headache, not intractable 06/10/2019     Vitamin D deficiency      Microscopic hematuria 07/23/2018     LBP (low back pain)      Hyperlipidemia      Nephrolithiasis      Irritable bowel syndrome      Essential hypertension 12/18/2015     Morbid obesity (H) 12/18/2015     Past Medical History:   Diagnosis Date     Abnormal LFTs (liver function tests) 12/18/2015    Negative hepatitis C antibody and ferritin of 330 6 December 2015, steatohepatitis suspected     Cellulitis of left lower extremity 5/29/2020     Cellulitis, face 2014     Chronic tension-type headache, not intractable 6/10/2019     Depression with anxiety      Essential hypertension 12/18/2015     Fatty liver     ultrasound 2001 demonstrated.     Hematoma of leg, left, subsequent encounter 7/23/2018     History of colon  polyps     Colonoscopy November 2018 with multiple polyps removed, repeat in 3 years     Hyperlipidemia      Irritable bowel syndrome      Kidney stones     x 4     LBP (low back pain)      Microscopic hematuria 7/23/2018     Moderate major depression (H) 6/10/2019     Morbid obesity (H) 12/18/2015     Nephrolithiasis     2013, 2009, 2001     Suspected sleep apnea 11/16/2018     Tear of meniscus of right knee 12/18/2015    Associated with right tibial plateau fracture 2013, arthroscopic knee surgery December 2015     Vitamin D deficiency     Vitamin D 10.0 November 2018     Wrist fracture      Past Surgical History:   Procedure Laterality Date     CARPAL TUNNEL RELEASE Right 2001     EXTRACORPOREAL SHOCK WAVE LITHOTRIPSY  2013    for kidney stones     OR KNEE SCOPE,SINGLE MENISECTOMY Right 12/22/2015    Procedure: RIGHT KNEE ARTHROSCOPIC MENISCECTOMY;  Surgeon: Chandu Moyer MD;  Location: [facility];  Service: Orthopedics     WISDOM TOOTH EXTRACTION  2013     Current Outpatient Medications   Medication Sig Dispense Refill     aspirin 81 MG EC tablet Take 1 tablet (81 mg total) by mouth daily. 90 tablet 3     citalopram (CELEXA) 20 MG tablet Take 1 tablet (20 mg total) by mouth daily. 90 tablet 3     lisinopriL (PRINIVIL,ZESTRIL) 20 MG tablet Take once daily. 60 tablet 0     MEN'S MULTI-VITAMIN ORAL Take 1 tablet by mouth daily.       rosuvastatin (CRESTOR) 10 MG tablet Take 1 tablet (10 mg total) by mouth daily. 90 tablet 3     cholecalciferol, vitamin D3, 5,000 unit Tab Take by mouth daily.       cyanocobalamin, vitamin B-12, 1,000 mcg Subl Place 1,000 mcg under the tongue daily.       lisinopriL-hydrochlorothiazide (ZESTORETIC) 20-25 mg per tablet Take 1 tablet by mouth daily. 90 tablet 3     [START ON 6/16/2021] omeprazole (PRILOSEC) 20 MG capsule Take 1 capsule (20 mg total) by mouth daily before breakfast. For the first 6 weeks, open capsule & sprinkle contents into liquids or onto food. 90 capsule 0  "    pediatric multivitamin-iron (CEROVITE JR) 18 mg iron- 10 mcg Chew chewable tablet Chew 1 tablet 2 (two) times a day.       [START ON 6/29/2021] ursodioL (ACTIGALL) 300 mg capsule Take 1 capsule (300 mg total) by mouth 2 (two) times a day. Start 2 wks after surgery. Do not cut, crush or open. Take with warm liquids. 180 capsule 1     No current facility-administered medications for this visit.        Allergies   Allergen Reactions     Adhesive Tape-Silicones Rash     Silk tape not paper tape     Other Allergy (See Comments) Rash     Silk tape not paper tape       Social History     Tobacco Use     Smoking status: Never Smoker     Smokeless tobacco: Never Used     Tobacco comment: cigar once or twice a year   Substance Use Topics     Alcohol use: Yes     Comment: just during softball season or vacation      Family History   Adopted: Yes   Problem Relation Age of Onset     Diabetes Mother      Anesthesia problems Neg Hx      Social History     Substance and Sexual Activity   Drug Use No        Objective     /74 (Patient Site: Right Arm, Patient Position: Sitting, Cuff Size: Adult Large)   Pulse 73   Temp 97  F (36.1  C) (Tympanic)   Ht 5' 7.5\" (1.715 m)   Wt (!) 338 lb (153.3 kg)   SpO2 94%   BMI 52.16 kg/m    Physical Exam  GENERAL APPEARANCE: Obese male who otherwise appears healthy, alert and no distress  HENT: Normal  RESP: lungs clear to auscultation - no rales, rhonchi or wheezes  CV: regular rate and rhythm, normal S1 S2, no S3 or S4 and no murmur, click or rub  ABDOMEN: soft, nontender, no HSM or masses and bowel sounds normal  NEURO: Normal strength and tone, sensory exam grossly normal, mentation intact and speech normal        PRE-OP Diagnostics:   Labs-  Hemoglobin 14.2 platelet 214  INR 1.15 PTT 35  Urinalysis unremarkable    Chest x-ray normal        EKG: Normal sinus rhythm rate 72.  No significant ST or T wave abnormality.     I personally reviewed and interpreted his EKG    REVISED " CARDIAC RISK INDEX (RCRI)   The patient has the following serious cardiovascular risks for perioperative complications:   - No serious cardiac risks = 0 points    RCRI INTERPRETATION: 0 points: Class I (very low risk - 0.4% complication rate)         Signed Electronically by: Long Weston MD    Copy of this evaluation report is provided to requesting physician.

## 2021-06-26 NOTE — ANESTHESIA POSTPROCEDURE EVALUATION
Patient: Nick Coley  Procedure(s):  GASTRECTOMY, SLEEVE, LAPAROSCOPIC  Anesthesia type: general    Patient location: PACU  Last vitals:   Vitals Value Taken Time   /80 06/15/21 1030   Temp 36.8  C (98.3  F) 06/15/21 1030   Pulse 77 06/15/21 1030   Resp 16 06/15/21 1030   SpO2 94 % 06/15/21 1030     Post vital signs: stable  Level of consciousness: awake and responds to simple questions  Post-anesthesia pain: pain controlled  Post-anesthesia nausea and vomiting: no  Pulmonary: unassisted, return to baseline  Cardiovascular: stable and blood pressure at baseline  Hydration: adequate  Anesthetic events: no    QCDR Measures:  ASA# 11 - Gely-op Cardiac Arrest: ASA11B - Patient did NOT experience unanticipated cardiac arrest  ASA# 12 - Gely-op Mortality Rate: ASA12B - Patient did NOT die  ASA# 13 - PACU Re-Intubation Rate: ASA13B - Patient did NOT require a new airway mgmt  ASA# 10 - Composite Anes Safety: ASA10A - No serious adverse event    Additional Notes:

## 2021-06-27 NOTE — PROGRESS NOTES
Progress Notes by Long Weston MD at 8/5/2019 10:40 AM     Author: Long Weston MD Service: -- Author Type: Physician    Filed: 8/5/2019 11:37 AM Encounter Date: 8/5/2019 Status: Signed    : Long Weston MD (Physician)       MALE PREVENTATIVE EXAM    Assessment and Plan:       1. Routine general medical examination at a health care facility  Immunizations are reviewed and recommending annual flu shot and Shingrix.  Living will has been discussed.  Non-smoker.  Uses alcohol in moderation.  Regular exercise discussed.  Up to date with colonoscopies and this should be repeated in November 2021.  Prostate exam is normal and I will check a PSA for prostate cancer screening.  Recommending annual eye exam.  Skin exam performed and recommending regular use of sunblock.  Hepatitis C antibody for screening was normal 2015.  Will screen for diabetes with fasting glucose.       2. Morbid obesity (H)  Encouraging more regular exercise.  He has exercise equipment available at home and is also thinking about joining a gym.  He is started to make changes in diet and will continue.  His weight is down 2 pounds from previous visit.    3. Moderate major depression (H)  Feeling better with citalopram 20 mg daily.  He is now seeing a psychologist and has found this helpful.  He is feeling better from a mental health standpoint.    4. Essential hypertension  Now on lisinopril/HCTZ.  Blood pressure is better now in the high normal range.  Hopefully with continued weight loss and efforts at regular exercise, blood pressure will continue to improve.  Will reassess in 6 months and will increase dose to 20/25 mg if blood pressure remains over 130/80  - Comprehensive Metabolic Panel  - Urinalysis    5. Suspected sleep apnea  He is reluctant to pursue sleep apnea evaluation as he does not see himself wearing the CPAP mask faithfully.  For now, encouraging efforts at weight loss and reassess in 6 months.  We have  discussed the risks of untreated sleep apnea.    6. Hyperlipidemia, unspecified hyperlipidemia type  Recheck lipid profile.  10-year estimated risk for cardiovascular diseases 14.1%.  He would benefit from a statin if not at goal.  - Lipid Cascade    7. Chronic low back pain without sciatica, unspecified back pain laterality  Stable    8. Abnormal LFTs (liver function tests)  Recheck LFTs    9. Vitamin D deficiency  He is on vitamin D 50,000 units weekly  - Vitamin D, Total (25-Hydroxy)    10. Nephrolithiasis  No recurrence.  Maintaining good hydration    11. Encounter for prostate cancer screening    - PSA (Prostatic-Specific Antigen), Annual Screen    12. Screening for HIV (human immunodeficiency virus)    - HIV Antigen/Antibody Screening Cascade    13. History of colon polyps  5 polyps removed November 2018, repeat in 3 years        Next follow up:  Return in 6 months (on 2/5/2020) for Recheck.    Immunization Review  Adult Imm Review: Discussed Shingrix and annual flu shot        I discussed the following with the patient:   Adult Healthy Living: Importance of regular exercise  Healthy nutrition     The patient was counseled and encouraged to consider modifying their diet and eating habits. He was provided with information on recommended healthy diet options.        Subjective:   Chief Complaint: Nick Coley is an 59 y.o. male here for a preventative health visit.     HPI: Feeling better since restarting citalopram and now seeing psychologist.  Trying to make changes in diet and exercise and is motivated to continue in these efforts.  No change in chronic low back pain.    Healthy Habits  Are you taking a daily aspirin? Yes  Do you typically exercising at least 40 min, 3-4 times per week?  NO  Do you usually eat at least 4 servings of fruit and vegetables a day, include whole grains and fiber and avoid regularly eating high fat foods? NO  Have you had an eye exam in the past two years? Yes  Do you see a  "dentist twice per year? Yes  Do you have any concerns regarding sleep? No    Safety Screen  If you own firearms, are they secured in a locked gun cabinet or with trigger locks? The patient does not own any firearms  Do you feel you are safe where you are living?: Yes (8/5/2019 10:41 AM)  Do you feel you are safe in your relationship(s)?: Yes (8/5/2019 10:41 AM)      Review of Systems:  Please see above.  The rest of the review of systems are negative for all systems.     Cancer Screening       Status Date      COLONOSCOPY Next Due 11/21/2028      Done 11/21/2018               History     Reviewed By Date/Time Sections Reviewed    Long Weston MD 8/5/2019 11:22 AM Tobacco    Long Weston MD 8/5/2019 10:58 AM Medical, Surgical, Tobacco, Alcohol, Drug Use, Sexual Activity, Family, Social Documentation    Vero Gale CMA 8/5/2019 10:41 AM Tobacco, Alcohol, Drug Use, Sexual Activity            Objective:   Vital Signs:   Visit Vitals  /88   Pulse 83   Ht 5' 7.5\" (1.715 m)   Wt (!) 351 lb (159.2 kg)   SpO2 97%   BMI 54.16 kg/m           PHYSICAL EXAM  EYES: Eyelids, conjunctiva, and sclera were normal. Pupils were normal. Cornea, iris, and lens were normal bilaterally.  HEAD, EARS, NOSE, MOUTH, AND THROAT: Head and face were normal. Nose appearance was normal and there was no discharge. Oropharynx was normal.  NECK: Neck appearance was normal. There were no neck masses and the thyroid was not enlarged and no nodules are felt.  No lymphadenopathy.  RESPIRATORY: Breathing pattern was normal and the chest moved symmetrically.  Percussion/auscultatory percussion was normal.  Lung sounds were normal and there were no rales or wheezes.  CARDIOVASCULAR: Heart rate and rhythm were normal.  S1 and S2 were normal and there were no extra sounds or murmurs. Peripheral pulses in arms and legs were normal.  Jugular venous pressure was normal.  There was no peripheral edema.  No carotid " bruits.  GASTROINTESTINAL:   Bowel sounds were present.   Palpation detected no tenderness, mass, or enlarged organs.   RECTAL/PROSTATE: No external lesions.  Sphincter tone normal.  No palpable rectal lesions.  Prostate normal size, smooth, nontender without nodules  MUSCULOSKELETAL: Skeletal configuration was normal and muscle mass was normal for age. Joint appearance was overall normal.  LYMPHATIC: There were no enlarged nodes.  SKIN/HAIR/NAILS: Skin color was normal.  Hair and nails were normal.There were no skin lesions.  NEUROLOGIC: The patient was alert and oriented to person, place, time, and circumstance. Speech was normal. Cranial nerves were normal. Motor strength was normal for age. The patient was normally coordinated.  Sensation intact.  PSYCHIATRIC:  Mood and affect were normal and the patient had normal recent and remote memory. The patient's judgment and insight were normal.    The 10-year ASCVD risk score (Montereyclarence RODRIGUEZ Jr., et al., 2013) is: 14.1%    Values used to calculate the score:      Age: 59 years      Sex: Male      Is Non- : No      Diabetic: No      Tobacco smoker: No      Systolic Blood Pressure: 138 mmHg      Is BP treated: Yes      HDL Cholesterol: 38 mg/dL      Total Cholesterol: 255 mg/dL         Medication List           Accurate as of 8/5/19 11:36 AM. If you have any questions, ask your nurse or doctor.               CONTINUE taking these medications    aspirin 325 MG EC tablet  INSTRUCTIONS:  TAKE ONE TABLET BY MOUTH DAILY        citalopram 20 MG tablet  Also known as:  celeXA  INSTRUCTIONS:  Take 1 tablet (20 mg total) by mouth daily.        ergocalciferol 50,000 unit capsule  Also known as:  VITAMIN D2  INSTRUCTIONS:  Take 1 capsule (50,000 Units total) by mouth once a week.        lisinopril-hydrochlorothiazide 10-12.5 mg per tablet  Also known as:  PRINZIDE,ZESTORETIC  INSTRUCTIONS:  Take 1 tablet by mouth daily.               Additional Screenings Completed  Today:

## 2021-06-29 NOTE — PROGRESS NOTES
Progress Notes by Long Weston MD at 11/5/2020  3:00 PM     Author: Long Weston MD Service: -- Author Type: Physician    Filed: 11/5/2020  3:59 PM Encounter Date: 11/5/2020 Status: Signed    : Long Weston MD (Physician)       MALE PREVENTATIVE EXAM    Assessment and Plan:       1. Routine general medical examination at a health care facility  Immunizations are reviewed and providing flu shot.  Recommending Shingrix.  Living will has been discussed.  Non-smoker.  Uses alcohol in moderation.  Regular exercise discussed.  Up to date with colonoscopies and this should be repeated in 2021.  Prostate exam is normal and I will check a PSA for prostate cancer screening.    Recommending that he sees his ophthalmologist every year. Skin exam performed and recommending regular use of sunblock.  Hepatitis C antibody for screening was normal.  Screened for diabetes with fasting glucose.      2. Morbid obesity (H)  Evaluated at bariatric clinic.  Has modified his diet and has lost 10 pounds.  Discussing bariatric surgery but needs sleep apnea evaluation completed.  Congratulated him on the positive changes that he has already made.    3. Moderate major depression (H)  Mood is stable taking citalopram.  PHQ 9 score is 3    4. Essential hypertension  Blood pressure well controlled with lisinopril/HCTZ    5. Hyperlipidemia, unspecified hyperlipidemia type  Recheck lipid profile.  10-year calculated risk for ASCVD is over 14% and rosuvastatin was recommended last year.  He has yet to start but will encourage him to do so and send a new prescription if lipids are still not at goal.  - Lipid Cascade    6. Suspected sleep apnea  He needs to get sleep apnea work-up completed and is in the process of doing this    7. Vitamin D deficiency  Taking 5000 units of vitamin D daily and recent level looks good    8. Abnormal LFTs (liver function tests)  Minor elevation in LFTs attributed to fatty liver    9.  History of colon polyps  Multiple polyps removed 2018.  Colonoscopy should be repeated next year    10. Need for immunization against influenza    - Influenza, Recombinant, Inj, Quadrivalent, PF, 18+YRS    11. Encounter for prostate cancer screening    - PSA (Prostatic-Specific Antigen), Annual Screen        Next follow up:  Return in 1 year (on 11/5/2021) for Annual physical.        I discussed the following with the patient:   Adult Healthy Living: Importance of regular exercise  Healthy nutrition        Subjective:   Chief Complaint: Nick Coley is an 60 y.o. male here for a preventative health visit.    HPI: Evaluated at bariatric clinic.  Modifying diet and discussing bariatric surgery.  Needs sleep apnea evaluation completed.  No other new specific concerns    Healthy Habits  Are you taking a daily aspirin? Yes  Do you typically exercising at least 40 min, 3-4 times per week?  NO  Do you usually eat at least 4 servings of fruit and vegetables a day, include whole grains and fiber and avoid regularly eating high fat foods? NO  Have you had an eye exam in the past two years? NO  Do you see a dentist twice per year? NO  Do you have any concerns regarding sleep? No    Safety Screen    Do you feel you are safe where you are living?: Yes (11/5/2020  2:51 PM)  Do you feel you are safe in your relationship(s)?: Yes (11/5/2020  2:51 PM)      Review of Systems:  Please see above.  The rest of the review of systems are negative for all systems.     Cancer Screening     Patient has no health maintenance due at this time              History     Reviewed By Date/Time Sections Reviewed    Long Weston MD 11/5/2020  3:18 PM Medical, Surgical, Tobacco, Alcohol, Drug Use, Sexual Activity, Family, Social Documentation    Vero Gale CMA 11/5/2020  2:51 PM Tobacco, Alcohol, Drug Use            Objective:   Vital Signs:   Visit Vitals  /64 (Patient Site: Left Arm, Patient Position: Sitting, Cuff Size:  "Adult Large)   Pulse 77   Ht 5' 7.5\" (1.715 m)   Wt (!) 342 lb (155.1 kg)   SpO2 94%   BMI 52.77 kg/m           PHYSICAL EXAM  EYES: Eyelids, conjunctiva, and sclera were normal. Pupils were normal. Cornea, iris, and lens were normal bilaterally.  HEAD, EARS, NOSE, MOUTH, AND THROAT: Head and face were normal.  TMs normal  NECK: Neck appearance was normal. There were no neck masses and the thyroid was not enlarged and no nodules are felt.  No lymphadenopathy.  RESPIRATORY: Breathing pattern was normal and the chest moved symmetrically.  Percussion/auscultatory percussion was normal.  Lung sounds were normal and there were no rales or wheezes.  CARDIOVASCULAR: Heart rate and rhythm were normal.  S1 and S2 were normal and there were no extra sounds or murmurs. Peripheral pulses in arms and legs were normal.  Jugular venous pressure was normal.  There was no peripheral edema.  No carotid bruits.  GASTROINTESTINAL:   Bowel sounds were present.   Palpation detected no tenderness, mass, or enlarged organs.   RECTAL/PROSTATE: No external lesions.  Sphincter tone normal.  No palpable rectal lesions.  Prostate normal size, smooth, nontender without nodules  MUSCULOSKELETAL: Skeletal configuration was normal and muscle mass was normal for age. Joint appearance was overall normal.  LYMPHATIC: There were no enlarged nodes.  SKIN/HAIR/NAILS: Skin color was normal.  Hair and nails were normal.There were no skin lesions.  NEUROLOGIC: The patient was alert and oriented to person, place, time, and circumstance. Speech was normal. Cranial nerves were normal. Motor strength was normal for age. The patient was normally coordinated.  Sensation intact.  PSYCHIATRIC:  Mood and affect were normal and the patient had normal recent and remote memory. The patient's judgment and insight were normal.  PHQ 9 score is 3    The 10-year ASCVD risk score (Marcelo MICHAEL Jr., et al., 2013) is: 14.7%    Values used to calculate the score:      Age: 60 " years      Sex: Male      Is Non- : No      Diabetic: No      Tobacco smoker: No      Systolic Blood Pressure: 128 mmHg      Is BP treated: Yes      HDL Cholesterol: 39 mg/dL      Total Cholesterol: 288 mg/dL         Medication List          Accurate as of November 5, 2020  3:58 PM. If you have any questions, ask your nurse or doctor.            CONTINUE taking these medications    aspirin 81 MG EC tablet  INSTRUCTIONS: Take 1 tablet (81 mg total) by mouth daily.        cholecalciferol (vitamin D3) 5,000 unit Tab  INSTRUCTIONS: Take by mouth daily.        citalopram 20 MG tablet  Also known as: celeXA  INSTRUCTIONS: Take 1 tablet (20 mg total) by mouth daily.        lisinopriL-hydrochlorothiazide 20-25 mg per tablet  Also known as: Zestoretic  INSTRUCTIONS: Take 1 tablet by mouth daily.  Doctor's comments: Replaces 10/12.5mg dose        rosuvastatin 10 MG tablet  Also known as: Crestor  INSTRUCTIONS: Take 1 tablet (10 mg total) by mouth daily.               Additional Screenings Completed Today:

## 2021-07-06 ENCOUNTER — COMMUNICATION - HEALTHEAST (OUTPATIENT)
Dept: SURGERY | Facility: CLINIC | Age: 61
End: 2021-07-06

## 2021-07-06 VITALS
WEIGHT: 315 LBS | HEIGHT: 67 IN | BODY MASS INDEX: 50.32 KG/M2 | BODY MASS INDEX: 52.94 KG/M2 | HEIGHT: 67 IN | WEIGHT: 315 LBS | WEIGHT: 315 LBS | BODY MASS INDEX: 51.64 KG/M2 | BODY MASS INDEX: 52.16 KG/M2 | BODY MASS INDEX: 52.94 KG/M2

## 2021-07-06 NOTE — TELEPHONE ENCOUNTER
Telephone Encounter by Shirlene Vasquez RN at 7/6/2021 12:36 PM     Author: Shirlene Vasquez RN Service: -- Author Type: Registered Nurse    Filed: 7/6/2021 12:45 PM Encounter Date: 7/6/2021 Status: Signed    : Shirlene Vasquez RN (Registered Nurse)       Patient called Teresa and told her that she has been struggling with reflux above and beyond taking the omeprazole faithfully.  We discussed increasing his Omeprazole to twice daily and avoiding food and drink close to the bedtime hours because that is when it seems to be a problem, evening and sometimes at night.    He also noted that he is having some constipation off and on to which we discussed ways to help with that on a daily basis including increasing his fluid intake and activity along with adding in a stool softener or miralax for the time until more fiber is added back into his diet.      Patient verbalized understanding of the above.    Shirlene Vasquez RN

## 2021-07-12 ENCOUNTER — VIRTUAL VISIT (OUTPATIENT)
Dept: SURGERY | Facility: CLINIC | Age: 61
End: 2021-07-12
Payer: COMMERCIAL

## 2021-07-12 DIAGNOSIS — Z98.84 BARIATRIC SURGERY STATUS: Primary | ICD-10-CM

## 2021-07-12 DIAGNOSIS — Z71.89 ENCOUNTER FOR PRE-BARIATRIC SURGERY COUNSELING AND EDUCATION: ICD-10-CM

## 2021-07-12 DIAGNOSIS — Z71.3 NUTRITIONAL COUNSELING: ICD-10-CM

## 2021-07-12 PROCEDURE — 99024 POSTOP FOLLOW-UP VISIT: CPT | Performed by: DIETITIAN, REGISTERED

## 2021-07-12 NOTE — LETTER
7/12/2021         RE: Nick Coley  [address] [place] [address]        Dear Colleague,    Thank you for referring your patient, Nick Coley, to the [facility] [place]. Please see a copy of my visit note below.    Pt presents for 1 month post op dietitian follow up visit. Reports tolerating soft food diet well. Denies n/v, constipation/diarrhea, or significant pain. Taking MVI and Vitamin B12 appropriately.Instructed pt to begin taking 500 mg calcium citrate BID and 5000 IU Vitamin D3. Educated pt on soft to bariatric regular diets, medications, developing an exercise regimen, and other dietary points of care. Provided  Education handout on items discussed. Pt to follow up with RD at 3 months post op.     Have you been to the hospital or seen by a doctor for any reason since your surgery? No     If yes:  Have you been seen in an outpatient clinic or doctors office after your surgery? Yes  If yes, was this a routine follow-up? Yes  Date of visit: 6/25/2021  Have you experienced any health problems since your surgery? No     Did you go to an Emergency Department or hospital after your surgery? No     Did you have additional surgery(ies) during this hospitalization? No     Date of surgery: 6/15/2021        Again, thank you for allowing me to participate in the care of your patient.        Sincerely,        Zaina Cerda RD

## 2021-07-12 NOTE — PROGRESS NOTES
Pt presents for 1 month post op dietitian follow up visit. Reports tolerating soft food diet well. Denies n/v, constipation/diarrhea, or significant pain. Taking MVI and Vitamin B12 appropriately.Instructed pt to begin taking 500 mg calcium citrate BID and 5000 IU Vitamin D3. Educated pt on soft to bariatric regular diets, medications, developing an exercise regimen, and other dietary points of care. Provided  Education handout on items discussed. Pt to follow up with RD at 3 months post op.     Have you been to the hospital or seen by a doctor for any reason since your surgery? No     If yes:  Have you been seen in an outpatient clinic or doctors office after your surgery? Yes  If yes, was this a routine follow-up? Yes  Date of visit: 6/25/2021  Have you experienced any health problems since your surgery? No     Did you go to an Emergency Department or hospital after your surgery? No     Did you have additional surgery(ies) during this hospitalization? No     Date of surgery: 6/15/2021

## 2021-07-12 NOTE — NURSING NOTE
Pt presents for 1 month post op dietitian follow up visit. Reports tolerating soft food diet well. Denies n/v, constipation/diarrhea, or significant pain. Taking MVI and Vitamin B12 appropriately.Instructed pt to begin taking 500 mg calcium citrate BID and 5000 IU Vitamin D3. Educated pt on soft to bariatric regular diets, medications, developing an exercise regimen, and other dietary points of care. Provided  Education handout on items discussed. Pt to follow up with RD at 3 months post op.     Have you been to the hospital or seen by a doctor for any reason since your surgery? No     If yes:  Have you been seen in an outpatient clinic or doctors office after your surgery?   If yes, was this a routine follow-up? Yes   Date of visit: 6/25/2021    Have you experienced any health problems since your surgery?  No     Did you go to an Emergency Department or hospital after your surgery? No     Did you have additional surgery(ies) during this hospitalization? No     Date of surgery: 6/15/2021

## 2021-07-21 ENCOUNTER — DOCUMENTATION ONLY (OUTPATIENT)
Dept: SLEEP MEDICINE | Facility: CLINIC | Age: 61
End: 2021-07-21
Payer: COMMERCIAL

## 2021-07-21 NOTE — PROGRESS NOTES
6 month [facility] Recheck Visit     Diagnostic AHI:   40.7 events per hourwatch PAT    Message left for patient to return call.     Assessment: Pt not meeting objective benchmarks for compliance     Action plan: waiting for patient to return call.   pt to follow up per provider request       Device type: Auto-CPAP  PAP settings: CPAP min 5.0 cm  H20     CPAP max 15.0 cm  H20  Objective measures: 14 day rolling measures     Objective measure goal  Compliance   Goal >70%  Leak   Goal < 24 lpm  AHI  Goal < 5  Usage  Goal >240    Total time spent on accessing, reviewing and interpreting remote patient PAP therapy data:   10 minutes      Total time spent with direct patient communication :   1 minutes

## 2021-07-29 ENCOUNTER — MYC MEDICAL ADVICE (OUTPATIENT)
Dept: SURGERY | Facility: CLINIC | Age: 61
End: 2021-07-29

## 2021-07-29 DIAGNOSIS — Z90.3 HISTORY OF SLEEVE GASTRECTOMY: Primary | ICD-10-CM

## 2021-07-29 DIAGNOSIS — E78.2 MIXED HYPERLIPIDEMIA: Primary | ICD-10-CM

## 2021-07-29 DIAGNOSIS — R10.13 ABDOMINAL PAIN, EPIGASTRIC: ICD-10-CM

## 2021-07-29 NOTE — TELEPHONE ENCOUNTER
"Called pt to discuss his symptoms and left him a message asking him to call back.    Hannah Davis RN, CBN  [facility]  P [phone]  F [phone]    2:57 PM  Has had some discomfort thurs/fri/sat last week working outside in the heat, was drinking well, didn't eat very well despite bringing meals.      Feels mid abdominal pain since Monday. Above belly button will have intermittent cramping sensation/\"heart burn feeling\". Eating can worsen it mildly but sometimes feels like \"hunger pains\".   Baby asa has not restarted.  No alcohol.  No nicotine.  No vomiting/diarrhea.   Had some early constipation resolved with meds. BMs are now once daily, but no blood/melena.   Sunday bought a sugar free ice coffee.a  Under 300 lbs now.     Overall benign story at this point, no infectious/obstruction or leak sx. Will Rx week of carafate, continue BID omeprazole and expectant mangement. If still ill feeling may warrant lab check for any biliary disease flare up given mid abdominal sx. Reviewed signs/sx to seek ER evaluation for over the weekend but he describes sx currently as \"just nagging a bit\" and intermittent.    Kind Regards,  Irvin Branch MD  [facility]        "

## 2021-07-30 RX ORDER — SUCRALFATE 1 G/1
TABLET ORAL
Qty: 28 TABLET | Refills: 0 | Status: SHIPPED | OUTPATIENT
Start: 2021-07-30 | End: 2021-10-04

## 2021-07-30 RX ORDER — ROSUVASTATIN CALCIUM 10 MG/1
10 TABLET, COATED ORAL DAILY
Qty: 90 TABLET | Refills: 3 | Status: SHIPPED | OUTPATIENT
Start: 2021-07-30 | End: 2021-11-09

## 2021-08-24 DIAGNOSIS — R12 HEARTBURN: ICD-10-CM

## 2021-08-24 DIAGNOSIS — R10.13 ABDOMINAL PAIN, EPIGASTRIC: ICD-10-CM

## 2021-08-24 DIAGNOSIS — Z90.3 HISTORY OF SLEEVE GASTRECTOMY: Primary | ICD-10-CM

## 2021-08-30 DIAGNOSIS — F32.1 MODERATE MAJOR DEPRESSION (H): Primary | ICD-10-CM

## 2021-08-30 DIAGNOSIS — I10 ESSENTIAL HYPERTENSION: ICD-10-CM

## 2021-08-30 RX ORDER — LISINOPRIL AND HYDROCHLOROTHIAZIDE 20; 25 MG/1; MG/1
1 TABLET ORAL DAILY
Qty: 90 TABLET | Refills: 3 | Status: SHIPPED | OUTPATIENT
Start: 2021-08-30 | End: 2021-10-04

## 2021-08-30 RX ORDER — CITALOPRAM HYDROBROMIDE 20 MG/1
20 TABLET ORAL DAILY
Qty: 90 TABLET | Refills: 3 | Status: SHIPPED | OUTPATIENT
Start: 2021-08-30 | End: 2021-11-09

## 2021-09-13 ENCOUNTER — TELEPHONE (OUTPATIENT)
Dept: SURGERY | Facility: CLINIC | Age: 61
End: 2021-09-13

## 2021-09-13 NOTE — TELEPHONE ENCOUNTER
Called pt to discuss and he says it's been happening for over a month. 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 Was checked out by the EMTs there and his BP was normal as well as his blood sugar. He says this has been happening when he changes positions for the pst month or so but Friday was the worst.  Pt says he is drinking less fluids than he should be because he feels full when he drinks. He says he isn't drinking small sips throughout the day like he was at the beginning. He is still taking his Lisinopril-hydrochlorothiazide at his pre-surgery dosage and is drinking about 40-50 oz of water per day in addition to 2 protein shakes. He says he feels full and can't drink as much as he should. He says his most recent weight is 285 lb (initial weight was 351).  I let him know that it sounds like he needs to see his PCP to evaluate his BP and need for the med that he is on. He agrees and said that he had an appt scheduled for today but it was cancelled when he said he is s/p bariatric surgery. We discussed this and he will call them again to attempt to get an appt with his PCP. I let him know that I will also route to  for his recommendations. Pt verbalized understanding.    Hannah Davis RN, CBN  [facility]  P [phone]  F [phone]

## 2021-09-13 NOTE — TELEPHONE ENCOUNTER
Pt is feeling dizzy whenever he bends over and stands back up. Would like a call back to discuss    [phone]

## 2021-09-14 ENCOUNTER — OFFICE VISIT (OUTPATIENT)
Dept: FAMILY MEDICINE | Facility: CLINIC | Age: 61
End: 2021-09-14
Payer: COMMERCIAL

## 2021-09-14 VITALS
WEIGHT: 285 LBS | DIASTOLIC BLOOD PRESSURE: 65 MMHG | SYSTOLIC BLOOD PRESSURE: 110 MMHG | OXYGEN SATURATION: 98 % | BODY MASS INDEX: 44.64 KG/M2 | TEMPERATURE: 98.9 F | HEART RATE: 60 BPM | RESPIRATION RATE: 16 BRPM

## 2021-09-14 DIAGNOSIS — R42 LIGHTHEADEDNESS: Primary | ICD-10-CM

## 2021-09-14 DIAGNOSIS — Z98.84 HISTORY OF GASTRIC BYPASS: ICD-10-CM

## 2021-09-14 LAB
ALBUMIN UR-MCNC: NEGATIVE MG/DL
ANION GAP SERPL CALCULATED.3IONS-SCNC: 10 MMOL/L (ref 5–18)
APPEARANCE UR: CLEAR
BASOPHILS # BLD AUTO: 0 10E3/UL (ref 0–0.2)
BASOPHILS NFR BLD AUTO: 1 %
BILIRUB UR QL STRIP: NEGATIVE
BUN SERPL-MCNC: 22 MG/DL (ref 8–22)
CALCIUM SERPL-MCNC: 10.3 MG/DL (ref 8.5–10.5)
CHLORIDE BLD-SCNC: 102 MMOL/L (ref 98–107)
CO2 SERPL-SCNC: 28 MMOL/L (ref 22–31)
COLOR UR AUTO: YELLOW
CREAT SERPL-MCNC: 0.97 MG/DL (ref 0.7–1.3)
EOSINOPHIL # BLD AUTO: 0.1 10E3/UL (ref 0–0.7)
EOSINOPHIL NFR BLD AUTO: 2 %
ERYTHROCYTE [DISTWIDTH] IN BLOOD BY AUTOMATED COUNT: 15.7 % (ref 10–15)
GFR SERPL CREATININE-BSD FRML MDRD: 85 ML/MIN/1.73M2
GLUCOSE BLD-MCNC: 91 MG/DL (ref 70–125)
GLUCOSE UR STRIP-MCNC: NEGATIVE MG/DL
HCT VFR BLD AUTO: 44.3 % (ref 40–53)
HGB BLD-MCNC: 14.4 G/DL (ref 13.3–17.7)
HGB UR QL STRIP: NEGATIVE
IMM GRANULOCYTES # BLD: 0 10E3/UL
IMM GRANULOCYTES NFR BLD: 0 %
KETONES UR STRIP-MCNC: NEGATIVE MG/DL
LEUKOCYTE ESTERASE UR QL STRIP: NEGATIVE
LYMPHOCYTES # BLD AUTO: 2 10E3/UL (ref 0.8–5.3)
LYMPHOCYTES NFR BLD AUTO: 34 %
MCH RBC QN AUTO: 28 PG (ref 26.5–33)
MCHC RBC AUTO-ENTMCNC: 32.5 G/DL (ref 31.5–36.5)
MCV RBC AUTO: 86 FL (ref 78–100)
MONOCYTES # BLD AUTO: 0.4 10E3/UL (ref 0–1.3)
MONOCYTES NFR BLD AUTO: 7 %
NEUTROPHILS # BLD AUTO: 3.3 10E3/UL (ref 1.6–8.3)
NEUTROPHILS NFR BLD AUTO: 56 %
NITRATE UR QL: NEGATIVE
PH UR STRIP: 5.5 [PH] (ref 5–8)
PLATELET # BLD AUTO: 217 10E3/UL (ref 150–450)
POTASSIUM BLD-SCNC: 4.2 MMOL/L (ref 3.5–5)
RBC # BLD AUTO: 5.14 10E6/UL (ref 4.4–5.9)
SODIUM SERPL-SCNC: 140 MMOL/L (ref 136–145)
SP GR UR STRIP: 1.02 (ref 1–1.03)
UROBILINOGEN UR STRIP-ACNC: 0.2 E.U./DL
WBC # BLD AUTO: 5.8 10E3/UL (ref 4–11)

## 2021-09-14 PROCEDURE — 36415 COLL VENOUS BLD VENIPUNCTURE: CPT | Performed by: PHYSICIAN ASSISTANT

## 2021-09-14 PROCEDURE — 81003 URINALYSIS AUTO W/O SCOPE: CPT | Performed by: PHYSICIAN ASSISTANT

## 2021-09-14 PROCEDURE — 80048 BASIC METABOLIC PNL TOTAL CA: CPT | Performed by: PHYSICIAN ASSISTANT

## 2021-09-14 PROCEDURE — 85025 COMPLETE CBC W/AUTO DIFF WBC: CPT | Performed by: PHYSICIAN ASSISTANT

## 2021-09-14 PROCEDURE — 99214 OFFICE O/P EST MOD 30 MIN: CPT | Performed by: PHYSICIAN ASSISTANT

## 2021-09-14 NOTE — PATIENT INSTRUCTIONS
Follow-up with your primary care provider to review your blood pressure and hypertension management with medication.      Patient Education     Dizziness (Uncertain Cause)  Dizziness is a common symptom. It may be described as lightheadedness, spinning, or feeling like you are going to faint. Dizziness can have many causes.   Tell the healthcare provider about:     All medicines you take, including prescription, over-the-counter, herbs, and supplements    Any other symptoms you have    Any health problems you are being treated for    Any past major health problems you've had, such as a heart attack, balance issues, hearing problems, or blood pressure problems    Anything that causes the dizziness to get worse or better  Today's exam did not show an exact cause for your dizziness . Other tests may be needed. Follow up with your healthcare provider.   Home care    Dizziness that occurs with sudden standing may be a sign of mild dehydration. Drink extra fluids for the next few days.    If you recently started a new medicine, stopped a medicine, or had the dose of a current medicine changed, talk with the prescribing healthcare provider. Your medicine plan may need adjustment.    If dizziness lasts more than a few seconds, sit or lie down until it passes. This may help prevent injury in case you pass out. Get up slowly when you feel better.    Don't drive or use power tools or dangerous equipment until you have had no dizziness for at least 48 hours.    Follow-up care  Follow up with your healthcare provider for further evaluation in the next 7 days, or as advised.   When to get medical advice  Call your healthcare provider for any of the following:     Worsening of symptoms or new symptoms    Repeated vomiting    Headache    Vision or hearing changes  Call 911  Call 911, or get medical care right away if any of these occur:     Chest, arm, neck, back, or jaw pain    Weakness of an arm or leg or one side of the  face    Blood in vomit or stool (black or red color)    Shortness of breath    Feeling that your heart is fluttering or beating fast or hard (palpitations)    Passing out or seizure    Trouble walking or speaking  [employer] last reviewed this educational content on 2/1/2020 2000-2021 [employer]. All rights reserved. This information is not intended as a substitute for professional medical care. Always follow your healthcare professional's instructions.

## 2021-09-14 NOTE — PROGRESS NOTES
Patient presents with:  Dizziness: happens when bending or stand up having a hard time drinking at time had gastric sleeve done 3 months ago       Clinical Decision Making:  I had a conversation with the patient stating that we did check his orthostatic vitals and laboratory values.  We did not find problems with anemia or significant change with orthostatics.  Patient will have follow-up in 1 week with his primary care provider where at that time an EKG looking for cardiac concerns, evaluation of his hypertensive medications or other causes for dizziness.  I did suggest that he continue to try to orally hydrate to help prevent being dehydrated since he finds this a challenge with being full when he eats he finds it hard to actually take in fluids during the day.  Expected course of resolution and indication for return was gone over and questions were answered to patient/parent's satisfaction before discharge.        ICD-10-CM    1. Lightheadedness  R42 CBC with platelets and differential     UA macro with reflex to Microscopic and Culture - Clinc Collect     Basic metabolic panel   2. History of gastric bypass  Z98.84 CBC with platelets and differential     UA macro with reflex to Microscopic and Culture - Clinc Collect     Basic metabolic panel       Patient Instructions   Follow-up with your primary care provider to review your blood pressure and hypertension management with medication.      Patient Education     Dizziness (Uncertain Cause)  Dizziness is a common symptom. It may be described as lightheadedness, spinning, or feeling like you are going to faint. Dizziness can have many causes.   Tell the healthcare provider about:     All medicines you take, including prescription, over-the-counter, herbs, and supplements    Any other symptoms you have    Any health problems you are being treated for    Any past major health problems you've had, such as a heart attack, balance issues, hearing problems, or blood  pressure problems    Anything that causes the dizziness to get worse or better  Today's exam did not show an exact cause for your dizziness . Other tests may be needed. Follow up with your healthcare provider.   Home care    Dizziness that occurs with sudden standing may be a sign of mild dehydration. Drink extra fluids for the next few days.    If you recently started a new medicine, stopped a medicine, or had the dose of a current medicine changed, talk with the prescribing healthcare provider. Your medicine plan may need adjustment.    If dizziness lasts more than a few seconds, sit or lie down until it passes. This may help prevent injury in case you pass out. Get up slowly when you feel better.    Don't drive or use power tools or dangerous equipment until you have had no dizziness for at least 48 hours.    Follow-up care  Follow up with your healthcare provider for further evaluation in the next 7 days, or as advised.   When to get medical advice  Call your healthcare provider for any of the following:     Worsening of symptoms or new symptoms    Repeated vomiting    Headache    Vision or hearing changes  Call 911  Call 911, or get medical care right away if any of these occur:     Chest, arm, neck, back, or jaw pain    Weakness of an arm or leg or one side of the face    Blood in vomit or stool (black or red color)    Shortness of breath    Feeling that your heart is fluttering or beating fast or hard (palpitations)    Passing out or seizure    Trouble walking or speaking  [employer] last reviewed this educational content on 2/1/2020 2000-2021 [employer]. All rights reserved. This information is not intended as a substitute for professional medical care. Always follow your healthcare professional's instructions.               HPI:  Nick CASTILLO Ziggygeorge is a 61 year old male who presents today for lightheadedness or possibly dizziness.  Patient states that he notices that he becomes more lightheaded  with change of position.  From lying to sitting and sitting to standing.  He has not had overt syncope presyncope nausea vomiting diarrhea heart palpitations or melanotic stools.  He does not have any epigastric or abdominal pain to report.  Patient is status post a gastric sleeve bypass 3 months ago.     Patient denies use of alcohol or nicotine but he does have 1-2 caffeinated beverages per day.    History obtained from chart review and the patient.    Problem List:  2021-01: ANAM (obstructive sleep apnea)  2020-10: Morbid obesity (H)  2020-10: Snoring  2020-10: Shifting sleep-work schedule  Depression with anxiety      Past Medical History:   Diagnosis Date     Abnormal LFTs (liver function tests) 12/18/2015    Negative hepatitis C antibody and ferritin of 330 6 December 2015, steatohepatitis suspected     Benign essential hypertension      Cellulitis of left lower extremity 05/29/2020     Cellulitis, face 2014     Chronic kidney disease      Chronic tension-type headache, not intractable 06/10/2019     Depression with anxiety      Essential hypertension 12/18/2015     Fatty liver     ultrasound 2001 demonstrated.     Hematoma of leg, left, subsequent encounter 07/23/2018     History of colon polyps     Colonoscopy November 2018 with multiple polyps removed, repeat in 3 years     Hyperlipidemia      Irritable bowel syndrome      Kidney stones     x 4     LBP (low back pain)      Microscopic hematuria 07/23/2018     Moderate major depression (H) 06/10/2019     Morbid obesity (H) 12/18/2015     Nephrolithiasis 12/2020    Right     Nephrolithiasis     2013, 2009, 2001.  Right percutaneous nephrolithotomy March 2021.  Stone analysis 90% uric acid and 10% calcium oxalate     ANAM (obstructive sleep apnea) 01/2021    Severe     Sleep apnea     uses cpap     Suspected sleep apnea 11/16/2018     Tear of meniscus of right knee 12/18/2015    Associated with right tibial plateau fracture 2013, arthroscopic knee surgery  December 2015     Vitamin D deficiency     Vitamin D 10.0 November 2018     Wrist fracture        Social History     Tobacco Use     Smoking status: Never Smoker     Smokeless tobacco: Never Used     Tobacco comment: cigar once or twice a year   Substance Use Topics     Alcohol use: Yes     Comment: Alcoholic Drinks/day: just during softball season or vacation-0-2       Review of Systems  As above in HPI otherwise negative.    Vitals:    09/14/21 1217 09/14/21 1241 09/14/21 1243 09/14/21 1245   BP: 117/84 101/61 104/62 110/65   Patient Position:  Sitting     Pulse: 60      Resp: 16      Temp: 98.9  F (37.2  C)      TempSrc: Oral      SpO2: 98%      Weight: 129.3 kg (285 lb)        General: Patient is resting comfortably no acute distress is afebrile  HEENT: Head is normocephalic atraumatic   eyes are PERRL EOMI sclera anicteric   Throat is clear  No cervical lymphadenopathy present  LUNGS: Clear to auscultation bilaterally  HEART: Regular rate and rhythm  Abdomen: soft non tender  Skin: Without rash non-diaphoretic      Physical Exam    Labs:  Results for orders placed or performed in visit on 09/14/21   CBC with platelets and differential     Status: Abnormal    Narrative    The following orders were created for panel order CBC with platelets and differential.  Procedure                               Abnormality         Status                     ---------                               -----------         ------                     CBC with platelets and d...[[id number]]  Abnormal            Final result                 Please view results for these tests on the individual orders.   UA macro with reflex to Microscopic and Culture - Clinc Collect     Status: Normal    Specimen: Urine, Clean Catch   Result Value Ref Range    Color Urine Yellow Colorless, Straw, Light Yellow, Yellow    Appearance Urine Clear Clear    Glucose Urine Negative Negative mg/dL    Bilirubin Urine Negative Negative    Ketones Urine Negative  Negative mg/dL    Specific Gravity Urine 1.025 1.005 - 1.030    Blood Urine Negative Negative    pH Urine 5.5 5.0 - 8.0    Protein Albumin Urine Negative Negative mg/dL    Urobilinogen Urine 0.2 0.2, 1.0 E.U./dL    Nitrite Urine Negative Negative    Leukocyte Esterase Urine Negative Negative    Narrative    Microscopic not indicated   CBC with platelets and differential     Status: Abnormal   Result Value Ref Range    WBC Count 5.8 4.0 - 11.0 10e3/uL    RBC Count 5.14 4.40 - 5.90 10e6/uL    Hemoglobin 14.4 13.3 - 17.7 g/dL    Hematocrit 44.3 40.0 - 53.0 %    MCV 86 78 - 100 fL    MCH 28.0 26.5 - 33.0 pg    MCHC 32.5 31.5 - 36.5 g/dL    RDW 15.7 (H) 10.0 - 15.0 %    Platelet Count 217 150 - 450 10e3/uL    % Neutrophils 56 %    % Lymphocytes 34 %    % Monocytes 7 %    % Eosinophils 2 %    % Basophils 1 %    % Immature Granulocytes 0 %    Absolute Neutrophils 3.3 1.6 - 8.3 10e3/uL    Absolute Lymphocytes 2.0 0.8 - 5.3 10e3/uL    Absolute Monocytes 0.4 0.0 - 1.3 10e3/uL    Absolute Eosinophils 0.1 0.0 - 0.7 10e3/uL    Absolute Basophils 0.0 0.0 - 0.2 10e3/uL    Absolute Immature Granulocytes 0.0 <=0.0 10e3/uL     Basic metabolic panel is pending at time of documentation.    At the end of the encounter, I discussed results, diagnosis, medications. Discussed red flags for immediate return to clinic/ER, as well as indications for follow up if no improvement. Patient understood and agreed to plan. Patient was stable for discharge.

## 2021-09-17 ENCOUNTER — VIRTUAL VISIT (OUTPATIENT)
Dept: SURGERY | Facility: CLINIC | Age: 61
End: 2021-09-17
Payer: COMMERCIAL

## 2021-09-17 DIAGNOSIS — Z90.3 HISTORY OF SLEEVE GASTRECTOMY: Primary | ICD-10-CM

## 2021-09-17 DIAGNOSIS — Z71.3 NUTRITIONAL COUNSELING: ICD-10-CM

## 2021-09-17 DIAGNOSIS — Z48.89 POSTOPERATIVE VISIT: ICD-10-CM

## 2021-09-17 PROCEDURE — 99207 PR DROP WITH A PROCEDURE: CPT | Mod: 25 | Performed by: DIETITIAN, REGISTERED

## 2021-09-17 PROCEDURE — 97803 MED NUTRITION INDIV SUBSEQ: CPT | Performed by: DIETITIAN, REGISTERED

## 2021-09-17 NOTE — PROGRESS NOTES
Nick Coley is a 61 year old who is being evaluated via a billable video visit.      How would you like to obtain your AVS? MyChart  If the video visit is dropped, the invitation should be resent by: Send to e-mail at: [email or web address]  Will anyone else be joining your video visit? No      Video Start Time: 7:44 AM      Post-op Surgical Weight Loss Diet Evaluation     Assessment:  Pt presents for 3 months post-op RD visit, s/p LSG on 6/15/2021 with Dr. Olmos. Today we reviewed current eating habits and level of physical activity, and instructed on the changes that are required for successful bariatric outcomes.    Patient Progress: having some issues with dizzy spells, hard time with water intake    Initial Weight: 338 lbs   Current Weight: 282 lbs     There is no height or weight on file to calculate BMI.    Patient Active Problem List   Diagnosis     Morbid obesity (H)     Snoring     Shifting sleep-work schedule     ANAM (obstructive sleep apnea)       Diabetes: No     Vitamins   Multi Vit with Iron: yes  Calcium Citrate: yes  B12: yes  D3: yes    Do you experience hunger? Increased more recently    Diet Recall/Time:   Breakfast: eggs (1), turkey sausage, unsweetened applesauce   Lunch: Lean Cuisine or Deli Meat (turkey, ham), whole wheat crackers   Dinner: Lean Cuisine or something prepared by wife (Shredded Chicken Breast) or Protein Shake     Typical Snacks: Protein Shake x 2     Estimated protein intake: 60-80 grams    Estimated portion size per meals:up to 1/2 cup/meal    Meals per week away from home:  3 times since surgery     Meal Duration:20-30 minutes    Fluid-meal separation:  Fluids are  30min before and 30 minutes after meals.    Fluid Intake  Water: 32 oz/day   Other: V8 Juice, Protein Shakes    Exercise: walking/biking at work (5431-9236 steps just at work)       PES statement:      (NC-1.4) Altered GI Function related to Alteration in gastrointestinal tract structure and/or  "function/ Decreased functional length of the GI tract as evidenced by Weight loss of 17% initial body weight; sleeve gastrectomy    Intervention    Discussion  1. Discussed 3 Months Post-Op Nutritional Guidelines for LSG  2. Recommended to consume 15-20gm protein at 3 meals daily, along with protein supplement/\"planned protein containing snack\" of 15-30gm protein, to reach goal of 60-80 gm protein daily.  3. Educated on post-op vitamin regimen: Multi Vit + iron 2x/day, calcium citrate 400-600 mg 2x/day, 6446-0212 mcg of Sublingual B-12 daily, and 5000 IU Vitamin D3 daily (MVI and calcium can be taken at the same time BID)  4. Reviewed lean protein sources  5. Bariatric Plate Method-  including lean/low fat protein at each meal, including a vegetable/fruit, and limiting carbohydrate intake to less than 25% of plate volume.     Instructions  1. Include 15-20gm protein at each meal, along with protein supplement/\"planned protein containing snack\" of 15-30gm protein, to reach goal of 60-80 gm protein daily.  2. Increase fluid intake to 64oz daily: choose plain or calorie/carbonation-free beverages.  3. Incorporate daily structured activity, 30-60 minutes most days of the week  4. Recommended pt to start taking: Multi Vit + iron 2x/day, calcium citrate 400-600 mg 2x/day, 4728-9298 mcg of Sublingual B-12 daily, and 5000 IU Vitamin D3 daily. (MVI and calcium can be taken at the same time)  5. Read food labels more consistently: keeping total fat grams <10, total sugar grams <10, fiber >3gm per serving.  6. Increase vegetable/fruit intake, by having a vegetable or fruit with each meal daily.  7. Practice plate method: 1/2 plate lean/low fat protein source, vegetable/fruit, <25% of plate complex carbohydrates.  8. Separate fluids 30 minutes before/after meal times.  9. Practice eating off of smaller plates/bowls, chewing to applesauce consistency, taking 20-30 minutes to eat in a calm/relaxed environment without distractions " of tv/email/cell phone.    Handouts provided:  3 months Post-Op Nutritional Guidelines for LSG    Assessment/Plan:    Pt to follow up for 6 months post-op visit with bariatrician       Video-Visit Details    Type of service:  Video Visit    Video End Time:8:15 AM    Originating Location (pt. Location): Home    Distant Location (provider location):  [facility] [place]     Platform used for Video Visit: Melodie Cerda, KELLY

## 2021-09-17 NOTE — LETTER
9/17/2021         RE: Nick Coley  [address] [place] [address]        Dear Colleague,    Thank you for referring your patient, Nick Coley, to the [facility] [place]. Please see a copy of my visit note below.    Nick Coley is a 61 year old who is being evaluated via a billable video visit.      How would you like to obtain your AVS? MyChart  If the video visit is dropped, the invitation should be resent by: Send to e-mail at: [email or web address]  Will anyone else be joining your video visit? No      Video Start Time: 7:44 AM      Post-op Surgical Weight Loss Diet Evaluation     Assessment:  Pt presents for 3 months post-op RD visit, s/p LSG on 6/15/2021 with Dr. Olmos. Today we reviewed current eating habits and level of physical activity, and instructed on the changes that are required for successful bariatric outcomes.    Patient Progress: having some issues with dizzy spells, hard time with water intake    Initial Weight: 338 lbs   Current Weight: 282 lbs     There is no height or weight on file to calculate BMI.    Patient Active Problem List   Diagnosis     Morbid obesity (H)     Snoring     Shifting sleep-work schedule     ANAM (obstructive sleep apnea)       Diabetes: No     Vitamins   Multi Vit with Iron: yes  Calcium Citrate: yes  B12: yes  D3: yes    Do you experience hunger? Increased more recently    Diet Recall/Time:   Breakfast: eggs (1), turkey sausage, unsweetened applesauce   Lunch: Lean Cuisine or Deli Meat (turkey, ham), whole wheat crackers   Dinner: Lean Cuisine or something prepared by wife (Shredded Chicken Breast) or Protein Shake     Typical Snacks: Protein Shake x 2     Estimated protein intake: 60-80 grams    Estimated portion size per meals:up to 1/2 cup/meal    Meals per week away from home:  3 times since surgery     Meal Duration:20-30 minutes    Fluid-meal separation:  Fluids are  30min before and 30  "minutes after meals.    Fluid Intake  Water: 32 oz/day   Other: V8 Juice, Protein Shakes    Exercise: walking/biking at work (5165-3738 steps just at work)       PES statement:      (NC-1.4) Altered GI Function related to Alteration in gastrointestinal tract structure and/or function/ Decreased functional length of the GI tract as evidenced by Weight loss of 17% initial body weight; sleeve gastrectomy    Intervention    Discussion  1. Discussed 3 Months Post-Op Nutritional Guidelines for LSG  2. Recommended to consume 15-20gm protein at 3 meals daily, along with protein supplement/\"planned protein containing snack\" of 15-30gm protein, to reach goal of 60-80 gm protein daily.  3. Educated on post-op vitamin regimen: Multi Vit + iron 2x/day, calcium citrate 400-600 mg 2x/day, 6148-0285 mcg of Sublingual B-12 daily, and 5000 IU Vitamin D3 daily (MVI and calcium can be taken at the same time BID)  4. Reviewed lean protein sources  5. Bariatric Plate Method-  including lean/low fat protein at each meal, including a vegetable/fruit, and limiting carbohydrate intake to less than 25% of plate volume.     Instructions  1. Include 15-20gm protein at each meal, along with protein supplement/\"planned protein containing snack\" of 15-30gm protein, to reach goal of 60-80 gm protein daily.  2. Increase fluid intake to 64oz daily: choose plain or calorie/carbonation-free beverages.  3. Incorporate daily structured activity, 30-60 minutes most days of the week  4. Recommended pt to start taking: Multi Vit + iron 2x/day, calcium citrate 400-600 mg 2x/day, 9625-6132 mcg of Sublingual B-12 daily, and 5000 IU Vitamin D3 daily. (MVI and calcium can be taken at the same time)  5. Read food labels more consistently: keeping total fat grams <10, total sugar grams <10, fiber >3gm per serving.  6. Increase vegetable/fruit intake, by having a vegetable or fruit with each meal daily.  7. Practice plate method: 1/2 plate lean/low fat protein " source, vegetable/fruit, <25% of plate complex carbohydrates.  8. Separate fluids 30 minutes before/after meal times.  9. Practice eating off of smaller plates/bowls, chewing to applesauce consistency, taking 20-30 minutes to eat in a calm/relaxed environment without distractions of tv/email/cell phone.    Handouts provided:  3 months Post-Op Nutritional Guidelines for LSG    Assessment/Plan:    Pt to follow up for 6 months post-op visit with bariatrician       Video-Visit Details    Type of service:  Video Visit    Video End Time:8:15 AM    Originating Location (pt. Location): Home    Distant Location (provider location):  [facility] [place]     Platform used for Video Visit: Melodie Cerda RD          Again, thank you for allowing me to participate in the care of your patient.        Sincerely,        Zaina Cerda RD

## 2021-09-18 ENCOUNTER — HEALTH MAINTENANCE LETTER (OUTPATIENT)
Age: 61
End: 2021-09-18

## 2021-10-04 ENCOUNTER — OFFICE VISIT (OUTPATIENT)
Dept: INTERNAL MEDICINE | Facility: CLINIC | Age: 61
End: 2021-10-04
Payer: COMMERCIAL

## 2021-10-04 VITALS
BODY MASS INDEX: 44.1 KG/M2 | DIASTOLIC BLOOD PRESSURE: 60 MMHG | WEIGHT: 281 LBS | OXYGEN SATURATION: 96 % | HEART RATE: 62 BPM | SYSTOLIC BLOOD PRESSURE: 108 MMHG | HEIGHT: 67 IN

## 2021-10-04 DIAGNOSIS — F32.1 MODERATE MAJOR DEPRESSION (H): ICD-10-CM

## 2021-10-04 DIAGNOSIS — E66.01 MORBID OBESITY (H): ICD-10-CM

## 2021-10-04 DIAGNOSIS — I10 ESSENTIAL HYPERTENSION: Primary | ICD-10-CM

## 2021-10-04 DIAGNOSIS — Z98.84 HISTORY OF GASTRIC BYPASS: ICD-10-CM

## 2021-10-04 PROBLEM — D62 ACUTE BLOOD LOSS ANEMIA: Status: ACTIVE | Noted: 2021-03-18

## 2021-10-04 PROBLEM — E78.5 HYPERLIPIDEMIA: Status: ACTIVE | Noted: 2020-12-07

## 2021-10-04 PROBLEM — N17.9 AKI (ACUTE KIDNEY INJURY) (H): Status: ACTIVE | Noted: 2021-03-18

## 2021-10-04 PROBLEM — N17.9 AKI (ACUTE KIDNEY INJURY) (H): Status: RESOLVED | Noted: 2021-03-18 | Resolved: 2021-10-04

## 2021-10-04 PROBLEM — Z86.0100 HISTORY OF COLON POLYPS: Status: ACTIVE | Noted: 2020-12-07

## 2021-10-04 PROBLEM — D62 ACUTE BLOOD LOSS ANEMIA: Status: RESOLVED | Noted: 2021-03-18 | Resolved: 2021-10-04

## 2021-10-04 PROBLEM — M54.50 LOW BACK PAIN: Status: ACTIVE | Noted: 2020-12-07

## 2021-10-04 PROBLEM — E55.9 VITAMIN D DEFICIENCY: Status: ACTIVE | Noted: 2020-12-07

## 2021-10-04 PROBLEM — R94.5 ABNORMAL LIVER FUNCTION: Status: ACTIVE | Noted: 2020-12-07

## 2021-10-04 PROBLEM — K58.9 IRRITABLE BOWEL SYNDROME: Status: ACTIVE | Noted: 2020-12-07

## 2021-10-04 PROBLEM — R31.29 MICROSCOPIC HEMATURIA: Status: ACTIVE | Noted: 2018-07-23

## 2021-10-04 PROBLEM — G44.229 CHRONIC TENSION-TYPE HEADACHE, NOT INTRACTABLE: Status: ACTIVE | Noted: 2019-06-10

## 2021-10-04 PROCEDURE — 99214 OFFICE O/P EST MOD 30 MIN: CPT | Performed by: INTERNAL MEDICINE

## 2021-10-04 RX ORDER — MAGNESIUM 200 MG
1000 TABLET ORAL DAILY
COMMUNITY
End: 2023-03-23

## 2021-10-04 RX ORDER — LISINOPRIL 10 MG/1
10 TABLET ORAL DAILY
Qty: 90 TABLET | Refills: 3 | Status: SHIPPED | OUTPATIENT
Start: 2021-10-04 | End: 2022-05-05

## 2021-10-04 RX ORDER — URSODIOL 300 MG/1
1 CAPSULE ORAL 2 TIMES DAILY
COMMUNITY
Start: 2021-08-31 | End: 2022-06-16

## 2021-10-04 ASSESSMENT — MIFFLIN-ST. JEOR: SCORE: 2048.24

## 2021-10-04 NOTE — PROGRESS NOTES
"   Assessment & Plan     Essential hypertension  Blood pressure is running lower since gastric bypass surgery.  Experiencing recurrent episodes of feeling lightheaded and dizzy especially when standing up.  He is on lisinopril/HCTZ.  He is not drinking much fluid since the surgery.  Will discontinue the medication and replaced with 10 mg of lisinopril daily.  He will monitor for any recurrent symptoms and he will return in 4 weeks to recheck his blood pressure which is 108/60 today.  - lisinopril (ZESTRIL) 10 MG tablet  Dispense: 90 tablet; Refill: 3    Morbid obesity (H)  Underwent gastric bypass surgery 3 months ago and has lost over 50 pounds.  Besides the above lightheaded sensation, feeling well.  No significant abdominal pain or nausea.    History of gastric bypass  As above    Moderate major depression (H)  Continues on citalopram.  Stable    Healthcare maintenance-we discussed getting his first Covid vaccine and he will go to his pharmacy to get this done.  He can delay his flu shot for 1 week.       BMI:   Estimated body mass index is 44.01 kg/m  as calculated from the following:    Height as of this encounter: 1.702 m (5' 7\").    Weight as of this encounter: 127.5 kg (281 lb).                Return in about 4 weeks (around 11/1/2021) for Follow up.    Long Weston MD  [facility]          Subjective       HPI 61-year-old male with hypertension who underwent gastric bypass surgery for morbid obesity.  He has lost over 50 pounds.  Recurrent episodes of feeling lightheaded and dizzy.  Evaluated at urgent care 2 weeks ago.  Lab work was normal.  Systolic blood pressure 100-110.  Not drinking as much fluid since surgery.  See assessment and plan for details of visit    Current Outpatient Medications   Medication     calcium citrate-vitamin D (CITRACAL) 315-200 MG-UNIT TABS per tablet     Cholecalciferol 125 MCG (5000 UT) TABS     citalopram (CELEXA) 20 MG tablet     " "cyanocobalamin (CYANOCOBALAMIN) 1000 MCG SUBL sublingual tablet     lisinopril (ZESTRIL) 10 MG tablet     omeprazole (PRILOSEC) 20 MG DR capsule     rosuvastatin (CRESTOR) 10 MG tablet     UNABLE TO FIND     ursodiol (ACTIGALL) 300 MG capsule     No current facility-administered medications for this visit.        Review of Systems  No chest pain or palpitations        Objective    Vitals:    10/04/21 0800   BP: 108/60   BP Location: Right arm   Patient Position: Sitting   Cuff Size: Adult Large   Pulse: 62   SpO2: 96%   Weight: 127.5 kg (281 lb)   Height: 1.702 m (5' 7\")        Physical Exam  Overweight but otherwise well-appearing middle-age male who has lost significant weight since his last visit      "

## 2021-10-19 PROBLEM — F32.9 MAJOR DEPRESSION: Status: ACTIVE | Noted: 2019-06-10

## 2021-11-09 ENCOUNTER — OFFICE VISIT (OUTPATIENT)
Dept: INTERNAL MEDICINE | Facility: CLINIC | Age: 61
End: 2021-11-09
Payer: COMMERCIAL

## 2021-11-09 ENCOUNTER — RECORDS - HEALTHEAST (OUTPATIENT)
Dept: ADMINISTRATIVE | Facility: OTHER | Age: 61
End: 2021-11-09

## 2021-11-09 VITALS
BODY MASS INDEX: 42.44 KG/M2 | WEIGHT: 280 LBS | HEIGHT: 68 IN | HEART RATE: 60 BPM | SYSTOLIC BLOOD PRESSURE: 128 MMHG | OXYGEN SATURATION: 98 % | DIASTOLIC BLOOD PRESSURE: 70 MMHG

## 2021-11-09 DIAGNOSIS — R20.0 NUMBNESS OF LEFT ANTERIOR THIGH: ICD-10-CM

## 2021-11-09 DIAGNOSIS — Z12.5 SCREENING FOR PROSTATE CANCER: ICD-10-CM

## 2021-11-09 DIAGNOSIS — E66.01 MORBID OBESITY (H): ICD-10-CM

## 2021-11-09 DIAGNOSIS — Z98.84 HISTORY OF GASTRIC BYPASS: ICD-10-CM

## 2021-11-09 DIAGNOSIS — Z86.0100 HISTORY OF COLON POLYPS: ICD-10-CM

## 2021-11-09 DIAGNOSIS — E55.9 VITAMIN D DEFICIENCY: ICD-10-CM

## 2021-11-09 DIAGNOSIS — I10 ESSENTIAL HYPERTENSION: ICD-10-CM

## 2021-11-09 DIAGNOSIS — F32.1 MODERATE MAJOR DEPRESSION (H): ICD-10-CM

## 2021-11-09 DIAGNOSIS — G47.33 OSA (OBSTRUCTIVE SLEEP APNEA): ICD-10-CM

## 2021-11-09 DIAGNOSIS — Z00.00 ROUTINE GENERAL MEDICAL EXAMINATION AT A HEALTH CARE FACILITY: Primary | ICD-10-CM

## 2021-11-09 DIAGNOSIS — E78.2 MIXED HYPERLIPIDEMIA: ICD-10-CM

## 2021-11-09 LAB
ALBUMIN SERPL-MCNC: 3.6 G/DL (ref 3.5–5)
ALP SERPL-CCNC: 65 U/L (ref 45–120)
ALT SERPL W P-5'-P-CCNC: 16 U/L (ref 0–45)
ANION GAP SERPL CALCULATED.3IONS-SCNC: 9 MMOL/L (ref 5–18)
AST SERPL W P-5'-P-CCNC: 17 U/L (ref 0–40)
BILIRUB SERPL-MCNC: 0.6 MG/DL (ref 0–1)
BUN SERPL-MCNC: 13 MG/DL (ref 8–22)
CALCIUM SERPL-MCNC: 9.7 MG/DL (ref 8.5–10.5)
CHLORIDE BLD-SCNC: 108 MMOL/L (ref 98–107)
CHOLEST SERPL-MCNC: 186 MG/DL
CO2 SERPL-SCNC: 27 MMOL/L (ref 22–31)
CREAT SERPL-MCNC: 0.87 MG/DL (ref 0.7–1.3)
ERYTHROCYTE [DISTWIDTH] IN BLOOD BY AUTOMATED COUNT: 13.5 % (ref 10–15)
FASTING STATUS PATIENT QL REPORTED: YES
GFR SERPL CREATININE-BSD FRML MDRD: >90 ML/MIN/1.73M2
GLUCOSE BLD-MCNC: 94 MG/DL (ref 70–125)
HCT VFR BLD AUTO: 45.1 % (ref 40–53)
HDLC SERPL-MCNC: 43 MG/DL
HGB BLD-MCNC: 14.7 G/DL (ref 13.3–17.7)
LDLC SERPL CALC-MCNC: 117 MG/DL
MCH RBC QN AUTO: 29.1 PG (ref 26.5–33)
MCHC RBC AUTO-ENTMCNC: 32.6 G/DL (ref 31.5–36.5)
MCV RBC AUTO: 89 FL (ref 78–100)
PLATELET # BLD AUTO: 196 10E3/UL (ref 150–450)
POTASSIUM BLD-SCNC: 4.4 MMOL/L (ref 3.5–5)
PROT SERPL-MCNC: 6.4 G/DL (ref 6–8)
PSA SERPL-MCNC: 0.69 UG/L (ref 0–3.5)
RBC # BLD AUTO: 5.06 10E6/UL (ref 4.4–5.9)
SODIUM SERPL-SCNC: 144 MMOL/L (ref 136–145)
TRIGL SERPL-MCNC: 128 MG/DL
WBC # BLD AUTO: 6.3 10E3/UL (ref 4–11)

## 2021-11-09 PROCEDURE — 99396 PREV VISIT EST AGE 40-64: CPT | Mod: 25 | Performed by: INTERNAL MEDICINE

## 2021-11-09 PROCEDURE — 90471 IMMUNIZATION ADMIN: CPT | Performed by: INTERNAL MEDICINE

## 2021-11-09 PROCEDURE — 80053 COMPREHEN METABOLIC PANEL: CPT | Performed by: INTERNAL MEDICINE

## 2021-11-09 PROCEDURE — G0103 PSA SCREENING: HCPCS | Performed by: INTERNAL MEDICINE

## 2021-11-09 PROCEDURE — 90682 RIV4 VACC RECOMBINANT DNA IM: CPT | Performed by: INTERNAL MEDICINE

## 2021-11-09 PROCEDURE — 82306 VITAMIN D 25 HYDROXY: CPT | Performed by: INTERNAL MEDICINE

## 2021-11-09 PROCEDURE — 36415 COLL VENOUS BLD VENIPUNCTURE: CPT | Performed by: INTERNAL MEDICINE

## 2021-11-09 PROCEDURE — 80061 LIPID PANEL: CPT | Performed by: INTERNAL MEDICINE

## 2021-11-09 PROCEDURE — 99214 OFFICE O/P EST MOD 30 MIN: CPT | Mod: 25 | Performed by: INTERNAL MEDICINE

## 2021-11-09 PROCEDURE — 85027 COMPLETE CBC AUTOMATED: CPT | Performed by: INTERNAL MEDICINE

## 2021-11-09 RX ORDER — CITALOPRAM HYDROBROMIDE 20 MG/1
20 TABLET ORAL DAILY
Qty: 90 TABLET | Refills: 3 | Status: SHIPPED | OUTPATIENT
Start: 2021-11-09 | End: 2023-01-12

## 2021-11-09 RX ORDER — ROSUVASTATIN CALCIUM 10 MG/1
10 TABLET, COATED ORAL DAILY
Qty: 90 TABLET | Refills: 3 | Status: SHIPPED | OUTPATIENT
Start: 2021-11-09 | End: 2023-01-12

## 2021-11-09 SDOH — HEALTH STABILITY: PHYSICAL HEALTH: ON AVERAGE, HOW MANY DAYS PER WEEK DO YOU ENGAGE IN MODERATE TO STRENUOUS EXERCISE (LIKE A BRISK WALK)?: 5 DAYS

## 2021-11-09 SDOH — ECONOMIC STABILITY: INCOME INSECURITY: HOW HARD IS IT FOR YOU TO PAY FOR THE VERY BASICS LIKE FOOD, HOUSING, MEDICAL CARE, AND HEATING?: NOT HARD AT ALL

## 2021-11-09 SDOH — HEALTH STABILITY: PHYSICAL HEALTH: ON AVERAGE, HOW MANY MINUTES DO YOU ENGAGE IN EXERCISE AT THIS LEVEL?: 60 MIN

## 2021-11-09 SDOH — ECONOMIC STABILITY: INCOME INSECURITY: IN THE LAST 12 MONTHS, WAS THERE A TIME WHEN YOU WERE NOT ABLE TO PAY THE MORTGAGE OR RENT ON TIME?: NO

## 2021-11-09 SDOH — ECONOMIC STABILITY: FOOD INSECURITY: WITHIN THE PAST 12 MONTHS, YOU WORRIED THAT YOUR FOOD WOULD RUN OUT BEFORE YOU GOT MONEY TO BUY MORE.: NEVER TRUE

## 2021-11-09 SDOH — ECONOMIC STABILITY: TRANSPORTATION INSECURITY
IN THE PAST 12 MONTHS, HAS LACK OF TRANSPORTATION KEPT YOU FROM MEETINGS, WORK, OR FROM GETTING THINGS NEEDED FOR DAILY LIVING?: NO

## 2021-11-09 SDOH — ECONOMIC STABILITY: TRANSPORTATION INSECURITY
IN THE PAST 12 MONTHS, HAS THE LACK OF TRANSPORTATION KEPT YOU FROM MEDICAL APPOINTMENTS OR FROM GETTING MEDICATIONS?: NO

## 2021-11-09 SDOH — ECONOMIC STABILITY: FOOD INSECURITY: WITHIN THE PAST 12 MONTHS, THE FOOD YOU BOUGHT JUST DIDN'T LAST AND YOU DIDN'T HAVE MONEY TO GET MORE.: NEVER TRUE

## 2021-11-09 ASSESSMENT — SOCIAL DETERMINANTS OF HEALTH (SDOH)
DO YOU BELONG TO ANY CLUBS OR ORGANIZATIONS SUCH AS CHURCH GROUPS UNIONS, FRATERNAL OR ATHLETIC GROUPS, OR SCHOOL GROUPS?: YES
HOW OFTEN DO YOU ATTEND CHURCH OR RELIGIOUS SERVICES?: 1 TO 4 TIMES PER YEAR
IN A TYPICAL WEEK, HOW MANY TIMES DO YOU TALK ON THE PHONE WITH FAMILY, FRIENDS, OR NEIGHBORS?: MORE THAN THREE TIMES A WEEK
HOW OFTEN DO YOU GET TOGETHER WITH FRIENDS OR RELATIVES?: ONCE A WEEK

## 2021-11-09 ASSESSMENT — LIFESTYLE VARIABLES
HOW MANY STANDARD DRINKS CONTAINING ALCOHOL DO YOU HAVE ON A TYPICAL DAY: PATIENT DECLINED
HOW OFTEN DO YOU HAVE A DRINK CONTAINING ALCOHOL: NEVER
HOW OFTEN DO YOU HAVE SIX OR MORE DRINKS ON ONE OCCASION: PATIENT DECLINED

## 2021-11-09 ASSESSMENT — MIFFLIN-ST. JEOR: SCORE: 2059.57

## 2021-11-09 NOTE — PROGRESS NOTES
SUBJECTIVE:   CC: Nick Coley is an 61 year old male who presents for preventative health visit.     HPI-in addition to his annual preventive visit, Nick is here to follow-up chronic medical problems including morbid obesity with recent gastric bypass surgery, hypertension, sleep apnea, hyperlipidemia and depression.  See assessment and plan for details.    Patient has been advised of split billing requirements and indicates understanding: Yes  Healthy Habits:     Getting at least 3 servings of Calcium per day:  Yes    Bi-annual eye exam:  Yes    Dental care twice a year:  NO    Sleep apnea or symptoms of sleep apnea:  Sleep apnea    Diet:  Other    Frequency of exercise:  2-3 days/week    Duration of exercise:  15-30 minutes    Taking medications regularly:  Yes    Barriers to taking medications:  None    Medication side effects:  None    PHQ-2 Total Score: 0    Additional concerns today:  No              Today's PHQ-2 Score:   PHQ-2 ( 1999 [employer]) 11/6/2021   Q1: Little interest or pleasure in doing things 0   Q2: Feeling down, depressed or hopeless 0   PHQ-2 Score 0   Q1: Little interest or pleasure in doing things Not at all   Q2: Feeling down, depressed or hopeless Not at all   PHQ-2 Score 0       Abuse: Current or Past(Physical, Sexual or Emotional)- No  Do you feel safe in your environment? Yes        Social History     Tobacco Use     Smoking status: Never Smoker     Smokeless tobacco: Never Used     Tobacco comment: cigar once or twice a year   Substance Use Topics     Alcohol use: Yes     Comment: Alcoholic Drinks/day: just during softball season or vacation-0-2     If you drink alcohol do you typically have >3 drinks per day or >7 drinks per week? No    Alcohol Use 11/9/2021   Prescreen: >3 drinks/day or >7 drinks/week? -   Prescreen: >3 drinks/day or >7 drinks/week? No       Last PSA:   Prostate Specific Antigen Screen   Date Value Ref Range Status   11/09/2021 0.69 0.00 - 3.50 ug/L Final        Reviewed orders with patient. Reviewed health maintenance and updated orders accordingly - Yes  Lab work is in process    Reviewed and updated as needed this visit by clinical staff  Tobacco  Allergies  Meds  Problems  Med Hx  Surg Hx  Fam Hx         Reviewed and updated as needed this visit by Provider  Tobacco  Allergies  Meds  Problems  Med Hx  Surg Hx  Fam Hx        Past Medical History:   Diagnosis Date     Abnormal LFTs (liver function tests) 12/18/2015    Negative hepatitis C antibody and ferritin of 330 6 December 2015, steatohepatitis suspected     Acute blood loss anemia 03/18/2021     WHIT (acute kidney injury) (H) 03/18/2021     Benign essential hypertension      Cellulitis of left lower extremity 05/29/2020     Cellulitis, face 2014     Chronic kidney disease      Chronic tension-type headache, not intractable 06/10/2019     Depression with anxiety      Essential hypertension 12/18/2015     Fatty liver     ultrasound 2001 demonstrated.     Hematoma of leg, left, subsequent encounter 07/23/2018     History of colon polyps     Colonoscopy November 2018 with multiple polyps removed, repeat in 3 years     History of gastric bypass     Normal sleeve gastrectomy June 2021     Hyperlipidemia      Irritable bowel syndrome      Kidney stones     x 4     LBP (low back pain)      Microscopic hematuria 07/23/2018     Moderate major depression (H) 06/10/2019     Morbid obesity (H) 12/18/2015    Normal sleeve gastrectomy June 2021     Nephrolithiasis 12/2020    Right     Nephrolithiasis     2013, 2009, 2001.  Right percutaneous nephrolithotomy March 2021.  Stone analysis 90% uric acid and 10% calcium oxalate     Numbness of left anterior thigh 11/9/2021    Numbness and burning left anterior thigh above his knee     ANAM (obstructive sleep apnea) 01/2021    Severe     Sleep apnea     uses cpap     Suspected sleep apnea 11/16/2018     Tear of meniscus of right knee 12/18/2015    Associated with right  "tibial plateau fracture 2013, arthroscopic knee surgery December 2015     Vitamin D deficiency     Vitamin D 10.0 November 2018     Wrist fracture       Past Surgical History:   Procedure Laterality Date     CARPAL TUNNEL RELEASE RT/LT Right 2001     compound fracture Right     age12, right arm     EXTRACORPOREAL SHOCK WAVE LITHOTRIPSY  2013    for kidney stones     HC KNEE SCOPE,SINGLE MENISECTOMY Right 12/22/2015    Procedure: RIGHT KNEE ARTHROSCOPIC MENISCECTOMY;  Surgeon: Chandu Moyer MD;  Location: [facility] OR;  Service: Orthopedics     KIDNEY STONE SURGERY Right     Right percutaneous nephrolithotomy March 2021     OTHER SURGICAL HISTORY      arm fracture repair     OR LAP, CHIDI RESTRICT PROC, LONGITUDINAL GASTRECTOMY N/A 6/15/2021    Procedure: GASTRECTOMY, SLEEVE, LAPAROSCOPIC;  Surgeon: Aubrey Olmos MD;  Location: [facility] OR;  Service: General     RELEASE CARPAL TUNNEL Right 2001     WISDOM TOOTH EXTRACTION  2013       Review of Systems  12 point review of systems is negative other than what is discussed in the assessment and plan    OBJECTIVE:   /70 (BP Location: Right arm, Patient Position: Sitting, Cuff Size: Adult Large)   Pulse 60   Ht 1.727 m (5' 8\")   Wt 127 kg (280 lb)   SpO2 98%   BMI 42.57 kg/m      Physical Exam  EYES: Eyelids, conjunctiva, and sclera were normal. Pupils were normal. Cornea, iris, and lens were normal bilaterally.  HEAD, EARS, NOSE, MOUTH, AND THROAT: Head and face were normal. TMs and external auditory canals are normal  NECK: Neck appearance was normal. There were no neck masses and the thyroid was not enlarged and no nodules are felt.  No lymphadenopathy.  RESPIRATORY: Breathing pattern was normal and the chest moved symmetrically.   Lung sounds were normal and there were no rales or wheezes.  CARDIOVASCULAR: Heart rate and rhythm were normal.  S1 and S2 were normal and there were no extra sounds or murmurs. Peripheral pulses in arms and legs " were normal.  Jugular venous pressure was normal.  There was no peripheral edema.  No carotid bruits.  GASTROINTESTINAL:  Bowel sounds were present.   Palpation detected no tenderness, mass, or enlarged organs.   RECTAL/PROSTATE: No external lesions.  Sphincter tone normal.  No palpable rectal lesions.  Prostate normal size, smooth, nontender without nodules  MUSCULOSKELETAL: Skeletal configuration was normal and muscle mass was normal for age. Joint appearance was overall normal.  LYMPHATIC: There were no enlarged nodes.  SKIN/HAIR/NAILS: Skin color was normal.  Hair and nails were normal.There were no skin lesions.  NEUROLOGIC: The patient was alert and oriented to person, place, time, and circumstance. Speech was normal. Cranial nerves were normal. Motor strength was normal for age. The patient was normally coordinated.  Sensation intact.  PSYCHIATRIC:  Mood and affect were normal and the patient had normal recent and remote memory. The patient's judgment and insight were normal.        ASSESSMENT/PLAN:   1. Routine general medical examination at a health care facility  Immunizations are reviewed and providing flu shot.  Recommending Shingrix.  Living will discussed.  Non-smoker.  Uses alcohol in moderation.  Regular exercise discussed.  He is due to get his colonoscopy completed and he will get this scheduled.  Prostate exam is normal and I will check a PSA for prostate cancer screening.   He sees his ophthalmologist every year. Skin exam performed and recommending regular use of sunblock.  Hepatitis C antibody for screening was normal.  Will screen for diabetes with fasting glucose.       2. Morbid obesity (H)  Successful gastric bypass surgery earlier this year.  He has lost over 50 pounds but weight loss seems to have stabilized.  We will continue to work at diet restriction and getting regular exercise.  He will follow up with bariatric clinic as well.    3. Moderate major depression (H)  Mood is stable  taking citalopram  - citalopram (CELEXA) 20 MG tablet; Take 1 tablet (20 mg) by mouth daily  Dispense: 90 tablet; Refill: 3    4. History of gastric bypass  As above    5. Essential hypertension  Hydrochlorothiazide discontinued at last visit as he was experiencing recurrent episodes of feeling lightheaded and dizzy.  The symptoms have resolved and he is doing well taking only lisinopril 10 mg daily with good blood pressure control  - CBC with platelets; Future  - Comprehensive metabolic panel (BMP + Alb, Alk Phos, ALT, AST, Total. Bili, TP); Future  - UA Macro with Reflex to Micro and Culture - lab collect; Future  - CBC with platelets  - Comprehensive metabolic panel (BMP + Alb, Alk Phos, ALT, AST, Total. Bili, TP)    6. ANAM (obstructive sleep apnea)  Diagnosed with sleep apnea in 2020.  He is trying to use CPAP more faithfully    7. Mixed hyperlipidemia  10-year calculated risk for ASCVD was over 14%.  He has no on rosuvastatin for the past year.  Tolerating without side effects.  Recheck lipid profile.  - rosuvastatin (CRESTOR) 10 MG tablet; Take 1 tablet (10 mg) by mouth daily  Dispense: 90 tablet; Refill: 3  - Lipid Profile (Chol, Trig, HDL, LDL calc); Future  - Lipid Profile (Chol, Trig, HDL, LDL calc)    8. Numbness of left anterior thigh  Recent burning and numbness in distal left anterior thigh.  We discussed meralgia paresthetica or L3 radiculopathy as cause.  He will monitor.    9. Vitamin D deficiency  Recheck vitamin D level on replacement  - 25 OH Vit D therapy monitoring; Future  - 25 OH Vit D therapy monitoring    10. History of colon polyps  Due for colonoscopy and he will get this scheduled    11. Screening for prostate cancer    - PSA, screen; Future  - PSA, screen    Patient has been advised of split billing requirements and indicates understanding: Yes  COUNSELING:   Reviewed preventive health counseling, as reflected in patient instructions       Regular exercise       Healthy diet/nutrition    "    Vision screening       Colon cancer screening       Prostate cancer screening    Estimated body mass index is 42.57 kg/m  as calculated from the following:    Height as of this encounter: 1.727 m (5' 8\").    Weight as of this encounter: 127 kg (280 lb).         He reports that he has never smoked. He has never used smokeless tobacco.      Counseling Resources:  ATP IV Guidelines  Pooled Cohorts Equation Calculator  FRAX Risk Assessment  ICSI Preventive Guidelines  Dietary Guidelines for Americans, 2010  USDA's MyPlate  ASA Prophylaxis  Lung CA Screening    Long Weston MD  [facility]  "

## 2021-11-09 NOTE — PATIENT INSTRUCTIONS
Preventive Health Recommendations  Male Ages 50 - 64    Yearly exam:             See your health care provider every year in order to  o   Review health changes.   o   Discuss preventive care.    o   Review your medicines if your doctor has prescribed any.     Cholesterol screening annually    Annual diabetes screening    You are due to have your colonoscopy repeated.  Please call [facility] to schedule.      Prostate cancer screening annually with PSA and exam    You should be tested each year for STDs (sexually transmitted diseases), if you re at risk.     Shots: Get a flu shot each year. Get a tetanus shot every 10 years.  I would recommend getting the shingles vaccine, Shingrix completed.  This can be obtained at your pharmacy.    Nutrition:    Eat at least 5 servings of fruits and vegetables daily.     Eat whole-grain bread, whole-wheat pasta and brown rice instead of white grains and rice.     Get adequate Calcium and Vitamin D.     Lifestyle    Exercise for at least 150 minutes a week (30 minutes a day, 5 days a week). This will help you control your weight and prevent disease.     Limit alcohol to one drink per day.     No smoking.     Wear sunscreen to prevent skin cancer.     See your dentist every six months for an exam and cleaning.     See your eye doctor every 1 to 2 years.

## 2021-11-15 LAB
DEPRECATED CALCIDIOL+CALCIFEROL SERPL-MC: <46 UG/L (ref 20–75)
VITAMIN D2 SERPL-MCNC: <5 UG/L
VITAMIN D3 SERPL-MCNC: 41 UG/L

## 2021-11-17 ENCOUNTER — MYC MEDICAL ADVICE (OUTPATIENT)
Dept: SURGERY | Facility: CLINIC | Age: 61
End: 2021-11-17
Payer: COMMERCIAL

## 2021-11-17 DIAGNOSIS — Z98.84 BARIATRIC SURGERY STATUS: ICD-10-CM

## 2021-11-17 DIAGNOSIS — Z90.3 HISTORY OF SLEEVE GASTRECTOMY: Primary | ICD-10-CM

## 2021-11-17 NOTE — TELEPHONE ENCOUNTER
Internal 6 months post-op Sleeve labs needed for appointment with Irvin Branch MD on Friday, Dec 17th. Patient just did some of the labs with his PCP so I didn't repeat any of those.  Shirlene Vasquez RN

## 2021-11-23 ENCOUNTER — LAB (OUTPATIENT)
Dept: LAB | Facility: CLINIC | Age: 61
End: 2021-11-23
Payer: COMMERCIAL

## 2021-11-23 DIAGNOSIS — I10 ESSENTIAL HYPERTENSION: ICD-10-CM

## 2021-11-23 DIAGNOSIS — Z98.84 BARIATRIC SURGERY STATUS: ICD-10-CM

## 2021-11-23 DIAGNOSIS — Z90.3 HISTORY OF SLEEVE GASTRECTOMY: ICD-10-CM

## 2021-11-23 LAB
ALBUMIN UR-MCNC: NEGATIVE MG/DL
APPEARANCE UR: CLEAR
BILIRUB UR QL STRIP: NEGATIVE
COLOR UR AUTO: YELLOW
FERRITIN SERPL-MCNC: 54 NG/ML (ref 27–300)
FOLATE SERPL-MCNC: 9.8 NG/ML
GLUCOSE UR STRIP-MCNC: NEGATIVE MG/DL
HGB UR QL STRIP: NEGATIVE
KETONES UR STRIP-MCNC: NEGATIVE MG/DL
LEUKOCYTE ESTERASE UR QL STRIP: NEGATIVE
NITRATE UR QL: NEGATIVE
PH UR STRIP: 5.5 [PH] (ref 5–8)
PTH-INTACT SERPL-MCNC: 31 PG/ML (ref 10–86)
SP GR UR STRIP: >=1.03 (ref 1–1.03)
UROBILINOGEN UR STRIP-ACNC: 0.2 E.U./DL
VIT B12 SERPL-MCNC: 415 PG/ML (ref 213–816)

## 2021-11-23 PROCEDURE — 81003 URINALYSIS AUTO W/O SCOPE: CPT

## 2021-11-23 PROCEDURE — 82607 VITAMIN B-12: CPT

## 2021-11-23 PROCEDURE — 36415 COLL VENOUS BLD VENIPUNCTURE: CPT

## 2021-11-23 PROCEDURE — 83970 ASSAY OF PARATHORMONE: CPT

## 2021-11-23 PROCEDURE — 84590 ASSAY OF VITAMIN A: CPT | Mod: 90

## 2021-11-23 PROCEDURE — 84630 ASSAY OF ZINC: CPT | Mod: 90

## 2021-11-23 PROCEDURE — 82746 ASSAY OF FOLIC ACID SERUM: CPT

## 2021-11-23 PROCEDURE — 84425 ASSAY OF VITAMIN B-1: CPT | Mod: 90

## 2021-11-23 PROCEDURE — 99000 SPECIMEN HANDLING OFFICE-LAB: CPT

## 2021-11-23 PROCEDURE — 82728 ASSAY OF FERRITIN: CPT

## 2021-11-26 LAB
ANNOTATION COMMENT IMP: NORMAL
RETINYL PALMITATE SERPL-MCNC: <0.02 MG/L
VIT A SERPL-MCNC: 0.44 MG/L
VIT B1 PYROPHOSHATE BLD-SCNC: 131 NMOL/L

## 2021-11-27 LAB — ZINC SERPL-MCNC: 71 UG/DL

## 2021-12-17 ENCOUNTER — VIRTUAL VISIT (OUTPATIENT)
Dept: SURGERY | Facility: CLINIC | Age: 61
End: 2021-12-17
Payer: COMMERCIAL

## 2021-12-17 VITALS — BODY MASS INDEX: 42.13 KG/M2 | HEIGHT: 68 IN | WEIGHT: 278 LBS

## 2021-12-17 DIAGNOSIS — Z90.3 HISTORY OF SLEEVE GASTRECTOMY: ICD-10-CM

## 2021-12-17 DIAGNOSIS — K91.2 POSTOPERATIVE MALABSORPTION: Primary | ICD-10-CM

## 2021-12-17 DIAGNOSIS — I10 HYPERTENSION, UNSPECIFIED TYPE: ICD-10-CM

## 2021-12-17 PROCEDURE — 99215 OFFICE O/P EST HI 40 MIN: CPT | Mod: 95 | Performed by: EMERGENCY MEDICINE

## 2021-12-17 ASSESSMENT — MIFFLIN-ST. JEOR: SCORE: 2050.5

## 2021-12-17 NOTE — PROGRESS NOTES
Video visit follow up:  8:06 AM  Bariatric Follow Up Visit with a History of Previous Bariatric Surgery     Date of visit: 12/17/2021  Physician: Irvin Branch MD, MD  Primary Care Provider:  [facility]  Nick Coley   61 year old  male    Date of Surgery: 6/15/21  Initial Weight: 351 lbs  Initial BMI: 54.3  Today's Weight:   Wt Readings from Last 1 Encounters:   11/09/21 127 kg (280 lb)   BP at PCP office on 11/09/21 was 128/70.  Weight history:   Wt Readings from Last 4 Encounters:   11/09/21 127 kg (280 lb)   10/04/21 127.5 kg (281 lb)   09/14/21 129.3 kg (285 lb)   06/15/21 149.6 kg (329 lb 11.2 oz)    preop weight 329 lbs.   There is no height or weight on file to calculate BMI.      Assessment and Plan     Assessment: Nick is a 61 year old year old male who is 6 months s/p  Sleeve Gastrectomy with Dr. Olmos.  Weight loss has been slower than average and on review of habits/meals this may stem from some on the go eating/snacking but overall is having good improvement in BP control with medication reduction. Will continue cpap use and once his holiday job ends in January, will focus on getting back to the gym/personal fitness goals to help restore some of his lost muscle mass.  Vitamin use is good. Omeprazole can continue and will finish off the Ursodiol and be done with it..    Nick Coley feels as if he has achieved the goals he hoped to accomplish through bariatric surgery and weight loss.    No diagnosis found.      Current Outpatient Medications:      calcium citrate-vitamin D (CITRACAL) 315-200 MG-UNIT TABS per tablet, Take 2 tablets by mouth daily, Disp: , Rfl:      Cholecalciferol 125 MCG (5000 UT) TABS, , Disp: , Rfl:      citalopram (CELEXA) 20 MG tablet, Take 1 tablet (20 mg) by mouth daily, Disp: 90 tablet, Rfl: 3     cyanocobalamin (CYANOCOBALAMIN) 1000 MCG SUBL sublingual tablet, Place 1,000 mcg under the tongue daily, Disp: , Rfl:      lisinopril (ZESTRIL) 10 MG tablet, Take 1  tablet (10 mg) by mouth daily, Disp: 90 tablet, Rfl: 3     omeprazole (PRILOSEC) 20 MG DR capsule, Take 1 capsule (20 mg) by mouth daily, Disp: 90 capsule, Rfl: 0     rosuvastatin (CRESTOR) 10 MG tablet, Take 1 tablet (10 mg) by mouth daily, Disp: 90 tablet, Rfl: 3     UNABLE TO FIND, MEDICATION NAME: Men One A Day Multivitamin, Disp: , Rfl:      ursodiol (ACTIGALL) 300 MG capsule, Take 1 capsule by mouth 2 times daily, Disp: , Rfl:     Plan:  1. Continue focus on 3 meals daily, about every 4-5 hours and supplement w/ protein drinks as needed to hit that 80g/day goal.  Hydrate between meals but a bit slower to prevent some of the overwhelming of your pouch that you've experienced.  2. Continue omeprazole this winter and if heart burn continues to be well controlled, you could cut back to every other day for 2 weeks then every 3rd day for 2 weeks and then stop come spring time and we can re-evaluate your needs if going well at our June visit.  3. Follow up with dietician in 2-3 months.  4. Consider change to Equate compete multivitamin with Iron. Twice daily dosing works well for most. B12, D3 can continue at current doses and calcium can reduce to once daily at this point.  Recheck labs at our next visit in June.  5. Blood pressure doing well on just lisinopril now and follow up with your regular clinic annually.   6. Continue regular cpap use.    No follow-ups on file.    Bariatric Surgery Review     Interim History/LifeChanges: had some initial dizziness early on, adjusted BP meds and improved. GERD early on but better now.     Patient Concerns: some trouble w/ water intake last couple months. Notices that smaller cups are needed.  Appetite (1-10): good. Stomach feels wagner at times and as above can affect hydration.   GERD: rarely.    Reviewed whether any need/indication for screening EGD today and we will deferred.  Typically, a screening EGD is recommend post op year 2-3 if no symptoms to assess health of  "esophagus/bariatric surgery and sooner if difficult to control GERD or persistent pain/dysphagia sx despite behavior modification.    Medication changes: off hydrochlorothiazide and on 10mg lisinopril now.     Vitamin Intake:   B-12   SL 1000mcg uses daily.   MVI  flintstones complete w/ iron. Once daily.   Vitamin D  yes D3, 125mcg.   Calcium   citrate. Once daily.     Other  occ protein shake.              LABS: \"Reviewed  Sometimes will overwhelm capacity of stomach w/ liquids, likely speed of drinking related. Will avoid chugging.  Nausea no  Vomiting no  Constipation no  Diarrhea no longer. Resolved.  Rashes no  Hair Loss no.   Reactive Hypoglycemia no  Light Headedness no   Moods good.      Most recent labs:  Lab Results   Component Value Date    WBC 6.3 11/09/2021    HGB 14.7 11/09/2021    HCT 45.1 11/09/2021    MCV 89 11/09/2021     11/09/2021     Lab Results   Component Value Date    CHOL 186 11/09/2021     Lab Results   Component Value Date    HDL 43 11/09/2021     No components found for: LDLCALC  Lab Results   Component Value Date    TRIG 128 11/09/2021     No components found for: CHOLHDL  Lab Results   Component Value Date    ALT 16 11/09/2021    AST 17 11/09/2021    ALKPHOS 65 11/09/2021     No results found for: HGBA1C  No components found for: [id number]  No components found for: [id number]  No components found for: FERRITIN  No components found for: PTH  No components found for: 47320  No components found for: 7597  Lab Results   Component Value Date    TSH 0.98 09/25/2020     No results found for: TESTOSTERONE    Habits:  Tobacco/Nicotine/THC exposure? no   NSAID use? Not using baby ASA.   Alcohol use? Not yet.   Caffeine Habits? 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 Has home treadmill. [employer].  3 meals/day? Yes. Very busy w/ 2 jobs during winter.  Protein 60-80g/day? Yes. Getting 2 " "daily.  Water Separate from meals? yes  Calorie Containing Beverages: no. Few PB crackers for a snack. Almonds/trailmix.   Restaurant eating/wk: n/a  Sleep Habits:  good  CPAP Use? yes  Contraception: na  DEXA:no.  Discussed annual screening to start at age 45 and continue to age 55 if scoring \"low risk\". DEXA scan recommended at age 55 regardless as long as at least 2 years have transpired from their bariatric surgery.    Social History     Social History     Socioeconomic History     Marital status:      Spouse name: Not on file     Number of children: Not on file     Years of education: Not on file     Highest education level: Not on file   Occupational History     Not on file   Tobacco Use     Smoking status: Never Smoker     Smokeless tobacco: Never Used     Tobacco comment: cigar once or twice a year   Substance and Sexual Activity     Alcohol use: Not Currently     Comment: Alcoholic Drinks/day: just during softball season or vacation-0-2     Drug use: No     Sexual activity: Yes     Partners: Female   Other Topics Concern     Not on file   Social History Narrative    , 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      Social Determinants of Health     Financial Resource Strain: Low Risk      Difficulty of Paying Living Expenses: Not hard at all   Food Insecurity: No Food Insecurity     Worried About Running Out of Food in the Last Year: Never true     Ran Out of Food in the Last Year: Never true   Transportation Needs: No Transportation Needs     Lack of Transportation (Medical): No     Lack of Transportation (Non-Medical): No   Physical Activity: Sufficiently Active     Days of Exercise per Week: 5 days     Minutes of Exercise per Session: 60 min   Stress: No Stress Concern Present     Feeling of Stress : Not at all   Social Connections: Socially Integrated     Frequency of Communication with Friends and Family: More than three times a week     Frequency of Social Gatherings with Friends and Family: Once a week     " Attends Voodoo Services: 1 to 4 times per year     Active Member of Clubs or Organizations: Yes     Attends Club or Organization Meetings: Not on file     Marital Status:    Intimate Partner Violence: Not on file   Housing Stability: Low Risk      Unable to Pay for Housing in the Last Year: No     Number of Places Lived in the Last Year: 1     Unstable Housing in the Last Year: No       Past Medical History     Past Medical History:   Diagnosis Date     Abnormal LFTs (liver function tests) 12/18/2015    Negative hepatitis C antibody and ferritin of 330 6 December 2015, steatohepatitis suspected     Acute blood loss anemia 03/18/2021     WHIT (acute kidney injury) (H) 03/18/2021     Benign essential hypertension      Cellulitis of left lower extremity 05/29/2020     Cellulitis, face 2014     Chronic kidney disease      Chronic tension-type headache, not intractable 06/10/2019     Depression with anxiety      Essential hypertension 12/18/2015     Fatty liver     ultrasound 2001 demonstrated.     Hematoma of leg, left, subsequent encounter 07/23/2018     History of colon polyps     Colonoscopy November 2018 with multiple polyps removed, repeat in 3 years     History of gastric bypass     Normal sleeve gastrectomy June 2021     Hyperlipidemia      Irritable bowel syndrome      Kidney stones     x 4     LBP (low back pain)      Microscopic hematuria 07/23/2018     Moderate major depression (H) 06/10/2019     Morbid obesity (H) 12/18/2015    Normal sleeve gastrectomy June 2021     Nephrolithiasis 12/2020    Right     Nephrolithiasis     2013, 2009, 2001.  Right percutaneous nephrolithotomy March 2021.  Stone analysis 90% uric acid and 10% calcium oxalate     Numbness of left anterior thigh 11/9/2021    Numbness and burning left anterior thigh above his knee     ANAM (obstructive sleep apnea) 01/2021    Severe     Sleep apnea     uses cpap     Suspected sleep apnea 11/16/2018     Tear of meniscus of right knee  12/18/2015    Associated with right tibial plateau fracture 2013, arthroscopic knee surgery December 2015     Vitamin D deficiency     Vitamin D 10.0 November 2018     Wrist fracture      Problem List     Patient Active Problem List   Diagnosis     Snoring     Shifting sleep-work schedule     ANAM (obstructive sleep apnea)     Abnormal liver function     Calculus of kidney     Chronic tension-type headache, not intractable     Essential hypertension     History of colon polyps     Hyperlipidemia     Irritable bowel syndrome     Low back pain     Microscopic hematuria     Vitamin D deficiency     Nephrolithiasis     Moderate major depression (H)     Fatty liver     Morbid obesity (H)     History of gastric bypass     Numbness of left anterior thigh     Medications     [unfilled]  Surgical History     Past Surgical History  He has a past surgical history that includes compound fracture (Right); carpal tunnel release rt/lt (Right, 2001); Release carpal tunnel (Right, 2001); Extracorporeal Shock Wave Lithotripsy (2013); KNEE SCOPE,MED-LAT MENISECTOMY (Right, 12/22/2015); Bronson Tooth Extraction (2013); Kidney Stone Surgery (Right); other surgical history; and Pr Lap, Lisa Restrict Proc, Longitudinal Gastrectomy (N/A, 6/15/2021).    Objective-Exam     Constitutional:  There were no vitals taken for this visit.  [unfilled]   General:  Pleasant and in no acute distress   Eyes:  EOMI  ENT:  Airway patent  Normal voice. No cough.      Counseling     We reviewed the important post op bariatric recommendations:  -eating 3 meals daily  -eating protein first, getting >60gm protein daily  -eating slowly, chewing food well  -avoiding/limiting calorie containing beverages  -drinking water 15-30 minutes before or after meals  -limiting restaurant or cafeteria eating to twice a week or less    We discussed the importance of restorative sleep and stress management in maintaining a healthy weight.  We discussed the National  Weight Control Registry healthy weight maintenance strategies and ways to optimize metabolism.  We discussed the importance of physical activity including cardiovascular and strength training in maintaining a healthier weight.    We discussed the importance of life-long vitamin supplementation and life-long  follow-up.    Nick was reminded that, to avoid marginal ulcers he should avoid tobacco at all, alcohol in excess, caffeine in excess, and NSAIDS (unless indicated for cardioprotection or othewise and opposed by a PPI).    Irvin Branch MD    [facility].  12/17/2021  8:06 AM  [phone] (clinic phone)  [phone] (fax)    No images are attached to the encounter.  Medical Decision Making:    Converted to phone visit due to no audio on video visit.    30 minutes spent on the date of the encounter doing chart review, history and exam, documentation and further activities per the note

## 2021-12-17 NOTE — PATIENT INSTRUCTIONS
Plan:  1. Continue focus on 3 meals daily, about every 4-5 hours and supplement w/ protein drinks as needed to hit that 80g/day goal.  Hydrate between meals but a bit slower to prevent some of the overwhelming of your pouch that you've experienced.  2. Continue omeprazole this winter and if heart burn continues to be well controlled, you could cut back to every other day for 2 weeks then every 3rd day for 2 weeks and then stop come spring time and we can re-evaluate your needs if going well at our June visit.  3. Follow up with dietician in 2-3 months.  4. Consider change to Equate compete multivitamin with Iron. Twice daily dosing works well for most. B12, D3 can continue at current doses and calcium can reduce to once daily at this point.  Recheck labs at our next visit in June.  5. Blood pressure doing well on just lisinopril now and follow up with your regular clinic annually.   6. Continue regular cpap use.      [facility]  Nutritional Guidelines  Gastric Sleeve 6 Months Post Op    General Guidelines and Helpful Hints:    Eat 3 meals per day + protein supplement(s). No snacks between meals.  o Do not skip meals.  This can cause overeating at the next meal and will prevent adequate protein and nutritional intake.    Aim for 60-80 grams of protein per day.   o Always eat your protein first. This assists with optimal nutrition and helps you stay full longer.  o To achieve daily protein goals, it is recommended to drink approved protein supplement between meals.    Follow appropriate portion size: Use measuring cups to be accurate.    Months Post Op Portion Size per Meal   6 months 1/2 cup   7-8 months 1/2 - 2/3 cup   8 -9 months 2/3 - 3/4 cup   10-12 months 3/4 - 1 cup   12 months and beyond 1 cup maximum       Continue to use saucer/salad plates, infant/toddler silverware to keep portion sizes small and take small bites.    Eat [id number] to make each meal last 20-30 minutes. Always stop eating  when satisfied.    Continue to use caution with foods containing skins, peels or membranes. Chew well!    Aim for 64 oz. of calorie-free fluids daily.  o Continue to avoid caffeine, carbonation and alcohol.  o Remember to avoid drinking during meals, 15-30 minutes before and 30 minutes after.    Aim for 30-60 minutes of physical activity most days of the week.    If having trouble tolerating meat, try using a crock-pot, tinfoil tent, steamer or other moist cooking method to create tender meats. Add broth or low-fat gravy to help meat stay moist.     Avoid high sugar and high fat foods to prevent high calorie intake. This will reduce your rate of weight loss.  o Check nutrition labels for less than 10 grams of sugar and less than 10 grams of fat per serving.    Continue Taking Vitamins/Minerals:  o 0034-4315 mcg of Sublingual B-12 daily  o 1 Multivitamin with Iron twice daily (chewable or swallow tabs)  o 500-600 mg Calcium Citrate twice daily (chewable or swallow tabs)  o 5000 IU Vitamin D3 daily    Sample Grocery List    Protein:    Fat free Greek or light yogurt (less than 10 grams sugar)    Fat free or low-fat cottage cheese    String cheese or reduced fat cheese slices    Tuna, salmon, crab, egg, or chicken salad made with light or fat free mayonnaise    Egg or Egg Substitute    Lean/extra lean turkey, beef, bison, venison (ground, sirloin, round, flank)    Pork loin or tenderloin (grilled, baked, broiled)    Fish such as salmon, tuna, trout, tilapia, etc. (grilled, baked, broiled)    Tender cuts of lean (skinless) turkey or chicken    Lean deli meats: turkey, lean ham, chicken, lean roast beef    Beans such as kidney, garbanzo, black, bustos, or low-fat/fat free refried beans    Peanut butter (natural preferred). Limit to 1 Tbsp. per day.    Low-fat meatloaf (made with lean ground beef or turkey)    Sloppy Joes made with low-sugar ketchup and lean ground beef or turkey    Soy or vegetable protein (i.e. vegan  crumbles, soy/veggie burger, tofu)    Hummus    Vegetables:    Fresh: cooked or raw (as tolerated)    Frozen vegetables    Canned vegetables (low sodium or no salt added, rinse before cooking/eating)    (Ok to have skins/peels/membranes/seeds - just chew well)    Fruits:    Fresh fruit    Frozen fruit (no sugar added)    Canned fruit (packed in its own juice, NOT syrup)    (Ok to have skins/peels/membranes/seeds - just chew well)    Starch:    Unsweetened whole-grain hot cereal (or high fiber cold cereal, dry)    Toasted whole wheat bread or [place] Thins    Whole grain crackers    Baked/boiled/mashed potato/sweet potato    Cooked whole grain pasta, brown rice, or other cooked whole grains    Starchy vegetables: corn, peas, winter squash    Protein Supplement:     Ready to drink protein shake with:  o 15-30 grams protein per serving  o Less than 10 grams total carbohydrate per serving     Protein powder mixed with:  o  Skim or 1% milk  o Low fat or fat free Lactaid milk, plain or no sugar added soymilk  o Water     Fats: (use in moderation)    1 teaspoon of soft tub margarine    1 teaspoon olive oil, canola oil, or peanut oil    1 tablespoon of low-fat [place] or salad dressing     Sample Menu for 6 months after Gastric Sleeve    You do NOT need to eat/drink the full portion sizes listed below  Always stop when you are satisfied  Breakfast 6 Tbsp. 1% cottage cheese   2 Tbsp. peaches    Lunch 2 oz. lean hamburger or veggie burger  1-2 tsp. salsa   Supplement Approved Protein Shake   (Have between meals throughout the day)   Dinner 6 Tbsp. chicken breast  1-2 Tbsp. green beans     Breakfast 2 Eggs or   cup scrambled egg substitute product   Lunch 7 Tbsp. low-fat Sloppy Ramos mixture   2 high fiber crackers   Supplement Approved Protein Shake   (Have between meals throughout the day)   Dinner 6 Tbsp. grilled, broiled, or baked lemon pepper salmon  2 Tbsp. asparagus     Breakfast 6 Tbsp. light yogurt with 2 Tbsp. Grape Nuts  or high fiber cereal    Lunch   cup of chili made with extra lean ground beef and kidney beans   Dinner 5 Tbsp. pork loin made in a crock pot  2 Tbsp. cooked broccoli  1 Tbsp. baked potato with 2 sprays of spray margarine    Supplement Approved Protein Shake   (Have between meals throughout the day)     Breakfast 6 Tbsp. lean ham  2 Tbsp. seedless melon   Lunch 7 Tbsp. diced turkey with 1 teaspoon low-fat gravy  1 Tbsp. green beans   Dinner 6 Tbsp. extra lean ground beef mixed with low sugar spaghetti sauce  2 Tbsp. whole wheat pasta   Supplement Approved Protein Shake   (Have between meals throughout the day)     Breakfast 2 oz turkey or soy based sausage omkar    Lunch 6 Tbsp. low-fat cottage cheese  2 Tbsp. pineapple   Supplement Approved Protein Shake   (Have between meals throughout the day)   Dinner 7 Tbsp. beef tenderloin  1 Tbsp. asparagus     Breakfast 1/2 of a whole wheat English Muffin (toasted)  1 Tbsp. creamy natural peanut butter   Lunch   cup of black bean soup with 1 Tbsp. low fat shredded cheese   Dinner 7 Tbsp. low-fat turkey meat loaf   1 Tbsp. cooked carrots   Supplement Approved Protein Shake   (Have between meals throughout the day)     Breakfast 6 Tbsp. scrambled egg or scrambled egg substitute  1 Tbsp. finely chopped bell pepper  1 Tbsp. low fat shredded cheese   Lunch 7 Tbsp. lean diced ham  1 Tbsp. mandarin oranges   Supplement Approved Protein Shake   (Have between meals throughout the day)   Dinner 6 Tbsp. flaked fish   2 Tbsp. mashed sweet potato       LEAN PROTEIN SOURCES  Getting 20-30 grams of protein, 3 meals daily, is appropriate for most people, some need more but more than about 40 grams per meal is not useful.  General rule is drinking one ounce of water per gram of protein eaten over the course of the day:  70 grams of protein each day, drink 70 oz of water.  Protein Source Portion Calories Grams of Protein                           Nonfat, plain Greek yogurt    (10 grams sugar  or less) 3/4 cup (6 oz)  12-17   Light Yogurt (10 grams sugar or less) 3/4 cup (6 oz)  6-8   Protein Shake 1 shake 110-180 15-30   Skim/1% Milk or lactose-free milk 1 cup ( 8 oz)  8   Plain or light, flavored soymilk 1 cup  7-8   Plain or light, hemp milk 1 cup 110 6   Fat Free or 1% Cottage Cheese 1/2 cup 90 15   Part skim ricotta cheese 1/2 cup 100 14   Part skim or reduced fat cheese slices 1 ounce 65-80 8     Mozzarella String Cheese 1 80 8   Canned tuna, chicken, crab or salmon  (canned in water)  1/2 cup 100 15-20   White fish (broiled, grilled, baked) 3 ounces 100 21   [place]/Tuna (broiled, grilled, baked) 3 ounces 150-180 21   Shrimp, Scallops, Lobster, Crab 3 ounces 100 21   Pork loin, Pork Tenderloin 3 ounces 150 21   Boneless, skinless chicken /turkey breast                          (broiled, grilled, baked) 3 ounces 120 21   [place], [place], [place], and Venison 3 ounces 120 21   Lean cuts of red meat and pork (sirloin,   round, tenderloin, flank, ground 93%-96%) 3 ounces 170 21   Lean or Extra Lean Ground Turkey 1/2 cup 150 20   90-95% Lean [place] Burger 1 omkar 140-180 21   Low-fat casserole with lean meat 3/4 cup 200 17   Luncheon Meats                                                        (turkey, lean ham, roast beef, chicken) 3 ounces 100 21   Egg (boiled, poached, scrambled) 1 Egg 60 7   Egg Substitute 1/2 cup 70 10   Nuts (limit to 1 serving per day)  3 Tbsp. 150 7   Nut [place] (peanut, almond)  Limit to 1 serving or less daily 1 Tbsp. 90 4   Soy Burger (varies) 1  15   Garbanzo, Black, [place] Beans 1/2 cup 110 7   Refried Beans 1/2 cup 100 7   Kidney and [place]ima beans 1/2 cup 110 7   Tempeh 3 oz 175 18   Vegan crumbles 1/2 cup 100 14   Tofu 1/2 cup 110 14   Chili (beans and extra lean beef or turkey) 1 cup 200 23   Lentil Stew/Soup 1 cup 150 12   Black Bean Soup 1 cup 175 12         Exercise Guidance    Nearly everything that bothers us gets better when the proper amount  of exercise can be done in the proper amounts.  Getting to that level safely and without injury is the key.  When it comes to weight loss, exercise is especially important in maintaining the weight loss.  Unfortunately, one of the harsh realities is that substantial weight loss slows our metabolism, often anywhere from 5-20%.    Our brain always remembers our heaviest weight and we can return to that if we're not mindful and moving regularly.  Our biology doesn't understand the concept of having too much energy, only not having enough.  As such, when we lose weight, it's thought that the brain interprets this as we're ill or in a famine and dials back our metabolism to limit further weight loss.  This is why exercise is so important in keeping the weight off and is the main reason people have some weight regain from their low weight point after weight loss.  We have to make up that 10-20% of calories not being burned.Since we can restrict our intake for only so long, exercise becomes very important in our long term healthy weigh maintenance to balance out the occasional indiscretion with our diet.    Generally, for every 5% body weight reduction in a weight loss season, a person needs to add  kilocalories of exercise in their daily routine to keep that weight off for the long term.  This is why it's vital to be starting your fitness regimen during weight loss season, so that routine is well established as you move into your maintenance period.    Additionally, all sorts of good enzymes and genes turn on with exercise and our stress, sleep, mood and bodies feel better when we can get to the point of making ourselves a little sweaty and short of breath 35-50 minutes most days of the week. But we have to start with what we can do first and give ourselves permission to work our way up to this goal.    Who isn't ready for exercise? Well, if you get severe dizziness/palpitations, chest pain or short of breath/faint  "with even minimal activity like walking across a room or you're having to pause while going up a flight of stairs, then getting your heart and/or lungs fully evaluated prior to starting an exercise regimen is recommended. Everyone else can probably start a program, but everyone may start at a different point:  Some can set a 5-10 minute walking goal and others will be able to ride their bike for an hour.      Start with where you're at and look to add 10% more each week until you're at that 150 minutes or more a week (or 75 minutes/week or more of vigorous exercise). Moderate exercise can be estimated as the pace you can carry on a conversation and vigorous is the pace at which you can get 3-5 words out before having to take a breath.  If you're using heart rate monitoring, Moderate is about 60% of your maximum heart rate and vigorous about 75%. (Max heart rate estimated as 220 beats minus your age:  Example: 220-age of 46 =176 Beats per minute (BPM) maximum. 0.6X 176= 105 BPM (moderate), 132 BMP(vigorous)).    If you like to count steps, the 10,000 steps per day does correlate well with weight maintenance but try to make at least 20-25% of those steps at a brisk pace (like you are about to miss your bus).    Finally, if you are pressed for time, it's important to know that some exercise is better than none.  High Intensity Interval training (HIIT) is a good way to get as much out of a short period of working out. If you can't walk, use the stairs, bike or swim; you could use a punching/arm workout regimen for your activity.  The idea with HIIT is to have a 3-6 minute warm up period of low intensity and the 3-6 \"intervals\" where you push the intensity up and then recover and start the next interval. One study showed that 3 intervals of 20 seconds at \"Maximum Effort\" while either biking on a stationary bike or going up stairs and then having 100 seconds recovery time before the next Maximum Effort was equally as " beneficial on cardiovascular fitness development as doing 30 minutes of moderately paced walking 3 days weekly over a 6 week period of time.  So intensity matters. You just need to be able to safely do your desired exercise without injury. There are many great HIIT exercises/routines out there. IF you're not doing much exercise currently, I recommend giving your self 2-3 weeks of moderate exercise, 3 days weekly minimum to get your bones/tendons/muscles used to exercise before going for High Intensity workouts.    If you like to use Apps on the phone, the couch to 5k klaus and 7 minute workout apps are nice places to start if you are reasonably healthy.  There are hundreds of other options out there.  Consider viewing [employer] if gentler exercise/movement is desired. Videos on Alfredo Chi and chair yoga for seniors exist and are free. Check them out and let's get that 3-4 days a week routine going.    Let's move!  Irvin Branch MD.

## 2021-12-17 NOTE — LETTER
12/17/2021         RE: Nick Coley  [address] [place] [address]        Dear Colleague,    Thank you for referring your patient, Nick Coley, to the [facility] [place]. Please see a copy of my visit note below.    Video visit follow up:  8:06 AM  Bariatric Follow Up Visit with a History of Previous Bariatric Surgery     Date of visit: 12/17/2021  Physician: Irvin Branch MD, MD  Primary Care Provider:  [facility]  Nick Coley   61 year old  male    Date of Surgery: 6/15/21  Initial Weight: 351 lbs  Initial BMI: 54.3  Today's Weight:   Wt Readings from Last 1 Encounters:   11/09/21 127 kg (280 lb)   BP at PCP office on 11/09/21 was 128/70.  Weight history:   Wt Readings from Last 4 Encounters:   11/09/21 127 kg (280 lb)   10/04/21 127.5 kg (281 lb)   09/14/21 129.3 kg (285 lb)   06/15/21 149.6 kg (329 lb 11.2 oz)    preop weight 329 lbs.   There is no height or weight on file to calculate BMI.      Assessment and Plan     Assessment: Nick is a 61 year old year old male who is 6 months s/p  Sleeve Gastrectomy with Dr. Olmos.  Weight loss has been slower than average and on review of habits/meals this may stem from some on the go eating/snacking but overall is having good improvement in BP control with medication reduction. Will continue cpap use and once his holiday job ends in January, will focus on getting back to the gym/personal fitness goals to help restore some of his lost muscle mass.  Vitamin use is good. Omeprazole can continue and will finish off the Ursodiol and be done with it..    Nick Coley feels as if he has achieved the goals he hoped to accomplish through bariatric surgery and weight loss.    No diagnosis found.      Current Outpatient Medications:      calcium citrate-vitamin D (CITRACAL) 315-200 MG-UNIT TABS per tablet, Take 2 tablets by mouth daily, Disp: , Rfl:      Cholecalciferol 125 MCG (5000 UT) TABS, , Disp: , Rfl:       citalopram (CELEXA) 20 MG tablet, Take 1 tablet (20 mg) by mouth daily, Disp: 90 tablet, Rfl: 3     cyanocobalamin (CYANOCOBALAMIN) 1000 MCG SUBL sublingual tablet, Place 1,000 mcg under the tongue daily, Disp: , Rfl:      lisinopril (ZESTRIL) 10 MG tablet, Take 1 tablet (10 mg) by mouth daily, Disp: 90 tablet, Rfl: 3     omeprazole (PRILOSEC) 20 MG DR capsule, Take 1 capsule (20 mg) by mouth daily, Disp: 90 capsule, Rfl: 0     rosuvastatin (CRESTOR) 10 MG tablet, Take 1 tablet (10 mg) by mouth daily, Disp: 90 tablet, Rfl: 3     UNABLE TO FIND, MEDICATION NAME: Men One A Day Multivitamin, Disp: , Rfl:      ursodiol (ACTIGALL) 300 MG capsule, Take 1 capsule by mouth 2 times daily, Disp: , Rfl:     Plan:  1. Continue focus on 3 meals daily, about every 4-5 hours and supplement w/ protein drinks as needed to hit that 80g/day goal.  Hydrate between meals but a bit slower to prevent some of the overwhelming of your pouch that you've experienced.  2. Continue omeprazole this winter and if heart burn continues to be well controlled, you could cut back to every other day for 2 weeks then every 3rd day for 2 weeks and then stop come spring time and we can re-evaluate your needs if going well at our June visit.  3. Follow up with dietician in 2-3 months.  4. Consider change to Equate compete multivitamin with Iron. Twice daily dosing works well for most. B12, D3 can continue at current doses and calcium can reduce to once daily at this point.  Recheck labs at our next visit in June.  5. Blood pressure doing well on just lisinopril now and follow up with your regular clinic annually.   6. Continue regular cpap use.    No follow-ups on file.    Bariatric Surgery Review     Interim History/LifeChanges: had some initial dizziness early on, adjusted BP meds and improved. GERD early on but better now.     Patient Concerns: some trouble w/ water intake last couple months. Notices that smaller cups are needed.  Appetite (1-10):  "good. Stomach feels wagner at times and as above can affect hydration.   GERD: rarely.    Reviewed whether any need/indication for screening EGD today and we will deferred.  Typically, a screening EGD is recommend post op year 2-3 if no symptoms to assess health of esophagus/bariatric surgery and sooner if difficult to control GERD or persistent pain/dysphagia sx despite behavior modification.    Medication changes: off hydrochlorothiazide and on 10mg lisinopril now.     Vitamin Intake:   B-12   SL 1000mcg uses daily.   MVI  flintstones complete w/ iron. Once daily.   Vitamin D  yes D3, 125mcg.   Calcium   citrate. Once daily.     Other  occ protein shake.              LABS: \"Reviewed  Sometimes will overwhelm capacity of stomach w/ liquids, likely speed of drinking related. Will avoid chugging.  Nausea no  Vomiting no  Constipation no  Diarrhea no longer. Resolved.  Rashes no  Hair Loss no.   Reactive Hypoglycemia no  Light Headedness no   Moods good.      Most recent labs:  Lab Results   Component Value Date    WBC 6.3 11/09/2021    HGB 14.7 11/09/2021    HCT 45.1 11/09/2021    MCV 89 11/09/2021     11/09/2021     Lab Results   Component Value Date    CHOL 186 11/09/2021     Lab Results   Component Value Date    HDL 43 11/09/2021     No components found for: LDLCALC  Lab Results   Component Value Date    TRIG 128 11/09/2021     No components found for: CHOLHDL  Lab Results   Component Value Date    ALT 16 11/09/2021    AST 17 11/09/2021    ALKPHOS 65 11/09/2021     No results found for: HGBA1C  No components found for: BGCHZZOP94  No components found for: FXPBABYK16AG  No components found for: FERRITIN  No components found for: PTH  No components found for: 46955  No components found for: 7597  Lab Results   Component Value Date    TSH 0.98 09/25/2020     No results found for: TESTOSTERONE    Habits:  Tobacco/Nicotine/THC exposure? no   NSAID use? Not using baby ASA.   Alcohol use? Not yet.   Caffeine " "Habits? 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 Has home treadmill. [employer].  3 meals/day? Yes. Very busy w/ 2 jobs during winter.  Protein 60-80g/day? Yes. Getting 2 daily.  Water Separate from meals? yes  Calorie Containing Beverages: no. Few PB crackers for a snack. Almonds/trailmix.   Restaurant eating/wk: n/a  Sleep Habits:  good  CPAP Use? yes  Contraception: na  DEXA:no.  Discussed annual screening to start at age 45 and continue to age 55 if scoring \"low risk\". DEXA scan recommended at age 55 regardless as long as at least 2 years have transpired from their bariatric surgery.    Social History     Social History     Socioeconomic History     Marital status:      Spouse name: Not on file     Number of children: Not on file     Years of education: Not on file     Highest education level: Not on file   Occupational History     Not on file   Tobacco Use     Smoking status: Never Smoker     Smokeless tobacco: Never Used     Tobacco comment: cigar once or twice a year   Substance and Sexual Activity     Alcohol use: Not Currently     Comment: Alcoholic Drinks/day: just during softball season or vacation-0-2     Drug use: No     Sexual activity: Yes     Partners: Female   Other Topics Concern     Not on file   Social History Narrative    , 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      Social Determinants of Health     Financial Resource Strain: Low Risk      Difficulty of Paying Living Expenses: Not hard at all   Food Insecurity: No Food Insecurity     Worried About Running Out of Food in the Last Year: Never true     Ran Out of Food in the Last Year: Never true   Transportation Needs: No Transportation Needs     Lack of Transportation (Medical): No     Lack of Transportation (Non-Medical): No   Physical Activity: Sufficiently Active     Days of Exercise per Week: 5 days     Minutes of Exercise per Session: 60 min   Stress: No " Stress Concern Present     Feeling of Stress : Not at all   Social Connections: Socially Integrated     Frequency of Communication with Friends and Family: More than three times a week     Frequency of Social Gatherings with Friends and Family: Once a week     Attends Hoahaoism Services: 1 to 4 times per year     Active Member of Clubs or Organizations: Yes     Attends Club or Organization Meetings: Not on file     Marital Status:    Intimate Partner Violence: Not on file   Housing Stability: Low Risk      Unable to Pay for Housing in the Last Year: No     Number of Places Lived in the Last Year: 1     Unstable Housing in the Last Year: No       Past Medical History     Past Medical History:   Diagnosis Date     Abnormal LFTs (liver function tests) 12/18/2015    Negative hepatitis C antibody and ferritin of 330 6 December 2015, steatohepatitis suspected     Acute blood loss anemia 03/18/2021     WHIT (acute kidney injury) (H) 03/18/2021     Benign essential hypertension      Cellulitis of left lower extremity 05/29/2020     Cellulitis, face 2014     Chronic kidney disease      Chronic tension-type headache, not intractable 06/10/2019     Depression with anxiety      Essential hypertension 12/18/2015     Fatty liver     ultrasound 2001 demonstrated.     Hematoma of leg, left, subsequent encounter 07/23/2018     History of colon polyps     Colonoscopy November 2018 with multiple polyps removed, repeat in 3 years     History of gastric bypass     Normal sleeve gastrectomy June 2021     Hyperlipidemia      Irritable bowel syndrome      Kidney stones     x 4     LBP (low back pain)      Microscopic hematuria 07/23/2018     Moderate major depression (H) 06/10/2019     Morbid obesity (H) 12/18/2015    Normal sleeve gastrectomy June 2021     Nephrolithiasis 12/2020    Right     Nephrolithiasis     2013, 2009, 2001.  Right percutaneous nephrolithotomy March 2021.  Stone analysis 90% uric acid and 10% calcium oxalate      Numbness of left anterior thigh 11/9/2021    Numbness and burning left anterior thigh above his knee     ANAM (obstructive sleep apnea) 01/2021    Severe     Sleep apnea     uses cpap     Suspected sleep apnea 11/16/2018     Tear of meniscus of right knee 12/18/2015    Associated with right tibial plateau fracture 2013, arthroscopic knee surgery December 2015     Vitamin D deficiency     Vitamin D 10.0 November 2018     Wrist fracture      Problem List     Patient Active Problem List   Diagnosis     Snoring     Shifting sleep-work schedule     ANAM (obstructive sleep apnea)     Abnormal liver function     Calculus of kidney     Chronic tension-type headache, not intractable     Essential hypertension     History of colon polyps     Hyperlipidemia     Irritable bowel syndrome     Low back pain     Microscopic hematuria     Vitamin D deficiency     Nephrolithiasis     Moderate major depression (H)     Fatty liver     Morbid obesity (H)     History of gastric bypass     Numbness of left anterior thigh     Medications     [unfilled]  Surgical History     Past Surgical History  He has a past surgical history that includes compound fracture (Right); carpal tunnel release rt/lt (Right, 2001); Release carpal tunnel (Right, 2001); Extracorporeal Shock Wave Lithotripsy (2013); KNEE SCOPE,MED-LAT MENISECTOMY (Right, 12/22/2015); [place] Tooth Extraction (2013); Kidney Stone Surgery (Right); other surgical history; and Pr Lap, Lisa Restrict Proc, Longitudinal Gastrectomy (N/A, 6/15/2021).    Objective-Exam     Constitutional:  There were no vitals taken for this visit.  [unfilled]   General:  Pleasant and in no acute distress   Eyes:  EOMI  ENT:  Airway patent  Normal voice. No cough.      Counseling     We reviewed the important post op bariatric recommendations:  -eating 3 meals daily  -eating protein first, getting >60gm protein daily  -eating slowly, chewing food well  -avoiding/limiting calorie containing  beverages  -drinking water 15-30 minutes before or after meals  -limiting restaurant or cafeteria eating to twice a week or less    We discussed the importance of restorative sleep and stress management in maintaining a healthy weight.  We discussed the National Weight Control Registry healthy weight maintenance strategies and ways to optimize metabolism.  We discussed the importance of physical activity including cardiovascular and strength training in maintaining a healthier weight.    We discussed the importance of life-long vitamin supplementation and life-long  follow-up.    Nick was reminded that, to avoid marginal ulcers he should avoid tobacco at all, alcohol in excess, caffeine in excess, and NSAIDS (unless indicated for cardioprotection or othewise and opposed by a PPI).    Irvin Branch MD    [facility].  12/17/2021  8:06 AM  [phone] (clinic phone)  [phone] (fax)    No images are attached to the encounter.  Medical Decision Making:    Converted to phone visit due to no audio on video visit.    30 minutes spent on the date of the encounter doing chart review, history and exam, documentation and further activities per the note    Nick Coley is 61 year old  male who presents for a billable video visit today.    How would you like to obtain your AVS? MyChart  If dropped from the video visit, the video invitation should be resent by: Text to cell phone: [phone]  Will anyone else be joining your video visit? No        Distant Location (provider location):  [facility] [place]     Platform used for Video Visit: [facility]      Again, thank you for allowing me to participate in the care of your patient.        Sincerely,        Irvin Branch MD

## 2021-12-17 NOTE — PROGRESS NOTES
Nick Coley is 61 year old  male who presents for a billable video visit today.    How would you like to obtain your AVS? MyChart  If dropped from the video visit, the video invitation should be resent by: Text to cell phone: [phone]  Will anyone else be joining your video visit? No        Distant Location (provider location):  [facility]     Platform used for Video Visit: Melodie

## 2022-01-12 VITALS — WEIGHT: 315 LBS | BODY MASS INDEX: 49.44 KG/M2 | HEIGHT: 67 IN

## 2022-01-18 VITALS
SYSTOLIC BLOOD PRESSURE: 182 MMHG | BODY MASS INDEX: 47.74 KG/M2 | WEIGHT: 315 LBS | DIASTOLIC BLOOD PRESSURE: 90 MMHG | OXYGEN SATURATION: 95 % | HEIGHT: 68 IN | HEART RATE: 78 BPM

## 2022-01-18 VITALS
SYSTOLIC BLOOD PRESSURE: 138 MMHG | WEIGHT: 315 LBS | BODY MASS INDEX: 47.74 KG/M2 | HEIGHT: 68 IN | DIASTOLIC BLOOD PRESSURE: 76 MMHG

## 2022-01-18 VITALS
HEIGHT: 68 IN | TEMPERATURE: 97.8 F | OXYGEN SATURATION: 97 % | DIASTOLIC BLOOD PRESSURE: 70 MMHG | WEIGHT: 315 LBS | BODY MASS INDEX: 47.74 KG/M2 | HEART RATE: 66 BPM | SYSTOLIC BLOOD PRESSURE: 124 MMHG

## 2022-01-18 VITALS
TEMPERATURE: 97 F | SYSTOLIC BLOOD PRESSURE: 136 MMHG | OXYGEN SATURATION: 94 % | HEART RATE: 73 BPM | WEIGHT: 315 LBS | BODY MASS INDEX: 47.74 KG/M2 | HEIGHT: 68 IN | DIASTOLIC BLOOD PRESSURE: 74 MMHG

## 2022-01-18 VITALS
BODY MASS INDEX: 47.74 KG/M2 | SYSTOLIC BLOOD PRESSURE: 122 MMHG | DIASTOLIC BLOOD PRESSURE: 64 MMHG | SYSTOLIC BLOOD PRESSURE: 128 MMHG | WEIGHT: 315 LBS | OXYGEN SATURATION: 94 % | HEART RATE: 77 BPM | BODY MASS INDEX: 52.62 KG/M2 | HEART RATE: 68 BPM | DIASTOLIC BLOOD PRESSURE: 70 MMHG | OXYGEN SATURATION: 94 % | WEIGHT: 315 LBS | HEIGHT: 68 IN

## 2022-01-18 VITALS — HEIGHT: 68 IN | WEIGHT: 315 LBS | BODY MASS INDEX: 47.74 KG/M2

## 2022-01-18 ASSESSMENT — PATIENT HEALTH QUESTIONNAIRE - PHQ9
SUM OF ALL RESPONSES TO PHQ QUESTIONS 1-9: 3
SUM OF ALL RESPONSES TO PHQ QUESTIONS 1-9: 0

## 2022-01-21 ENCOUNTER — NURSE TRIAGE (OUTPATIENT)
Dept: NURSING | Facility: CLINIC | Age: 62
End: 2022-01-21
Payer: COMMERCIAL

## 2022-01-21 NOTE — TELEPHONE ENCOUNTER
"Coronavirus (COVID-19) Notification    Caller Name (Patient, parent, daughter/son, grandparent, etc)  Grace Wife    Reason for call  Notify of Positive Coronavirus (COVID-19) lab results, assess symptoms,  review  [facility] recommendations    Lab Result    Lab test:  2019-nCoV rRt-PCR or SARS-CoV-2 PCR    Oropharyngeal AND/OR nasopharyngeal swabs is POSITIVE for 2019-nCoV RNA/SARS-COV-2 PCR (COVID-19 virus)    RN Recommendations/Instructions per [facility] Coronavirus COVID-19 recommendations    Brief introduction script  Introduce self then review script:  \"I am calling on behalf of [facility].  We were notified that your Coronavirus test (COVID-19) for was POSITIVE for the virus.  I have some information to relay to you but first I wanted to mention that the [place] Dept of Health will be contacting you shortly [it's possible MD already called Patient] to talk to you more about how you are feeling and other people you have had contact with who might now also have the virus.  Also,  [facility] is Partnering with the [facility] for Covid-19 research, you may be contacted directly by research staff.\"    Assessment (Inquire about Patient's current symptoms)   Assessment   Current Symptoms at time of phone call: (if no symptoms, document No symptoms] Sinus pressure   Symptoms onset (if applicable) 2 days ago     If at time of call, Patients symptoms hare worsened, the Patient should contact 911 or have someone drive them to Emergency Dept promptly:      If Patient calling 911, inform 911 personal that you have tested positive for the Coronavirus (COVID-19).  Place mask on and await 911 to arrive.    If Emergency Dept, If possible, please have another adult drive you to the Emergency Dept but you need to wear mask when in contact with other people.      Monoclonal Antibody Administration    Is the patient symptomatic at the time of result notification? Yes. You may be eligible to receive a " new treatment with a monoclonal antibody for preventing hospitalization in patients at high risk for complications from COVID-19.   This medication is still experimental and available on a limited basis; it is given through an IV and must be given at an infusion center. Please note that not all people who are eligible will receive the medication since it is in limited supply.   Are you interested in being considered for this medication?  Yes.   Is the patient symptomatic?  Yes. Is the patient 18 years of age or older? Yes.  Is the patient newly (within the last week) on supplemental oxygen or requiring more oxygen than usual?  No. Patient criteria for selection: Is the patients weight equal to or greater than 40 kg (88 lbs)? Yes.  Is the patient's age 65 years or older?  No. Is the patient 55 years or older and have one or more of the following conditions: Hypertension, CHF, COPD/Chronic pulmonary disease?  No. Is the patient 18 years or older and has one or more of the following conditions: Chronic Kidney Disease, Diabetes Mellitus, BMI equal to or greater than 35, Immunosuppressive Disease or taking Immunosuppressive medications?  Yes.  Patient qualifies, refer patient to [facility] ([email or web address])  Does the patient fit the criteria: Yes: Patient referred to [facility] website, patient or family/friend will complete application ([email or web address]/[email or web address].html)    Review information with Patient    Your result was positive. This means you have COVID-19 (coronavirus).  We have sent you a letter that reviews the information that I'll be reviewing with you now.    How can I protect others?    If you have symptoms: stay home and away from others (self-isolate) until:    You've had no fever--and no medicine that reduces fever--for 1 full day (24 hours). And       Your other symptoms have gotten better. For example, your cough or breathing has improved.  And     At least 10 days have passed since your symptoms started. (If you've been told by a doctor that you have a weak immune system, wait 20 days.)     If you don't have symptoms: Stay home and away from others (self-isolate) until at least 10 days have passed since your first positive COVID-19 test. (Date test collected)    During this time:    Stay in your own room, including for meals. Use your own bathroom if you can.    Stay away from others in your home. No hugging, kissing or shaking hands. No visitors.     Don't go to work, school or anywhere else.     Clean  high touch  surfaces often (doorknobs, counters, handles, etc.). Use a household cleaning spray or wipes. You'll find a full list on the EPA website at www.epa.gov/pesticide-registration/list-n-disinfectants-use-against-sars-cov-2.     Cover your mouth and nose with a mask, tissue or other face covering to avoid spreading germs.    Wash your hands and face often with soap and water.    Make a list of people you have been in close contact with recently, even if either of you wore a face covering.   - Start your list from 2 days before you became ill or had a positive test.  - Include anyone that was within 6 feet of you for a cumulative total of 15 minutes or more in 24 hours. (Example: if you sat next to Tunde for 5 minutes in the morning and 10 minutes in the afternoon, then you were in close contact for 15 minutes total that day. Tunde would be added to your list.)    A public health worker will call or text you. It is important that you answer. They will ask you questions about possible exposures to COVID-19, such as people you have been in direct contact with and places you have visited.    Tell the people on your list that you have COVID-19; they should stay away from others for 14 days starting from the last time they were in contact with you (unless you are told something different from a public health worker).     Caregivers in these groups are  at risk for severe illness due to COVID-19:  o People 65 years and older  o People who live in a nursing home or long-term care facility  o People with chronic disease (lung, heart, cancer, diabetes, kidney, liver, immunologic)  o People who have a weakened immune system, including those who:  - Are in cancer treatment  - Take medicine that weakens the immune system, such as corticosteroids  - Had a bone marrow or organ transplant  - Have an immune deficiency  - Have poorly controlled HIV or AIDS  - Are obese (body mass index of 40 or higher)  - Smoke regularly    Caregivers should wear gloves while washing dishes, handling laundry and cleaning bedrooms and bathrooms.    Wash and dry laundry with special caution. Don't shake dirty laundry, and use the warmest water setting you can.    If you have a weakened immune system, ask your doctor about other actions you should take.    For more tips, go to www.cdc.gov/coronavirus/2019-ncov/downloads/10Things.pdf.    You should not go back to work until you meet the guidelines above for ending your home isolation. You don't need to be retested for COVID-19 before going back to work--studies show that you won't spread the virus if it's been at least 10 days since your symptoms started (or 20 days, if you have a weak immune system).    Employers: This document serves as formal notice of your employee's medical guidelines for going back to work. They must meet the above guidelines before going back to work in person.    How can I take care of myself?    1. Get lots of rest. Drink extra fluids (unless a doctor has told you not to).    2. Take Tylenol (acetaminophen) for fever or pain. If you have liver or kidney problems, ask your family doctor if it's okay to take Tylenol.     Take either:     650 mg (two 325 mg pills) every 4 to 6 hours, or     1,000 mg (two 500 mg pills) every 8 hours as needed.     Note: Don't take more than 3,000 mg in one day. Acetaminophen is found in many  medicines (both prescribed and over-the-counter medicines). Read all labels to be sure you don't take too much.    For children, check the Tylenol bottle for the right dose (based on their age or weight).    3. If you have other health problems (like cancer, heart failure, an organ transplant or severe kidney disease): Call your specialty clinic if you don't feel better in the next 2 days.    4. Know when to call 911: Emergency warning signs include:    Trouble breathing or shortness of breath    Pain or pressure in the chest that doesn't go away    Feeling confused like you haven't felt before, or not being able to wake up    Bluish-colored lips or face    5. Sign up for [employer] We know it's scary to hear that you have COVID-19. We want to track your symptoms to make sure you're okay over the next 2 weeks. Please look for an email from [employer]this is a free, online program that we'll use to keep in touch. To sign up, follow the link in the email. Learn more at [email or web address].    Where can I get more information?    [facility]: [email or web address]    Coronavirus Basics: www.[email or web address]/diseases/coronavirus/basics.html    What to Do If You're Sick: www.cdc.gov/coronavirus/2019-ncov/about/steps-when-sick.html    Ending Home Isolation: www.cdc.gov/coronavirus/2019-ncov/hcp/disposition-in-home-patients.html     Caring for Someone with COVID-19: www.cdc.gov/coronavirus/2019-ncov/if-you-are-sick/care-for-someone.html     [facility] clinical trials (COVID-19 research studies): [email or web address]-[email or web address]-trials     A Positive COVID-19 letter will be sent via [employer] or the mail. (Exception, no letters sent to Presurgerical/Preprocedure Patients)    Daniela Ngo RN    Reason for Disposition    [1] COVID-19 diagnosed by positive lab test AND [2] mild symptoms (e.g., cough, fever, others) AND [3] no complications or SOB    Sinus congestion as  part of a cold, present < 10 days    Additional Information    Negative: SEVERE difficulty breathing (e.g., struggling for each breath, speaks in single words)    Negative: Difficult to awaken or acting confused (e.g., disoriented, slurred speech)    Negative: Bluish (or gray) lips or face now    Negative: Shock suspected (e.g., cold/pale/clammy skin, too weak to stand, low BP, rapid pulse)    Negative: Sounds like a life-threatening emergency to the triager    Negative: [1] COVID-19 exposure AND [2] no symptoms    Negative: COVID-19 vaccine reaction suspected (e.g., fever, headache, muscle aches) occurring 1 to 3 days after getting vaccine    Negative: COVID-19 vaccine, questions about    Negative: [1] Lives with someone known to have influenza (flu test positive) AND [2] flu-like symptoms (e.g., cough, runny nose, sore throat, SOB; with or without fever)    Negative: [1] Adult with possible COVID-19 symptoms AND [2] triager concerned about severity of symptoms or other causes    Negative: COVID-19 and breastfeeding, questions about    Negative: SEVERE or constant chest pain or pressure (Exception: mild central chest pain, present only when coughing)    Negative: MODERATE difficulty breathing (e.g., speaks in phrases, SOB even at rest, pulse 100-120)    Negative: [1] Headache AND [2] stiff neck (can't touch chin to chest)    Negative: MILD difficulty breathing (e.g., minimal/no SOB at rest, SOB with walking, pulse <100)    Negative: Chest pain or pressure    Negative: Patient sounds very sick or weak to the triager    Negative: Fever > 103 F (39.4 C)    Negative: [1] Fever > 101 F (38.3 C) AND [2] age > 60 years    Negative: [1] Fever > 100.0 F (37.8 C) AND [2] bedridden (e.g., nursing home patient, CVA, chronic illness, recovering from surgery)    Negative: HIGH RISK for severe COVID complications (e.g., age > 64 years, obesity with BMI > 25, pregnant, chronic lung disease or other chronic medical condition)   (Exception: Already seen by PCP and no new or worsening symptoms.)    Negative: [1] HIGH RISK patient AND [2] influenza is widespread in the community AND [3] ONE OR MORE respiratory symptoms: cough, sore throat, runny or stuffy nose    Negative: [1] HIGH RISK patient AND [2] influenza exposure within the last 7 days AND [3] ONE OR MORE respiratory symptoms: cough, sore throat, runny or stuffy nose    Negative: [1] COVID-19 infection suspected by caller or triager AND [2] mild symptoms (cough, fever, or others) AND [3] negative COVID-19 rapid test    Negative: Fever present > 3 days (72 hours)    Negative: [1] Fever returns after gone for over 24 hours AND [2] symptoms worse or not improved    Negative: [1] Continuous (nonstop) coughing interferes with work or school AND [2] no improvement using cough treatment per protocol    Negative: Cough present > 3 weeks    Negative: [1] COVID-19 diagnosed by positive lab test AND [2] NO symptoms (e.g., cough, fever, others)    Negative: Sounds like a life-threatening emergency to the triager    Negative: Difficulty breathing, and not from stuffy nose (e.g., not relieved by cleaning out the nose)    Negative: SEVERE headache and has fever    Negative: Patient sounds very sick or weak to the triager    Negative: SEVERE sinus pain    Negative: Severe headache    Negative: Redness or swelling on the cheek, forehead, or around the eye    Negative: Fever > 103 F (39.4 C)    Negative: Fever > 101 F (38.3 C) and over 60 years of age    Negative: Fever > 100.0 F (37.8 C) and has diabetes mellitus or a weak immune system (e.g., HIV positive, cancer chemotherapy, organ transplant, splenectomy, chronic steroids)    Negative: Fever > 100.0 F (37.8 C) and bedridden (e.g., nursing home patient, stroke, chronic illness, recovering from surgery)    Negative: Fever present > 3 days (72 hours)    Negative: Fever returns after gone for over 24 hours and symptoms worse or not improved     Negative: Sinus pain (not just congestion) and fever    Negative: Earache    Negative: Sinus congestion (pressure, fullness) present > 10 days    Negative: Nasal discharge present > 10 days    Negative: Using nasal washes and pain medicine > 24 hours and sinus pain (lower forehead, cheekbone, or eye) persists    Negative: Lots of coughing    Negative: Patient wants to be seen    Protocols used: CORONAVIRUS (COVID-19) DIAGNOSED OR [id number]-D-AF 8.25.2021, SINUS PAIN OR CONGESTION-A-OH

## 2022-01-26 ENCOUNTER — TELEPHONE (OUTPATIENT)
Dept: INTERNAL MEDICINE | Facility: CLINIC | Age: 62
End: 2022-01-26
Payer: COMMERCIAL

## 2022-01-26 NOTE — TELEPHONE ENCOUNTER
Reason for Call:  Other call back    Detailed comments: Pt tested positive 3 times now, once last wed with a home kit, Saturday with a home kit, and yesterday he tested and got the results today of being positive. HE is purplexed on what he needs to do. The only symptom that he is experiencing is he feels like he has a sinus infection, not experiencing anything else and that about it. Can anything be prescribed or how to go about this?    Phone Number Patient can be reached at: Cell number on file:    Telephone Information:   Mobile [phone]       Best Time: any    Can we leave a detailed message on this number? YES    Call taken on 1/26/2022 at 3:43 PM by Gonzalo Gonzalez

## 2022-03-07 ENCOUNTER — VIRTUAL VISIT (OUTPATIENT)
Dept: SURGERY | Facility: CLINIC | Age: 62
End: 2022-03-07
Payer: COMMERCIAL

## 2022-03-07 DIAGNOSIS — Z90.3 HISTORY OF SLEEVE GASTRECTOMY: ICD-10-CM

## 2022-03-07 DIAGNOSIS — Z71.3 NUTRITIONAL COUNSELING: ICD-10-CM

## 2022-03-07 DIAGNOSIS — K91.2 POSTOPERATIVE MALABSORPTION: Primary | ICD-10-CM

## 2022-03-07 PROCEDURE — 97803 MED NUTRITION INDIV SUBSEQ: CPT | Mod: 95 | Performed by: DIETITIAN, REGISTERED

## 2022-03-07 NOTE — LETTER
3/7/2022         RE: Nick Coley  [address] [place] [address]        Dear Colleague,    Thank you for referring your patient, Nick Coley, to the [facility] [place]. Please see a copy of my visit note below.    Nick Coley is a 62 year old who is being evaluated via a billable video visit.      How would you like to obtain your AVS? MyChart  If the video visit is dropped, the invitation should be resent by: Send to e-mail at: [email or web address]  Will anyone else be joining your video visit? No      Video Start Time: 8:47 AM      Post-op Surgical Weight Loss Diet Evaluation     Assessment:  Pt presents for 9 months post-op RD visit, s/p LSG on 6/15/2021 with Dr. Olmos. Today we reviewed current eating habits and level of physical activity, and instructed on the changes that are required for successful bariatric outcomes.    Patient Progress: going well, no major concerns    Initial weight: 338 lbs  Current weight: 268 lbs   Weight change: 70 lbs (down)     There is no height or weight on file to calculate BMI.    Patient Active Problem List   Diagnosis     Snoring     Shifting sleep-work schedule     ANAM (obstructive sleep apnea)     Abnormal liver function     Calculus of kidney     Chronic tension-type headache, not intractable     Essential hypertension     History of colon polyps     Hyperlipidemia     Irritable bowel syndrome     Low back pain     Microscopic hematuria     Vitamin D deficiency     Nephrolithiasis     Moderate major depression (H)     Fatty liver     Morbid obesity (H)     History of gastric bypass     Numbness of left anterior thigh       Diabetes: No     Vitamins   Multi Vit with Iron: yes  Calcium Citrate: yes  B12: yes  D3: yes    Diet Recall/Time:   Breakfast: Protein Shake or Homemade Smoothie (frozen fruit, yogurt, almond milk, honey, protein powder)  Lunch: chicken   Dinner: chicken or Protein Shake     Typical  "Snacks: spoonful of PB, almonds, SF Jell-O, SF applesauce    Proteins/Veg/Fruits/CHO (NOT well tolerated): none    Estimated protein intake: 60+ grams    Estimated portion size per meals:breakfast seems to be smaller, tends to be larger at lunch and supper around 1 cup/meal      Meal Duration:20-30 minutes    Fluid-meal separation:  Fluids are  30min before and 30 minutes after meals.    Fluid Intake  Water: at least 60 oz/day, also utilizing Zero Sugar Flavored Bob, Gatorade Zero Sugar Protein Drink, Sugar Free Apple Juice  Carbonation: Sparkling Ice (on occasion)     Exercise: no set regimen, trying to get on the treadmill more frequently, otherwise is pretty busy at work (working 7 days/week)--walking some at work [id number] steps/day      PES statement:      (NC-1.4) Altered GI Function related to Alteration in gastrointestinal tract structure and/or function/ Decreased functional length of the GI tract as evidenced by Weight loss of 21% initial body weight; sleeve gastrectomy      Intervention    Discussion  1. Discussed 9 months Post-Op Nutritional Guidelines for LSG  2. Recommended to consume 15-20gm protein at 3 meals daily, along with protein supplement/\"planned protein containing snack\" of 15-30gm protein, to reach goal of 60-80 gm protein daily.  3. Educated on post-op vitamin regimen: Multi Vit + iron 2x/day, calcium citrate 400-600 mg 2x/day, 7104-0627 mcg of Sublingual B-12 daily, and 5000 IU Vitamin D3 daily (MVI and calcium can be taken at the same time BID)  4. Reviewed lean protein sources  5. Bariatric Plate Method-  including lean/low fat protein at each meal, including a vegetable/fruit, and limiting carbohydrate intake to less than 25% of plate volume.     Instructions  1. Include 15-20gm protein at each meal, along with protein supplement/\"planned protein containing snack\" of 15-30gm protein, to reach goal of 60-80 gm protein daily.  2. Increase fluid intake to 64oz daily: choose " plain or calorie/carbonation-free beverages.  3. Incorporate daily structured activity, 30-60 minutes most days of the week  4. Recommended pt to start taking: Multi Vit + iron 2x/day, calcium citrate 400-600 mg 2x/day, 1410-9659 mcg of Sublingual B-12 daily, and 5000 IU Vitamin D3 daily. (MVI and calcium can be taken at the same time)  5. Read food labels more consistently: keeping total fat grams <10, total sugar grams <10, fiber >3gm per serving.  6. Increase vegetable/fruit intake, by having a vegetable or fruit with each meal daily.  7. Practice plate method: 1/2 plate lean/low fat protein source, vegetable/fruit, <25% of plate complex carbohydrates.  8. Separate fluids 30 minutes before/after meal times.  9. Practice eating off of smaller plates/bowls, chewing to applesauce consistency, taking 20-30 minutes to eat in a calm/relaxed environment without distractions of tv/email/cell phone.    Handouts provided:  9 Months Post-Op Nutritional Guidelines for LSG    Assessment/Plan:    Pt to follow up for 1 year post-op visit with bariatrician     Video-Visit Details    Type of service:  Video Visit    Video End Time:9:18 AM    Originating Location (pt. Location): Home    Distant Location (provider location):  [facility] [place]     Platform used for Video Visit: Melodie Cerda RD          Again, thank you for allowing me to participate in the care of your patient.        Sincerely,        Zaina Cerda RD

## 2022-03-07 NOTE — PROGRESS NOTES
Nick Coley is a 62 year old who is being evaluated via a billable video visit.      How would you like to obtain your AVS? MyChart  If the video visit is dropped, the invitation should be resent by: Send to e-mail at: [email or web address]  Will anyone else be joining your video visit? No      Video Start Time: 8:47 AM      Post-op Surgical Weight Loss Diet Evaluation     Assessment:  Pt presents for 9 months post-op RD visit, s/p LSG on 6/15/2021 with Dr. Olmos. Today we reviewed current eating habits and level of physical activity, and instructed on the changes that are required for successful bariatric outcomes.    Patient Progress: going well, no major concerns    Initial weight: 338 lbs  Current weight: 268 lbs   Weight change: 70 lbs (down)     There is no height or weight on file to calculate BMI.    Patient Active Problem List   Diagnosis     Snoring     Shifting sleep-work schedule     ANAM (obstructive sleep apnea)     Abnormal liver function     Calculus of kidney     Chronic tension-type headache, not intractable     Essential hypertension     History of colon polyps     Hyperlipidemia     Irritable bowel syndrome     Low back pain     Microscopic hematuria     Vitamin D deficiency     Nephrolithiasis     Moderate major depression (H)     Fatty liver     Morbid obesity (H)     History of gastric bypass     Numbness of left anterior thigh       Diabetes: No     Vitamins   Multi Vit with Iron: yes  Calcium Citrate: yes  B12: yes  D3: yes    Diet Recall/Time:   Breakfast: Protein Shake or Homemade Smoothie (frozen fruit, yogurt, almond milk, honey, protein powder)  Lunch: chicken   Dinner: chicken or Protein Shake     Typical Snacks: spoonful of PB, almonds, SF Jell-O, SF applesauce    Proteins/Veg/Fruits/CHO (NOT well tolerated): none    Estimated protein intake: 60+ grams    Estimated portion size per meals:breakfast seems to be smaller, tends to be larger at lunch and supper around 1  "cup/meal      Meal Duration:20-30 minutes    Fluid-meal separation:  Fluids are  30min before and 30 minutes after meals.    Fluid Intake  Water: at least 60 oz/day, also utilizing Zero Sugar Flavored Bob, Gatorade Zero Sugar Protein Drink, Sugar Free Apple Juice  Carbonation: Sparkling Ice (on occasion)     Exercise: no set regimen, trying to get on the treadmill more frequently, otherwise is pretty busy at work (working 7 days/week)--walking some at work [id number] steps/day      PES statement:      (NC-1.4) Altered GI Function related to Alteration in gastrointestinal tract structure and/or function/ Decreased functional length of the GI tract as evidenced by Weight loss of 21% initial body weight; sleeve gastrectomy      Intervention    Discussion  1. Discussed 9 months Post-Op Nutritional Guidelines for LSG  2. Recommended to consume 15-20gm protein at 3 meals daily, along with protein supplement/\"planned protein containing snack\" of 15-30gm protein, to reach goal of 60-80 gm protein daily.  3. Educated on post-op vitamin regimen: Multi Vit + iron 2x/day, calcium citrate 400-600 mg 2x/day, 2460-3815 mcg of Sublingual B-12 daily, and 5000 IU Vitamin D3 daily (MVI and calcium can be taken at the same time BID)  4. Reviewed lean protein sources  5. Bariatric Plate Method-  including lean/low fat protein at each meal, including a vegetable/fruit, and limiting carbohydrate intake to less than 25% of plate volume.     Instructions  1. Include 15-20gm protein at each meal, along with protein supplement/\"planned protein containing snack\" of 15-30gm protein, to reach goal of 60-80 gm protein daily.  2. Increase fluid intake to 64oz daily: choose plain or calorie/carbonation-free beverages.  3. Incorporate daily structured activity, 30-60 minutes most days of the week  4. Recommended pt to start taking: Multi Vit + iron 2x/day, calcium citrate 400-600 mg 2x/day, 7464-3259 mcg of Sublingual B-12 daily, and " 5000 IU Vitamin D3 daily. (MVI and calcium can be taken at the same time)  5. Read food labels more consistently: keeping total fat grams <10, total sugar grams <10, fiber >3gm per serving.  6. Increase vegetable/fruit intake, by having a vegetable or fruit with each meal daily.  7. Practice plate method: 1/2 plate lean/low fat protein source, vegetable/fruit, <25% of plate complex carbohydrates.  8. Separate fluids 30 minutes before/after meal times.  9. Practice eating off of smaller plates/bowls, chewing to applesauce consistency, taking 20-30 minutes to eat in a calm/relaxed environment without distractions of tv/email/cell phone.    Handouts provided:  9 Months Post-Op Nutritional Guidelines for LSG    Assessment/Plan:    Pt to follow up for 1 year post-op visit with bariatrician     Video-Visit Details    Type of service:  Video Visit    Video End Time:9:18 AM    Originating Location (pt. Location): Home    Distant Location (provider location):  [facility] [place]     Platform used for Video Visit: Melodie Cerda RD

## 2022-05-02 ENCOUNTER — OFFICE VISIT (OUTPATIENT)
Dept: INTERNAL MEDICINE | Facility: CLINIC | Age: 62
End: 2022-05-02
Payer: COMMERCIAL

## 2022-05-02 VITALS
BODY MASS INDEX: 41.22 KG/M2 | SYSTOLIC BLOOD PRESSURE: 126 MMHG | WEIGHT: 271.1 LBS | OXYGEN SATURATION: 97 % | DIASTOLIC BLOOD PRESSURE: 62 MMHG | HEART RATE: 57 BPM

## 2022-05-02 DIAGNOSIS — E66.01 MORBID OBESITY (H): ICD-10-CM

## 2022-05-02 DIAGNOSIS — M54.50 ACUTE BILATERAL LOW BACK PAIN WITHOUT SCIATICA: Primary | ICD-10-CM

## 2022-05-02 DIAGNOSIS — I10 ESSENTIAL HYPERTENSION: ICD-10-CM

## 2022-05-02 DIAGNOSIS — Z98.84 HISTORY OF GASTRIC BYPASS: ICD-10-CM

## 2022-05-02 DIAGNOSIS — F33.42 RECURRENT MAJOR DEPRESSIVE DISORDER, IN FULL REMISSION (H): ICD-10-CM

## 2022-05-02 PROCEDURE — 99214 OFFICE O/P EST MOD 30 MIN: CPT | Performed by: INTERNAL MEDICINE

## 2022-05-02 RX ORDER — TIZANIDINE 2 MG/1
2 TABLET ORAL 3 TIMES DAILY PRN
Qty: 30 TABLET | Refills: 0 | Status: SHIPPED | OUTPATIENT
Start: 2022-05-02 | End: 2024-01-19

## 2022-05-02 RX ORDER — METHYLPREDNISOLONE 4 MG
TABLET, DOSE PACK ORAL
Qty: 21 TABLET | Refills: 0 | Status: SHIPPED | OUTPATIENT
Start: 2022-05-02 | End: 2022-06-17

## 2022-05-02 RX ORDER — LISINOPRIL 20 MG/1
20 TABLET ORAL DAILY
COMMUNITY
Start: 2021-05-27 | End: 2022-11-14

## 2022-05-02 ASSESSMENT — ANXIETY QUESTIONNAIRES
6. BECOMING EASILY ANNOYED OR IRRITABLE: NOT AT ALL
5. BEING SO RESTLESS THAT IT IS HARD TO SIT STILL: NOT AT ALL
GAD7 TOTAL SCORE: 0
1. FEELING NERVOUS, ANXIOUS, OR ON EDGE: NOT AT ALL
IF YOU CHECKED OFF ANY PROBLEMS ON THIS QUESTIONNAIRE, HOW DIFFICULT HAVE THESE PROBLEMS MADE IT FOR YOU TO DO YOUR WORK, TAKE CARE OF THINGS AT HOME, OR GET ALONG WITH OTHER PEOPLE: NOT DIFFICULT AT ALL
3. WORRYING TOO MUCH ABOUT DIFFERENT THINGS: NOT AT ALL
7. FEELING AFRAID AS IF SOMETHING AWFUL MIGHT HAPPEN: NOT AT ALL
2. NOT BEING ABLE TO STOP OR CONTROL WORRYING: NOT AT ALL

## 2022-05-02 ASSESSMENT — PATIENT HEALTH QUESTIONNAIRE - PHQ9: 5. POOR APPETITE OR OVEREATING: NOT AT ALL
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
## 2022-05-02 NOTE — PATIENT INSTRUCTIONS
Avoid lifting anything over 15 pounds and avoid your usual weightlifting program until symptoms resolve.  You may continue walking.

## 2022-05-03 ASSESSMENT — ANXIETY QUESTIONNAIRES: GAD7 TOTAL SCORE: 0

## 2022-05-05 ENCOUNTER — MYC MEDICAL ADVICE (OUTPATIENT)
Dept: INTERNAL MEDICINE | Facility: CLINIC | Age: 62
End: 2022-05-05
Payer: COMMERCIAL

## 2022-05-05 DIAGNOSIS — R12 HEARTBURN: ICD-10-CM

## 2022-05-05 DIAGNOSIS — R10.13 ABDOMINAL PAIN, EPIGASTRIC: ICD-10-CM

## 2022-05-05 DIAGNOSIS — I10 ESSENTIAL HYPERTENSION: ICD-10-CM

## 2022-05-05 DIAGNOSIS — Z90.3 HISTORY OF SLEEVE GASTRECTOMY: ICD-10-CM

## 2022-05-05 RX ORDER — LISINOPRIL 10 MG/1
10 TABLET ORAL DAILY
Qty: 90 TABLET | Refills: 3 | Status: SHIPPED | OUTPATIENT
Start: 2022-05-05 | End: 2024-01-10

## 2022-05-09 ENCOUNTER — APPOINTMENT (OUTPATIENT)
Dept: CT IMAGING | Facility: HOSPITAL | Age: 62
End: 2022-05-09
Attending: EMERGENCY MEDICINE
Payer: COMMERCIAL

## 2022-05-09 LAB
ALBUMIN UR-MCNC: 20 MG/DL
ANION GAP SERPL CALCULATED.3IONS-SCNC: 10 MMOL/L (ref 5–18)
APPEARANCE UR: ABNORMAL
BASOPHILS # BLD AUTO: 0.1 10E3/UL (ref 0–0.2)
BASOPHILS NFR BLD AUTO: 1 %
BILIRUB UR QL STRIP: NEGATIVE
BUN SERPL-MCNC: 20 MG/DL (ref 8–22)
CALCIUM SERPL-MCNC: 9.3 MG/DL (ref 8.5–10.5)
CAOX CRY #/AREA URNS HPF: ABNORMAL /HPF
CHLORIDE BLD-SCNC: 105 MMOL/L (ref 98–107)
CO2 SERPL-SCNC: 26 MMOL/L (ref 22–31)
COLOR UR AUTO: YELLOW
CREAT SERPL-MCNC: 1.06 MG/DL (ref 0.7–1.3)
EOSINOPHIL # BLD AUTO: 0.2 10E3/UL (ref 0–0.7)
EOSINOPHIL NFR BLD AUTO: 2 %
ERYTHROCYTE [DISTWIDTH] IN BLOOD BY AUTOMATED COUNT: 13.9 % (ref 10–15)
GFR SERPL CREATININE-BSD FRML MDRD: 80 ML/MIN/1.73M2
GLUCOSE BLD-MCNC: 95 MG/DL (ref 70–125)
GLUCOSE UR STRIP-MCNC: NEGATIVE MG/DL
HCT VFR BLD AUTO: 45.5 % (ref 40–53)
HGB BLD-MCNC: 15.1 G/DL (ref 13.3–17.7)
HGB UR QL STRIP: ABNORMAL
IMM GRANULOCYTES # BLD: 0 10E3/UL
IMM GRANULOCYTES NFR BLD: 0 %
KETONES UR STRIP-MCNC: NEGATIVE MG/DL
LEUKOCYTE ESTERASE UR QL STRIP: ABNORMAL
LYMPHOCYTES # BLD AUTO: 2.2 10E3/UL (ref 0.8–5.3)
LYMPHOCYTES NFR BLD AUTO: 26 %
MCH RBC QN AUTO: 28.8 PG (ref 26.5–33)
MCHC RBC AUTO-ENTMCNC: 33.2 G/DL (ref 31.5–36.5)
MCV RBC AUTO: 87 FL (ref 78–100)
MONOCYTES # BLD AUTO: 0.5 10E3/UL (ref 0–1.3)
MONOCYTES NFR BLD AUTO: 6 %
MUCOUS THREADS #/AREA URNS LPF: PRESENT /LPF
NEUTROPHILS # BLD AUTO: 5.6 10E3/UL (ref 1.6–8.3)
NEUTROPHILS NFR BLD AUTO: 65 %
NITRATE UR QL: NEGATIVE
NRBC # BLD AUTO: 0 10E3/UL
NRBC BLD AUTO-RTO: 0 /100
PH UR STRIP: 5.5 [PH] (ref 5–7)
PLATELET # BLD AUTO: 190 10E3/UL (ref 150–450)
POTASSIUM BLD-SCNC: 3.8 MMOL/L (ref 3.5–5)
RBC # BLD AUTO: 5.24 10E6/UL (ref 4.4–5.9)
RBC URINE: >182 /HPF
SODIUM SERPL-SCNC: 141 MMOL/L (ref 136–145)
SP GR UR STRIP: 1.02 (ref 1–1.03)
SQUAMOUS EPITHELIAL: <1 /HPF
UROBILINOGEN UR STRIP-MCNC: <2 MG/DL
WBC # BLD AUTO: 8.5 10E3/UL (ref 4–11)
WBC URINE: 21 /HPF

## 2022-05-09 PROCEDURE — 74176 CT ABD & PELVIS W/O CONTRAST: CPT

## 2022-05-09 PROCEDURE — 81001 URINALYSIS AUTO W/SCOPE: CPT | Performed by: STUDENT IN AN ORGANIZED HEALTH CARE EDUCATION/TRAINING PROGRAM

## 2022-05-09 PROCEDURE — 87635 SARS-COV-2 COVID-19 AMP PRB: CPT | Performed by: EMERGENCY MEDICINE

## 2022-05-09 PROCEDURE — 250N000011 HC RX IP 250 OP 636: Performed by: EMERGENCY MEDICINE

## 2022-05-09 PROCEDURE — 81001 URINALYSIS AUTO W/SCOPE: CPT | Performed by: EMERGENCY MEDICINE

## 2022-05-09 PROCEDURE — 96361 HYDRATE IV INFUSION ADD-ON: CPT

## 2022-05-09 PROCEDURE — 99284 EMERGENCY DEPT VISIT MOD MDM: CPT | Mod: 25

## 2022-05-09 PROCEDURE — 36415 COLL VENOUS BLD VENIPUNCTURE: CPT | Performed by: EMERGENCY MEDICINE

## 2022-05-09 PROCEDURE — 85025 COMPLETE CBC W/AUTO DIFF WBC: CPT | Performed by: EMERGENCY MEDICINE

## 2022-05-09 PROCEDURE — 258N000003 HC RX IP 258 OP 636: Performed by: EMERGENCY MEDICINE

## 2022-05-09 PROCEDURE — 250N000013 HC RX MED GY IP 250 OP 250 PS 637: Performed by: EMERGENCY MEDICINE

## 2022-05-09 PROCEDURE — 87086 URINE CULTURE/COLONY COUNT: CPT | Performed by: EMERGENCY MEDICINE

## 2022-05-09 PROCEDURE — 96374 THER/PROPH/DIAG INJ IV PUSH: CPT

## 2022-05-09 PROCEDURE — C9803 HOPD COVID-19 SPEC COLLECT: HCPCS

## 2022-05-09 PROCEDURE — 80048 BASIC METABOLIC PNL TOTAL CA: CPT | Performed by: EMERGENCY MEDICINE

## 2022-05-09 RX ORDER — KETOROLAC TROMETHAMINE 30 MG/ML
15 INJECTION, SOLUTION INTRAMUSCULAR; INTRAVENOUS ONCE
Status: COMPLETED | OUTPATIENT
Start: 2022-05-09 | End: 2022-05-09

## 2022-05-09 RX ORDER — ACETAMINOPHEN 325 MG/1
650 TABLET ORAL ONCE
Status: COMPLETED | OUTPATIENT
Start: 2022-05-09 | End: 2022-05-09

## 2022-05-09 RX ORDER — DIMENHYDRINATE 50 MG
50 TABLET ORAL ONCE
Status: COMPLETED | OUTPATIENT
Start: 2022-05-09 | End: 2022-05-09

## 2022-05-09 RX ADMIN — ACETAMINOPHEN 650 MG: 325 TABLET ORAL at 23:50

## 2022-05-09 RX ADMIN — KETOROLAC TROMETHAMINE 15 MG: 30 INJECTION, SOLUTION INTRAMUSCULAR at 23:51

## 2022-05-09 RX ADMIN — SODIUM CHLORIDE 1000 ML: 9 INJECTION, SOLUTION INTRAVENOUS at 23:45

## 2022-05-09 RX ADMIN — DIMENHYDRINATE 50 MG: 50 TABLET ORAL at 23:50

## 2022-05-10 ENCOUNTER — HOSPITAL ENCOUNTER (EMERGENCY)
Facility: HOSPITAL | Age: 62
Discharge: HOME | End: 2022-05-10
Attending: EMERGENCY MEDICINE | Admitting: EMERGENCY MEDICINE
Payer: COMMERCIAL

## 2022-05-10 VITALS
BODY MASS INDEX: 40.92 KG/M2 | OXYGEN SATURATION: 95 % | RESPIRATION RATE: 18 BRPM | TEMPERATURE: 97.8 F | WEIGHT: 270 LBS | DIASTOLIC BLOOD PRESSURE: 80 MMHG | HEIGHT: 68 IN | HEART RATE: 60 BPM | SYSTOLIC BLOOD PRESSURE: 140 MMHG

## 2022-05-10 DIAGNOSIS — N20.0 CALCULUS OF KIDNEY: ICD-10-CM

## 2022-05-10 LAB — SARS-COV-2 RNA RESP QL NAA+PROBE: NEGATIVE

## 2022-05-10 RX ORDER — DIMENHYDRINATE 50 MG
50 TABLET ORAL EVERY 6 HOURS PRN
Qty: 28 TABLET | Refills: 0 | Status: SHIPPED | OUTPATIENT
Start: 2022-05-10 | End: 2022-05-17

## 2022-05-10 RX ORDER — ACETAMINOPHEN 500 MG
1000 TABLET ORAL EVERY 6 HOURS
Qty: 56 TABLET | Refills: 0 | Status: SHIPPED | OUTPATIENT
Start: 2022-05-10 | End: 2022-05-17

## 2022-05-10 NOTE — ED TRIAGE NOTES
Patient reports that that he has lower abdominal pain that extends into his penis. Patient reports he feels like he has a kidney stone that may have moved. Patient reports that for 3 weeks prior he had pain in the small of his back which was treated by his PCP. Patient states he also has urinary frequency.

## 2022-05-10 NOTE — ED PROVIDER NOTES
EMERGENCY DEPARTMENT ENCOUNTER      NAME: Nick Coley  AGE: 62 year old male  YOB: 1960  MRN: [id number]  EVALUATION DATE & TIME: No admission date for patient encounter.    PCP: Long Weston    ED PROVIDER: Marium Melvin M.D.      Chief Complaint   Patient presents with     Abdominal Pain         FINAL IMPRESSION:  1. Calculus of kidney          ED COURSE & MEDICAL DECISION MAKING:    ED Course as of 05/10/22 0341   Mon May 09, 2022   2237 Pt with left flank pain like prior 5x ureterolithiasis with prior surgical removal required for large ureterolithiasis and s/p nephrostomy remotely for large obstructive nephrolithiasis. Given history of prior large ureterolithaisis requiring intervention and persistent symptoms, will CT abdomen to check size/location and COVID19 PCR in case of need for procedure. Pt given toradol/tylenol/dramamine for symptom control with IVF, UA with 21 WBC but over 182 RBC thus no UTI likely superimposed   Tue May 10, 2022   0003 Creatinine WNL 1.06   0004 Pt with two right UVJ ureterolithiasis, biggest of which is 5mm thus very likely to spontaneously pass. As patient has received IVF here and does NOT have signs of infection, nromal VS, normal WBC and with UA reassuring, will give close f/up with [facility] who he has seen before and urine strainer and symptomatic therapy, patient is ok with this plan. Patient discharged after being provided with extensive anticipatory guidance and given return precautions, importance of PMD follow-up emphasized. He says he feels improved in ED and ok with f/unit(s) with [facility] who he has seen previously.   10:35 PM I met with the patient, obtained history, performed an initial exam, and discussed options and plan for diagnostics and treatment here in the ED.    Results were communicated with the patient. Discussed discharge plans along with return precautions. Patient was agreeable with plan.        Pertinent Labs & Imaging  studies reviewed. (See chart for details)    N95 worn  A face shield was worn also  COVID PPE      At the conclusion of the encounter I discussed the results of all of the tests and the disposition. The questions were answered. The patient or family acknowledged understanding and was agreeable with the care plan.     MEDICATIONS GIVEN IN THE EMERGENCY:  Medications   0.9% sodium chloride BOLUS (0 mLs Intravenous Stopped 5/10/22 0022)   ketorolac (TORADOL) injection 15 mg (15 mg Intravenous Given 5/9/22 2351)   acetaminophen (TYLENOL) tablet 650 mg (650 mg Oral Given 5/9/22 2350)   dimenhyDRINATE (DRAMAMINE) tablet 50 mg (50 mg Oral Given 5/9/22 2350)       NEW PRESCRIPTIONS STARTED AT TODAY'S ER VISIT  Discharge Medication List as of 5/10/2022 12:26 AM      START taking these medications    Details   acetaminophen (TYLENOL) 500 MG tablet Take 2 tablets (1,000 mg) by mouth every 6 hours for 7 days, Disp-56 tablet, R-0, E-Prescribe      dimenhyDRINATE (DRAMAMINE) 50 MG tablet Take 1 tablet (50 mg) by mouth every 6 hours as needed for other (kidney stone pain management), Disp-28 tablet, R-0, E-Prescribe                =================================================================    HPI      Nick Coley is a 62 year old male with PMHx of hypertension, hyperlipidemia, morbid obese, prior gastric bypass, recurrent kidney stones, who presents to the ED today via walk in with abdominal pain.    Chart was reviewed:  5/2/22: [facility]: Patient presented with low back pain. XR Lumbar Spine was obtained and showed Normal vertebral body heights. 2 to 3 mm degenerative anterolisthesis of L4 on L5. Sagittal alignment is otherwise normal. Mild multilevel degenerative disc disease with endplate sclerosis and osteophyte formation. Lower lumbar facet osteoarthrosis. Normal appearance of the sacrum and sacroiliac joints. Nonobstructive bowel gas pattern. There are vascular calcifications.  "Amorphous calcification adjacent to the pubic symphysis likely within the prostate gland. Provider did prescribe muscle relaxants and steroids and discharged home in stable condition.     Patient reports developing initial mild lower abdominal pain a week ago that had progressively worsened tonight which prompted him to come into ED. He says this does feel similar to his prior kidney stones. He has had 5 prior total kidney stones with his last surgery a year ago in March where they \"went through the back with a tube\" and follows up with [facility] although his provider did retire. He says that with his prior kidney stones, he always gets different pain in different locations. Today, he feels like the pain in the urethra. Although no dysuria. He is currently on medications for his weight loss surgery and has been having darker urine but unsure if he is having hematuria or just from the medications. At present while he is resting, his pain is 4/10 in severity. Prior to arrival he did take 1 tablet of Tizanidine in the morning, another at 2PM and 2 more tablets a half hour prior to arrival. He can only take Advil since his weight loss surgery with no Tylenol or Ibuprofen use. Otherwise no fever, nausea, vomiting, diarrhea. No other medical complaints at this time.     REVIEW OF SYSTEMS   All other systems reviewed and are negative except as noted above in HPI.    PAST MEDICAL HISTORY:  Past Medical History:   Diagnosis Date     Abnormal LFTs (liver function tests) 12/18/2015    Negative hepatitis C antibody and ferritin of 330 6 December 2015, steatohepatitis suspected     Acute bilateral low back pain without sciatica 5/2/2022     Acute blood loss anemia 03/18/2021     WHIT (acute kidney injury) (H) 03/18/2021     Benign essential hypertension      Cellulitis of left lower extremity 05/29/2020     Cellulitis, face 2014     Chronic kidney disease      Chronic tension-type headache, not intractable 06/10/2019     " Depression with anxiety      Essential hypertension 12/18/2015     Fatty liver     ultrasound 2001 demonstrated.     Hematoma of leg, left, subsequent encounter 07/23/2018     History of colon polyps     Colonoscopy November 2018 with multiple polyps removed, repeat in 3 years     History of gastric bypass     Normal sleeve gastrectomy June 2021     Hyperlipidemia      Irritable bowel syndrome      Kidney stones     x 4     LBP (low back pain)      Microscopic hematuria 07/23/2018     Moderate major depression (H) 06/10/2019     Morbid obesity (H) 12/18/2015    Normal sleeve gastrectomy June 2021     Nephrolithiasis 12/2020    Right     Nephrolithiasis     2013, 2009, 2001.  Right percutaneous nephrolithotomy March 2021.  Stone analysis 90% uric acid and 10% calcium oxalate     Numbness of left anterior thigh 11/9/2021    Numbness and burning left anterior thigh above his knee     ANAM (obstructive sleep apnea) 01/2021    Severe     Sleep apnea     uses cpap     Suspected sleep apnea 11/16/2018     Tear of meniscus of right knee 12/18/2015    Associated with right tibial plateau fracture 2013, arthroscopic knee surgery December 2015     Vitamin D deficiency     Vitamin D 10.0 November 2018     Wrist fracture        PAST SURGICAL HISTORY:  Past Surgical History:   Procedure Laterality Date     CARPAL TUNNEL RELEASE RT/LT Right 2001     compound fracture Right     age14, right arm     EXTRACORPOREAL SHOCK WAVE LITHOTRIPSY  2013    for kidney stones     HC KNEE SCOPE,SINGLE MENISECTOMY Right 12/22/2015    Procedure: RIGHT KNEE ARTHROSCOPIC MENISCECTOMY;  Surgeon: Chandu Moyer MD;  Location: [facility];  Service: Orthopedics     KIDNEY STONE SURGERY Right     Right percutaneous nephrolithotomy March 2021     OTHER SURGICAL HISTORY      arm fracture repair     TX LAP, CHIDI RESTRICT PROC, LONGITUDINAL GASTRECTOMY N/A 6/15/2021    Procedure: GASTRECTOMY, SLEEVE, LAPAROSCOPIC;  Surgeon: Aubrey Olmos MD;   Location: [facility] OR;  Service: General     RELEASE CARPAL TUNNEL Right 2001     WISDOM TOOTH EXTRACTION  2013       CURRENT MEDICATIONS:    acetaminophen (TYLENOL) 500 MG tablet  dimenhyDRINATE (DRAMAMINE) 50 MG tablet  calcium citrate-vitamin D (CITRACAL) 315-200 MG-UNIT TABS per tablet  Cholecalciferol 125 MCG (5000 UT) TABS  citalopram (CELEXA) 20 MG tablet  cyanocobalamin (CYANOCOBALAMIN) 1000 MCG SUBL sublingual tablet  lisinopril (ZESTRIL) 10 MG tablet  lisinopril (ZESTRIL) 20 MG tablet  methylPREDNISolone (MEDROL DOSEPAK) 4 MG tablet therapy pack  omeprazole (PRILOSEC) 20 MG DR capsule  rosuvastatin (CRESTOR) 10 MG tablet  tiZANidine (ZANAFLEX) 2 MG tablet  UNABLE TO FIND  ursodiol (ACTIGALL) 300 MG capsule        ALLERGIES:  Allergies   Allergen Reactions     Adhesive Tape Rash     Silk tape not paper tape         FAMILY HISTORY:  Family History   Adopted: Yes   Problem Relation Age of Onset     Diabetes Mother      Anesthesia Reaction No family hx of        SOCIAL HISTORY:   Social History     Socioeconomic History     Marital status:    Tobacco Use     Smoking status: Never Smoker     Smokeless tobacco: Never Used     Tobacco comment: cigar once or twice a year   Substance and Sexual Activity     Alcohol use: Not Currently     Comment: Alcoholic Drinks/day: just during softball season or vacation-0-2     Drug use: No     Sexual activity: Yes     Partners: Female   Social History Narrative    , 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      Social Determinants of Health     Financial Resource Strain: Low Risk      Difficulty of Paying Living Expenses: Not hard at all   Food Insecurity: No Food Insecurity     Worried About Running Out of Food in the Last Year: Never true     Ran Out of Food in the Last Year: Never true   Transportation Needs: No Transportation Needs     Lack of Transportation (Medical): No     Lack of Transportation (Non-Medical): No   Physical Activity: Sufficiently Active     Days of  "Exercise per Week: 5 days     Minutes of Exercise per Session: 60 min   Stress: No Stress Concern Present     Feeling of Stress : Not at all   Social Connections: Socially Integrated     Frequency of Communication with Friends and Family: More than three times a week     Frequency of Social Gatherings with Friends and Family: Once a week     Attends [employer] Services: 1 to 4 times per year     Active Member of Clubs or Organizations: Yes     Marital Status:    Housing Stability: Low Risk      Unable to Pay for Housing in the Last Year: No     Number of Places Lived in the Last Year: 1     Unstable Housing in the Last Year: No       VITALS:  Patient Vitals for the past 24 hrs:   BP Temp Temp src Pulse Resp SpO2 Height Weight   05/10/22 0031 (!) 140/80 -- -- 60 18 -- -- --   05/09/22 2057 (!) 157/78 97.8  F (36.6  C) Oral 57 22 95 % -- --   05/09/22 2011 (!) 156/89 -- -- -- -- -- -- --   05/09/22 2009 -- 97.6  F (36.4  C) Temporal 73 20 95 % 1.727 m (5' 8\") 122.5 kg (270 lb)       PHYSICAL EXAM    GENERAL: Awake, alert.  In no acute distress.   HEENT: Normocephalic, atraumatic.  Pupils equal, round and reactive.  Conjunctiva normal.  EOMI.  NECK: No stridor or apparent deformity.  PULMONARY: Symmetrical breath sounds without distress.  Lungs clear to auscultation bilaterally without wheezes, rhonchi or rales.  CARDIO: Regular rate and rhythm.  No significant murmur, rub or gallop.  Radial pulses strong and symmetrical.  ABDOMINAL: Abdomen soft, non-distended and non-tender to palpation.  No CVAT, no palpable hepatosplenomegaly.  EXTREMITIES: No lower extremity swelling or edema.    NEURO: Alert and oriented to person, place and time.  Cranial nerves grossly intact.  No focal motor deficit.  PSYCH: Normal mood and affect  SKIN: No rashes      LAB:  All pertinent labs reviewed and interpreted.  Results for orders placed or performed during the hospital encounter of 05/10/22   CT Abdomen Pelvis w/o Contrast    " Impression    IMPRESSION:   1.  2 calculi at the right UVJ, the larger 5 mm. Mild right hydronephrosis.  2.  Punctate renal calculi.     UA with Microscopic reflex to Culture    Specimen: Urine, Midstream   Result Value Ref Range    Color Urine Yellow Colorless, Straw, Light Yellow, Yellow    Appearance Urine Turbid (A) Clear    Glucose Urine Negative Negative mg/dL    Bilirubin Urine Negative Negative    Ketones Urine Negative Negative mg/dL    Specific Gravity Urine 1.024 1.001 - 1.030    Blood Urine >1.0 mg/dL (A) Negative    pH Urine 5.5 5.0 - 7.0    Protein Albumin Urine 20  (A) Negative mg/dL    Urobilinogen Urine <2.0 <2.0 mg/dL    Nitrite Urine Negative Negative    Leukocyte Esterase Urine 25 Mery/uL (A) Negative    Mucus Urine Present (A) None Seen /LPF    Calcium Oxalate Crystals Urine Moderate (A) None Seen /HPF    RBC Urine >182 (H) <=2 /HPF    WBC Urine 21 (H) <=5 /HPF    Squamous Epithelials Urine <1 <=1 /HPF   Basic metabolic panel   Result Value Ref Range    Sodium 141 136 - 145 mmol/L    Potassium 3.8 3.5 - 5.0 mmol/L    Chloride 105 98 - 107 mmol/L    Carbon Dioxide (CO2) 26 22 - 31 mmol/L    Anion Gap 10 5 - 18 mmol/L    Urea Nitrogen 20 8 - 22 mg/dL    Creatinine 1.06 0.70 - 1.30 mg/dL    Calcium 9.3 8.5 - 10.5 mg/dL    Glucose 95 70 - 125 mg/dL    GFR Estimate 80 >60 mL/min/1.73m2   Asymptomatic COVID-19 Virus (Coronavirus) by PCR Nasopharyngeal    Specimen: Nasopharyngeal; Swab   Result Value Ref Range    SARS CoV2 PCR Negative Negative   CBC with platelets and differential   Result Value Ref Range    WBC Count 8.5 4.0 - 11.0 10e3/uL    RBC Count 5.24 4.40 - 5.90 10e6/uL    Hemoglobin 15.1 13.3 - 17.7 g/dL    Hematocrit 45.5 40.0 - 53.0 %    MCV 87 78 - 100 fL    MCH 28.8 26.5 - 33.0 pg    MCHC 33.2 31.5 - 36.5 g/dL    RDW 13.9 10.0 - 15.0 %    Platelet Count 190 150 - 450 10e3/uL    % Neutrophils 65 %    % Lymphocytes 26 %    % Monocytes 6 %    % Eosinophils 2 %    % Basophils 1 %    % Immature  Granulocytes 0 %    NRBCs per 100 WBC 0 <1 /100    Absolute Neutrophils 5.6 1.6 - 8.3 10e3/uL    Absolute Lymphocytes 2.2 0.8 - 5.3 10e3/uL    Absolute Monocytes 0.5 0.0 - 1.3 10e3/uL    Absolute Eosinophils 0.2 0.0 - 0.7 10e3/uL    Absolute Basophils 0.1 0.0 - 0.2 10e3/uL    Absolute Immature Granulocytes 0.0 <=0.4 10e3/uL    Absolute NRBCs 0.0 10e3/uL       RADIOLOGY:  Reviewed all pertinent imaging. Please see official radiology report.  CT Abdomen Pelvis w/o Contrast   Final Result   IMPRESSION:    1.  2 calculi at the right UVJ, the larger 5 mm. Mild right hydronephrosis.   2.  Punctate renal calculi.             I, Bell Ramirez, am serving as a scribe to document services personally performed by Dr. Marium Melvin based on my observation and the provider's statements to me. I, Marium Melvin MD attest that Bell Ramirez is acting in a scribe capacity, has observed my performance of the services and has documented them in accordance with my direction.     Marium Melvin MD  05/10/22 6550

## 2022-05-11 ENCOUNTER — TELEPHONE (OUTPATIENT)
Dept: INTERNAL MEDICINE | Facility: CLINIC | Age: 62
End: 2022-05-11
Payer: COMMERCIAL

## 2022-05-11 LAB — BACTERIA UR CULT: NORMAL

## 2022-05-11 NOTE — TELEPHONE ENCOUNTER
Let patient know Dr. Aburto is not in clinic today.  Patient would need to set up a virtual appointment to discuss this issue further, however.  Encourage patient to set up a virtual appointment with Dr. Aburto this week.  Patient does have the option of referral to the kidney stone [place] with Dr. Fuchs for intervention with a kidney stone.

## 2022-05-11 NOTE — TELEPHONE ENCOUNTER
Dr. Weston-ok to wait for Dr. Weston.      I called and spoke with patient and he is wanting your opinion.  He said that he was seen in the ER on Monday night and they gave him a rx for Dramamine and Tylenol for the pain.  He is wondering if you can look at the CT report.  The ER said that he should be able to pass these stones on his own but, he said that he has had stones before and has never passed them on his own and has always needed surgery, so he is doubtful that he will be able to pass these on his own.  He does have the rest of the week off of work.

## 2022-05-11 NOTE — TELEPHONE ENCOUNTER
Reason for Call:  Other appointment and call back    Detailed comments: Patient called and stated he was in the ED at [facility] 5/10 and what told he has kidney stone and that they will pass on there own. Patient states he is in a lot of pain and wondering if PCP could take a look at his CT scans and let him know if they will pass on there own or not. Please advise patient states he has a F/U appointment schedule but is not able to be seen until the 23rd. Patient is looking for a solution as soon as possible stating his is missing work due to this issue. Patient states this is his 6th time having kidney stones and every time he has not been able to pass them on his own. Patient is looking for advice and would like a call bad. Please advise     Phone Number Patient can be reached at: Home number on file [phone] (home)    Best Time: any    Can we leave a detailed message on this number? YES    Call taken on 5/11/2022 at 1:02 PM by Lilli Kovacs

## 2022-05-12 DIAGNOSIS — N20.0 NEPHROLITHIASIS: Primary | ICD-10-CM

## 2022-05-12 NOTE — TELEPHONE ENCOUNTER
This is good news.  He should keep the stone and should still make an appointment at the stone clinic given the recurrent nature.

## 2022-05-12 NOTE — TELEPHONE ENCOUNTER
Pt notified. He will make an appt with [facility] who he has been seeing in the past.

## 2022-05-12 NOTE — TELEPHONE ENCOUNTER
I spoke with patient, he states he was able to pass the stone this morning and is feeling much better and doesn't feel like he needs an appt at this time if you agree. Pt also askign if he should drop is stone off somewhere for testing? Please advise, thanks.

## 2022-05-13 ENCOUNTER — VIRTUAL VISIT (OUTPATIENT)
Dept: UROLOGY | Facility: CLINIC | Age: 62
End: 2022-05-13
Payer: COMMERCIAL

## 2022-05-13 DIAGNOSIS — N20.1 CALCULUS OF URETER: Primary | ICD-10-CM

## 2022-05-13 DIAGNOSIS — N13.2 HYDRONEPHROSIS WITH URINARY OBSTRUCTION DUE TO URETERAL CALCULUS: ICD-10-CM

## 2022-05-13 DIAGNOSIS — N20.0 NEPHROLITHIASIS, URIC ACID: ICD-10-CM

## 2022-05-13 DIAGNOSIS — N20.0 CALCULUS OF KIDNEY: ICD-10-CM

## 2022-05-13 PROCEDURE — 99203 OFFICE O/P NEW LOW 30 MIN: CPT | Mod: 95 | Performed by: PHYSICIAN ASSISTANT

## 2022-05-13 RX ORDER — TRAMADOL HYDROCHLORIDE 50 MG/1
50 TABLET ORAL EVERY 6 HOURS PRN
Qty: 12 TABLET | Refills: 0 | Status: SHIPPED | OUTPATIENT
Start: 2022-05-13 | End: 2022-05-16

## 2022-05-13 ASSESSMENT — PAIN SCALES - GENERAL: PAINLEVEL: MODERATE PAIN (4)

## 2022-05-13 NOTE — PROGRESS NOTES
Assessment/Plan:    Assessment & Plan   Nick was seen today for new patient.    Diagnoses and all orders for this visit:    Calculus of ureter  -     traMADol (ULTRAM) 50 MG tablet; Take 1 tablet (50 mg) by mouth every 6 hours as needed for severe pain  -     CT Abdomen Pelvis w/o Contrast - LOW DOSE; Future  -     Patient Stated Goal: Pass my stone    Hydronephrosis with urinary obstruction due to ureteral calculus    Calculus of kidney    Nephrolithiasis, uric acid    Stone Management Plan  Stone Management 5/13/2022   Urinary Tract Infection No suspicion of infection   Renal Colic Well controlled symptoms   Renal Failure No suspicion of renal failure   Current CT date 5/10/2022   Right sided stones? Yes   R Number of ureteral stones 2   R GSD of ureteral stones 8   R Location of ureteral stone Distal   R Number of kidney stones  1   R GSD of kidney stones < 2   R Hydronephrosis Mild   R Stone Event New event   Diagnosis date 5/10/2022   Initial location of primary symptomatic stone Distal   Initial GSD of primary symptomatic stone 8   R MET status Initiation   R Current Plan MET   MET 1 week F/U   Left sided stones? 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 Obstructing right distal ureteral stones, with interval passage of one stone. Residual right flank pain and voiding symptoms. Tiny, nonobstructing renal stones.    Will proceed with medical expulsive therapy. Risks and benefits were detailed of medical expulsive therapy including probability of stone passage, recurrent renal colic, and requirement of emergency  medical and/or surgical care and further imaging. Patient verbalized understanding. Patient agrees with plan as discussed. He will return in 1 weeks with low dose CT scan.    For symptom control, he was prescribed Tramadol. Over the counter symptom control medications of Dramamine and Tylenol were recommended.    Video call duration: 28 minutes  34 minutes spent on the date of the encounter doing chart review, history and exam, documentation and further activities per the note    Thuy Pimentel PA-C  [facility]    Subjective:     HPI  Mr. Nick Coley is a 62 year old  male who is being evaluated via a billable video visit by [facility] following [facility] ER visit for urolithiasis.    He is a rapidly recurrent Uric acid/CaOx stone former who has required numerous stone clearance procedures. He has followed with [facility], with last surgical intervention PCNL in March 2021. He notes his Urologist is retiring. He has participated in stone risk evaluation in the remote past with uncertain findings. He has identified modifiable stone risks including:  low urine volume, hypercalciuria, high urine sodium, hypocitraturia, and acidic urine. He has identified non-modifiable stone risks including:  bilateral stones and graeme-en-Y gastric bypass in June 2021.    He was evaluated in ER 5/9/22 for acute onset right lower abdominal pain, starting 1 week prior. The pain progressively worsened with radiation into the pubic region. It was sharp but fluctuated in severity. While in ER, pain was 4/10. He reported similar pain with prior kidney stones, followed by [facility]. He took tizanidine with some relief. Workup was notable for CT reporting 2 right UVJ stones. Labs were negative for acute infection or renal injury. He was sent home with otc medications dramamine and tylenol. Referral to [facility] was placed given established relationship. Follow up  correspondence with PCP prompted referral to our office. However, he passed the stone (s) in interval and related he would resume follow up with his established Urologist.    He passed a stone yesterday morning (~4 mm on visualization). However, he has had recurrent pain in the right abdomen/flank pain. Significant current symptoms include: 1/10 right flank pain, goes up to 8/10 with movement. He notes urinary urgency and interrupted stream. Pertinent negative current symptoms include:  fever, chills, left flank pain, nausea, vomiting, hematuria, urinary frequency and dysuria.     CT scan from 5/10/22 is personally reviewed and demonstrates mild right hydonephrosis with 2 adjacent right UVJ stones, largest 8 mm and 5 mm. Tiny bilateral renal stones.    Significant labs from presentation include severe hematuria, mild pyuria, negative nitrite, non-pathogenic organisms on urine culture, normal WBC, normal creatinine and normal potassium.    ROS   A 12 point comprehensive review of systems is negative except for HPI    Past Medical History:   Diagnosis Date     Abnormal LFTs (liver function tests) 12/18/2015    Negative hepatitis C antibody and ferritin of 330 6 December 2015, steatohepatitis suspected     Acute bilateral low back pain without sciatica 5/2/2022     Acute blood loss anemia 03/18/2021     WHIT (acute kidney injury) (H) 03/18/2021     Benign essential hypertension      Cellulitis of left lower extremity 05/29/2020     Cellulitis, face 2014     Chronic kidney disease      Chronic tension-type headache, not intractable 06/10/2019     Depression with anxiety      Essential hypertension 12/18/2015     Fatty liver     ultrasound 2001 demonstrated.     Hematoma of leg, left, subsequent encounter 07/23/2018     History of colon polyps     Colonoscopy November 2018 with multiple polyps removed, repeat in 3 years     History of gastric bypass     Normal sleeve gastrectomy June 2021     Hyperlipidemia      Irritable  bowel syndrome      Kidney stones     x 4     LBP (low back pain)      Microscopic hematuria 07/23/2018     Moderate major depression (H) 06/10/2019     Morbid obesity (H) 12/18/2015    Normal sleeve gastrectomy June 2021     Nephrolithiasis 12/2020    Right     Nephrolithiasis     2013, 2009, 2001.  Right percutaneous nephrolithotomy March 2021.  Stone analysis 90% uric acid and 10% calcium oxalate     Numbness of left anterior thigh 11/9/2021    Numbness and burning left anterior thigh above his knee     ANAM (obstructive sleep apnea) 01/2021    Severe     Sleep apnea     uses cpap     Suspected sleep apnea 11/16/2018     Tear of meniscus of right knee 12/18/2015    Associated with right tibial plateau fracture 2013, arthroscopic knee surgery December 2015     Vitamin D deficiency     Vitamin D 10.0 November 2018     Wrist fracture      Past Surgical History:   Procedure Laterality Date     CARPAL TUNNEL RELEASE RT/LT Right 2001     compound fracture Right     age14, right arm     EXTRACORPOREAL SHOCK WAVE LITHOTRIPSY  2013    for kidney stones     HC KNEE SCOPE,SINGLE MENISECTOMY Right 12/22/2015    Procedure: RIGHT KNEE ARTHROSCOPIC MENISCECTOMY;  Surgeon: Chandu Moyer MD;  Location: [facility] OR;  Service: Orthopedics     KIDNEY STONE SURGERY Right     Right percutaneous nephrolithotomy March 2021     OTHER SURGICAL HISTORY      arm fracture repair     IL LAP, CHIDI RESTRICT PROC, LONGITUDINAL GASTRECTOMY N/A 6/15/2021    Procedure: GASTRECTOMY, SLEEVE, LAPAROSCOPIC;  Surgeon: Aubrey Olmos MD;  Location: [facility] OR;  Service: General     RELEASE CARPAL TUNNEL Right 2001     WISDOM TOOTH EXTRACTION  2013     Current Outpatient Medications   Medication Sig Dispense Refill     acetaminophen (TYLENOL) 500 MG tablet Take 2 tablets (1,000 mg) by mouth every 6 hours for 7 days 56 tablet 0     calcium citrate-vitamin D (CITRACAL) 315-200 MG-UNIT TABS per tablet Take 2 tablets by mouth daily        Cholecalciferol 125 MCG (5000 UT) TABS Take 125 mcg by mouth daily       citalopram (CELEXA) 20 MG tablet Take 1 tablet (20 mg) by mouth daily 90 tablet 3     cyanocobalamin (CYANOCOBALAMIN) 1000 MCG SUBL sublingual tablet Place 1,000 mcg under the tongue daily       dimenhyDRINATE (DRAMAMINE) 50 MG tablet Take 1 tablet (50 mg) by mouth every 6 hours as needed for other (kidney stone pain management) 28 tablet 0     lisinopril (ZESTRIL) 10 MG tablet Take 1 tablet (10 mg) by mouth daily 90 tablet 3     lisinopril (ZESTRIL) 20 MG tablet Take 20 mg by mouth daily       methylPREDNISolone (MEDROL DOSEPAK) 4 MG tablet therapy pack Follow Package Directions 21 tablet 0     omeprazole (PRILOSEC) 20 MG DR capsule Take 1 capsule (20 mg) by mouth daily 90 capsule 3     rosuvastatin (CRESTOR) 10 MG tablet Take 1 tablet (10 mg) by mouth daily 90 tablet 3     tiZANidine (ZANAFLEX) 2 MG tablet Take 1 tablet (2 mg) by mouth 3 times daily as needed (back pain) 30 tablet 0     traMADol (ULTRAM) 50 MG tablet Take 1 tablet (50 mg) by mouth every 6 hours as needed for severe pain 12 tablet 0     UNABLE TO FIND MEDICATION NAME: Men One A Day Multivitamin       ursodiol (ACTIGALL) 300 MG capsule Take 1 capsule by mouth 2 times daily       Allergies   Allergen Reactions     Adhesive Tape Rash     Silk tape not paper tape         Social History     Socioeconomic History     Marital status:      Spouse name: Not on file     Number of children: Not on file     Years of education: Not on file     Highest education level: Not on file   Occupational History     Not on file   Tobacco Use     Smoking status: Never Smoker     Smokeless tobacco: Never Used     Tobacco comment: cigar once or twice a year   Substance and Sexual Activity     Alcohol use: Not Currently     Comment: Alcoholic Drinks/day: just during softball season or vacation-0-2     Drug use: No     Sexual activity: Yes     Partners: Female   Other Topics Concern     Not on  file   Social History Narrative    , 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      Social Determinants of Health     Financial Resource Strain: Low Risk      Difficulty of Paying Living Expenses: Not hard at all   Food Insecurity: No Food Insecurity     Worried About Running Out of Food in the Last Year: Never true     Ran Out of Food in the Last Year: Never true   Transportation Needs: No Transportation Needs     Lack of Transportation (Medical): No     Lack of Transportation (Non-Medical): No   Physical Activity: Sufficiently Active     Days of Exercise per Week: 5 days     Minutes of Exercise per Session: 60 min   Stress: No Stress Concern Present     Feeling of Stress : Not at all   Social Connections: Socially Integrated     Frequency of Communication with Friends and Family: More than three times a week     Frequency of Social Gatherings with Friends and Family: Once a week     Attends Congregational Services: 1 to 4 times per year     Active Member of Clubs or Organizations: Yes     Attends Club or Organization Meetings: Not on file     Marital Status:    Intimate Partner Violence: Not on file   Housing Stability: Low Risk      Unable to Pay for Housing in the Last Year: No     Number of Places Lived in the Last Year: 1     Unstable Housing in the Last Year: No       Family History   Adopted: Yes   Problem Relation Age of Onset     Diabetes Mother      Anesthesia Reaction No family hx of        Objective:     No vitals or physical exam obtained due to virtual visit    LABS  Most Recent 3 CBC's:  Recent Labs   Lab Test 05/09/22 2308 11/09/21  0923 09/14/21  1324   WBC 8.5 6.3 5.8   HGB 15.1 14.7 14.4   MCV 87 89 86    196 217     Most Recent 3 BMP's:  Recent Labs   Lab Test 05/09/22 2308 11/09/21  0923 09/14/21  1324    144 140   POTASSIUM 3.8 4.4 4.2   CHLORIDE 105 108* 102   CO2 26 27 28   BUN 20 13 22   CR 1.06 0.87 0.97   ANIONGAP 10 9 10   SENIA 9.3 9.7 10.3   GLC 95 94 91     Most Recent 6 Bacteria  Isolates From Any Culture (See EPIC Reports for Culture Details):No lab results found.  Most Recent Urinalysis:  Recent Labs   Lab Test 05/09/22 2012 11/23/21  1017   COLOR Yellow Yellow   APPEARANCE Turbid* Clear   URINEGLC Negative Negative   URINEBILI Negative Negative   URINEKETONE Negative Negative   SG 1.024 >=1.030   UBLD >1.0 mg/dL* Negative   URINEPH 5.5 5.5   PROTEIN 20 * Negative   UROBILINOGEN  --  0.2   NITRITE Negative Negative   LEUKEST 25 Mery/uL* Negative   RBCU >182*  --    WBCU 21*  --      Acute Labs   Urine Culture    Culture   Date Value Ref Range Status   05/09/2022 <10,000 CFU/mL Mixture of urogenital pennie  Final   12/03/2020 No Growth  Final   07/08/2020 STAPHYLOCOCCUS AUREUS (A)  Final     Comment:     4+ Staphylococcus aureus

## 2022-05-13 NOTE — PATIENT INSTRUCTIONS
Patient Stated Goal: Pass my stone  Symptom Control While Passing A Stone    The goal of [facility] is to let a smaller kidney stone (less than 4 to 5 mm) pass without intervention if possible. Giving your body a chance to clear the stone may take a few hours up to a few weeks.  Keeping you well-informed, safe and fairly comfortable is important.    Drink to thirst  Do not attempt to  flush out  your stone by drinking too much fluid. This does not work and may increase nausea. Drink enough to satisfy your body s thirst. Eating your normal diet is fine.   Medications (that may be suggested or prescribed)    Acetaminophen (Advil or Motrin) Available over the counter  Take two (500mg) tablets every six hours as needed  Prevents spasm of the ureter.    Decreases pain.    Dramamine* (drowsy version, non-generic formulation) Available over the counter  Take 50mg at bedtime  Decreases spasms of the ureter  Decreases nausea  Decreases acute pain  Decreases recurrence of pain for 24 hours  Will help you sleep  *This medication will cause increase drowsiness, do not drive or operate machinery for 6 hours.    Narcotics (Tramadol) Take as prescribed for severe pain unrelieved by ibuprofen and Dramamine  Narcotics have significant side effects and only  cover-up  pain. They have no effect on the cause of pain.  Common side effects  Confusion, disorientation and sedation - DO NOT DRIVE OR OPERATE MACHINERY WITHIN 24 HOURS  Nausea - take Dramamine or Zofran or Haldol to help control  Constipation  Sleep disturbances    Ondansetron (Zofran) Take as prescribed  Reserve for severe nausea  May cause constipation, start over the counter Miralax if needed    Second Line Anti-Nausea Medication: Adding a different anti-nausea medication maybe helpful for persistent nausea.  The combined effect of different types of anti-nausea medications maybe more effective than either medication by itself, even in higher  doses.  Compazine: Take as prescribed    Information about kidney stones  Crystals can form if chemicals are too concentrated in your urine. If the crystal grows over time, a stone may form. A stone usually isn t painful while it is still in the kidney.  As the stone begins to leave the kidney, you may experience episodes of flank pain as the kidney stone approaches the entrance to the ureter. Some people feel a vague ache in the side.  Kidney stones may fall into the ureter. Some stones are tiny and pass through without causing symptoms. The ureter is a small tube (approximately 1/8 of an inch wide). A kidney stone can get stuck and block the ureter. If this happens, urine backs up and flows back to the kidney. Back pressure on the kidney can cause:  Severe flank pain radiating to the groin.  Severe nausea and vomiting.  The pain can occur in the lower back, side, groin or all three.    When the stone reaches the lower ureter, this can irritate the bladder and sensations of feeling the urge to urinate frequently and urgently may occur.    Once the stone passes out of your ureter and into your bladder, the symptoms of urgency and frequency will often disappear. Sometimes pain will come back for a short period and will not be as severe as before. The passage of the stone from your bladder and out of your body is usually not a problem. The urethra is at least twice as wide as the normal ureter, so the stone doesn t usually block it.    Strain all urine  If you pass the stone, save it. Place it in the container we have provided and bring it to the [facility] within a week of passing it. Your stone will then be sent for analysis which takes about a month.     Signs and symptoms you might experience  Nausea  Decreased appetite  Urinary frequency  Bloody urine   Chills  Fatigue    When to call [facility] or go to the Emergency Room  Fever with a temperature greater than 100.1  Severe  pain  Persistent nausea/vomiting    If the pain worsens or nausea/vomiting is uncontrolled with medications, STOP eating & drinking. You need to have an empty stomach for 8 hours prior to surgery. Call the [facility] [place] [facility] immediately at [phone].           Follow-up  Low dose CT scan with doctor visit 1-2 weeks after initial clinic visit per doctor s instructions    Please cancel the CT scan visit if you pass a stone. Reschedule for a one month follow-up with doctor to discuss stone composition and future prevention.    Preventing future stones    Approximately a month after your stone is sent out for analysis, a prevention visit will occur with your provider, to discuss an individualized plan for prevention of new stones and to discuss managing stones that you may still have. Along with the analysis of the kidney stone, blood and urine tests may be indicated to develop this plan. Knowing the type of kidney stones you make, and why, allows the providers at the [facility] to recommend specific ways to prevent them.    Follow-up visits are an important part of monitoring and preventing future re-occurrences.    The [facility] is available for questions or concerns 24 hours a day at [phone]

## 2022-05-13 NOTE — PROGRESS NOTES
Patient states that they are in [place] at the time of their visit.  Patient is aware telehealth visit is billable and agrees to proceed.  Marry Palmer RN

## 2022-05-16 ENCOUNTER — TELEPHONE (OUTPATIENT)
Dept: UROLOGY | Facility: CLINIC | Age: 62
End: 2022-05-16
Payer: COMMERCIAL

## 2022-05-17 ENCOUNTER — MYC MEDICAL ADVICE (OUTPATIENT)
Dept: SURGERY | Facility: CLINIC | Age: 62
End: 2022-05-17
Payer: COMMERCIAL

## 2022-05-17 ENCOUNTER — TELEPHONE (OUTPATIENT)
Dept: UROLOGY | Facility: CLINIC | Age: 62
End: 2022-05-17
Payer: COMMERCIAL

## 2022-05-17 DIAGNOSIS — E66.01 MORBID OBESITY (H): ICD-10-CM

## 2022-05-17 DIAGNOSIS — K91.2 POSTOPERATIVE MALABSORPTION: ICD-10-CM

## 2022-05-17 DIAGNOSIS — Z90.3 HISTORY OF SLEEVE GASTRECTOMY: Primary | ICD-10-CM

## 2022-05-17 NOTE — TELEPHONE ENCOUNTER
Zaina Cerda, RD sent to  [facility]  Internal 1 year post-op Sleeve labs needed for appointment with Irvin Branch MD on Friday, June 17th at 8:15 am.     12 mos post op lab orders placed for patient in preparation for appointment with  in June. CBC and CMP not ordered d/t just having done this month.    Hannah Davis RN, CBN  [facility]  P [phone]  F [phone]

## 2022-05-20 ENCOUNTER — TELEPHONE (OUTPATIENT)
Dept: UROLOGY | Facility: CLINIC | Age: 62
End: 2022-05-20
Payer: COMMERCIAL

## 2022-05-25 ENCOUNTER — TELEPHONE (OUTPATIENT)
Dept: UROLOGY | Facility: CLINIC | Age: 62
End: 2022-05-25
Payer: COMMERCIAL

## 2022-05-26 ENCOUNTER — TELEPHONE (OUTPATIENT)
Dept: UROLOGY | Facility: CLINIC | Age: 62
End: 2022-05-26
Payer: COMMERCIAL

## 2022-06-17 ENCOUNTER — OFFICE VISIT (OUTPATIENT)
Dept: SURGERY | Facility: CLINIC | Age: 62
End: 2022-06-17
Payer: COMMERCIAL

## 2022-06-17 ENCOUNTER — LAB (OUTPATIENT)
Dept: LAB | Facility: CLINIC | Age: 62
End: 2022-06-17

## 2022-06-17 VITALS
WEIGHT: 269 LBS | BODY MASS INDEX: 40.77 KG/M2 | SYSTOLIC BLOOD PRESSURE: 130 MMHG | HEIGHT: 68 IN | DIASTOLIC BLOOD PRESSURE: 84 MMHG

## 2022-06-17 DIAGNOSIS — K91.2 POSTOPERATIVE MALABSORPTION: ICD-10-CM

## 2022-06-17 DIAGNOSIS — Z90.3 HISTORY OF SLEEVE GASTRECTOMY: ICD-10-CM

## 2022-06-17 DIAGNOSIS — E66.01 MORBID OBESITY (H): ICD-10-CM

## 2022-06-17 DIAGNOSIS — K91.2 POSTOPERATIVE MALABSORPTION: Primary | ICD-10-CM

## 2022-06-17 LAB
FERRITIN SERPL-MCNC: 55 NG/ML (ref 27–300)
FOLATE SERPL-MCNC: 8.4 NG/ML
HBA1C MFR BLD: 5.3 % (ref 0–5.6)
PTH-INTACT SERPL-MCNC: 36 PG/ML (ref 10–86)
VIT B12 SERPL-MCNC: 356 PG/ML (ref 213–816)

## 2022-06-17 PROCEDURE — 82728 ASSAY OF FERRITIN: CPT

## 2022-06-17 PROCEDURE — 84425 ASSAY OF VITAMIN B-1: CPT | Mod: 90

## 2022-06-17 PROCEDURE — 99214 OFFICE O/P EST MOD 30 MIN: CPT | Performed by: EMERGENCY MEDICINE

## 2022-06-17 PROCEDURE — 84630 ASSAY OF ZINC: CPT | Mod: 90

## 2022-06-17 PROCEDURE — 36415 COLL VENOUS BLD VENIPUNCTURE: CPT

## 2022-06-17 PROCEDURE — 83036 HEMOGLOBIN GLYCOSYLATED A1C: CPT

## 2022-06-17 PROCEDURE — 84590 ASSAY OF VITAMIN A: CPT | Mod: 90

## 2022-06-17 PROCEDURE — 82746 ASSAY OF FOLIC ACID SERUM: CPT

## 2022-06-17 PROCEDURE — 99000 SPECIMEN HANDLING OFFICE-LAB: CPT

## 2022-06-17 PROCEDURE — 82607 VITAMIN B-12: CPT

## 2022-06-17 PROCEDURE — 82306 VITAMIN D 25 HYDROXY: CPT

## 2022-06-17 PROCEDURE — 83970 ASSAY OF PARATHORMONE: CPT

## 2022-06-17 NOTE — PATIENT INSTRUCTIONS
Plan:  1. Great work thus far with 82 lb reduction from introductory weight last year.   2. Hydrate well with 70-85 oz of water daily.   3. Check labs today and I'll message results once all tests are back.  4. Restart multivitamin dosing or consider [place] Life Just one a day.  5. Continue active life and aim to redevelop some strength this year based on one rep maximum     [facility]  Nutritional Guidelines  Gastric Sleeve 12 Months Post Op    General Guidelines and Helpful Hints:  Eat 3 meals per day + protein supplement(s). No snacks between meals.  Do not skip meals.  This can cause overeating at the next meal and will prevent adequate protein and nutritional intake.  Aim for 60-80 grams of protein per day.  Always eat your protein first. This assists with optimal nutrition and helps you stay full longer.  Depending on your portion size, you may need to drink approved protein supplement between meals to achieve protein goals. Follow recommendations of your Dietitian.   Eat your protein first, and then follow with fiber.   It is not necessary to count your fiber, but 15-20 grams per day is recommended.    Add fiber by including fruits, vegetables, whole grains, and beans.   Portions should be about 1 cup per meal. Use measuring cups to be accurate.  Continue to use saucer/salad plates, infant/toddler silverware to keep portion sizes small and take small bites.  Eat S-L-O-W-L-Y to make each meal last 20-30 minutes. Always stop eating when satisfied.  Continue to use caution with foods containing skins, peels or membranes. Chew well!  Aim for 64 oz. of calorie-free fluids daily.  Continue to avoid caffeine and carbonation. If you choose to drink alcohol, do so in moderation.   Remember to avoid drinking during meals, 15-30 minutes before and 30 minutes after.  Exercise is waller for continued weight loss and weight maintenance. Aim for 30-60 minutes of physical activity most days of the week. Include  cardiovascular and strength training.  If having trouble tolerating meat, try using a crock-pot, tinfoil tent, steamer or other moist cooking method to create tender meats. Add broth or low-fat gravy to help meat stay moist.   Avoid high sugar and high fat foods to prevent high calorie intake. This will reduce your rate of weight loss and can cause weight regain.   Check nutrition labels for less than 10 grams of sugar and less than 10 grams of fat per serving.  Continue Taking Vitamins/Minerals:  5656-2496 mcg of Sublingual B-12 daily  1 Multivitamin with Iron twice daily (chewable or swallow tabs)  500-600 mg Calcium Citrate twice daily (chewable or swallow tabs)  5000 IU Vitamin D3 daily    Sample Grocery List    Protein:  Fat free Greek or light yogurt (less than 10 grams sugar)  Fat free or low-fat cottage cheese  String cheese or reduced fat cheese slices  Tuna, salmon, crab, egg, or chicken salad made with light or fat free mayonnaise  Egg or Egg Substitute  Lean/extra lean turkey, beef, bison, venison (ground, sirloin, round, flank)  Pork loin or tenderloin (grilled, baked, broiled)  Fish such as salmon, tuna, trout, tilapia, etc. (grilled, baked, broiled)  Tender cuts of lean (skinless) turkey or chicken  Lean deli meats: turkey, lean ham, chicken, lean roast beef  Beans such as kidney, garbanzo, black, bustos, or low-fat/fat free refried beans  Peanut butter (natural preferred). Limit to 1 Tbsp. per day.  Low-fat meatloaf (made with lean ground beef or turkey)  Sloppy Joes made with low-sugar ketchup and lean ground beef or turkey  Soy or vegetable protein (i.e. vegan crumbles, soy/veggie burger, tofu)  Hummus    Vegetables:  Fresh: cooked or raw (as tolerated)  Frozen vegetables  Canned vegetables (low sodium or no salt added, rinse before cooking/eating)  (Ok to have skins/peels/membranes/seeds - just chew well)    Fruits:  Fresh fruit  Frozen fruit (no sugar added)  Canned fruit (packed in its own juice,  NOT syrup)  (Ok to have skins/peels/membranes/seeds - just chew well)    Starch:  Unsweetened whole-grain hot cereal (or high fiber cold cereal, dry)  Toasted whole wheat bread or [place] Thins  Whole grain crackers  Baked/boiled/mashed potato/sweet potato  Cooked whole grain pasta, brown rice, or other cooked whole grains  Starchy vegetables: corn, peas, winter squash    Protein Supplement:   Ready to drink protein shake with:  15-30 grams protein per serving  Less than 10 grams total carbohydrate per serving   Protein powder mixed with:   Skim or 1% milk  Low fat or fat free Lactaid milk, plain or no sugar added soymilk  Water     Fats: (use in moderation)  1 teaspoon of soft tub margarine  1 teaspoon olive oil, canola oil, or peanut oil  1 tablespoon of low-fat [place] or salad dressing     Sample Menu for 12 months after Gastric Sleeve    You do NOT need to eat/drink the full portion sizes listed below  Always stop when you are satisfied    Breakfast   cup 1% cottage cheese     cup diced peaches   Lunch   slice whole grain bread/toast with 1 tsp. light [place]  2 oz. sliced lean turkey, ham, or chicken    cup carrots   Supplement Approved protein supplement (as needed between meals)   Dinner   cup 93% lean ground beef mixed with 2 Tbsp. marinara sauce     cup green beans    cup whole grain pasta     Breakfast   cup egg substitute or 2 egg whites, scrambled   1 oz low fat shredded cheese    cup sautéed chopped vegetables mixed in   Lunch 1 cup chili made with lean ground beef or turkey   Supplement 6 oz light Greek yogurt (as needed)   Dinner 3 oz  grilled, broiled, or baked lemon pepper salmon  2 Tbsp. grilled asparagus  2 Tbsp. baked sweet potato     Breakfast   cup whole grain oatmeal made with skim milk and unflavored protein powder added    cup blueberries       Lunch 3 oz  meatloaf made with lean ground turkey    cup steamed broccoli   Dinner 3 oz pork loin made in a crock pot seasoned with a spice rub    cup  cooked carrots   Supplement Approved protein supplement (as needed between meals)     Breakfast   cup egg scramble made with egg substitute and turkey sausage    whole grain English muffin with 1 teaspoon low sugar jelly   Lunch 3 oz seasoned, skinless grilled chicken     cup grilled vegetables   Dinner 2 oz lean beef    cup brown rice    cup strawberries   Supplement 1 string cheese (as needed)     Breakfast 6 ounces light Greek yogurt    cup unsweetened mixed berries   Lunch 3 oz shrimp, with low-sugar cocktail sauce for dipping    cup pea pods   Supplement   cup fat free cottage cheese (as needed)   Dinner 3 oz tender turkey breast (made in crock pot for extra moisture)    of a small whole wheat dinner roll     Breakfast     cup low fat cottage cheese    cup strawberries   Lunch   cup black bean soup  4 whole grain crackers   Supplement 1 cup skim milk with scoop of protein powder added (as needed)   Dinner 3 oz. grilled tilapia with lemon pepper seasoning    cup grilled bell peppers     Breakfast 2 ounces turkey sausage omkar    whole wheat English muffin   Supplement Approved protein supplement (as needed between meals)   Lunch 3 oz lean ham, turkey, or chicken     cup tomatoes   Dinner 2 oz. sirloin steak    cup mixed vegetables    cup brown rice          LEAN PROTEIN SOURCES  Getting 20-30 grams of protein, 3 meals daily, is appropriate for most people, some need more but more than about 40 grams per meal is not useful.  General rule is drinking one ounce of water per gram of protein eaten over the course of the day:  70 grams of protein each day, drink 70 oz of water.  Protein Source Portion Calories Grams of Protein                           Nonfat, plain Greek yogurt    (10 grams sugar or less) 3/4 cup (6 oz)  12-17   Light Yogurt (10 grams sugar or less) 3/4 cup (6 oz)  6-8   Protein Shake 1 shake 110-180 15-30   Skim/1% Milk or lactose-free milk 1 cup ( 8 oz)  8   Plain or light,  flavored soymilk 1 cup  7-8   Plain or light, hemp milk 1 cup 110 6   Fat Free or 1% Cottage Cheese 1/2 cup 90 15   Part skim ricotta cheese 1/2 cup 100 14   Part skim or reduced fat cheese slices 1 ounce 65-80 8     Mozzarella String Cheese 1 80 8   Canned tuna, chicken, crab or salmon  (canned in water)  1/2 cup 100 15-20   White fish (broiled, grilled, baked) 3 ounces 100 21   [place]/Tuna (broiled, grilled, baked) 3 ounces 150-180 21   Shrimp, Scallops, Lobster, Crab 3 ounces 100 21   Pork loin, Pork Tenderloin 3 ounces 150 21   Boneless, skinless chicken /turkey breast                          (broiled, grilled, baked) 3 ounces 120 21   [place], [place], [place], and Venison 3 ounces 120 21   Lean cuts of red meat and pork (sirloin,   round, tenderloin, flank, ground 93%-96%) 3 ounces 170 21   Lean or Extra Lean Ground Turkey 1/2 cup 150 20   90-95% Lean [place] Burger 1 omkar 140-180 21   Low-fat casserole with lean meat 3/4 cup 200 17   Luncheon Meats                                                        (turkey, lean ham, roast beef, chicken) 3 ounces 100 21   Egg (boiled, poached, scrambled) 1 Egg 60 7   Egg Substitute 1/2 cup 70 10   Nuts (limit to 1 serving per day)  3 Tbsp. 150 7   Nut [place] (peanut, almond)  Limit to 1 serving or less daily 1 Tbsp. 90 4   Soy Burger (varies) 1  15   Garbanzo, Black, [place] Beans 1/2 cup 110 7   Refried Beans 1/2 cup 100 7   Kidney and Lima beans 1/2 cup 110 7   Tempeh 3 oz 175 18   Vegan crumbles 1/2 cup 100 14   Tofu 1/2 cup 110 14   Chili (beans and extra lean beef or turkey) 1 cup 200 23   Lentil Stew/Soup 1 cup 150 12   Black Bean Soup 1 cup 175 12

## 2022-06-17 NOTE — PROGRESS NOTES
Bariatric Follow Up Visit with a History of Previous Bariatric Surgery     Date of visit: 6/17/2022  Physician: Irvin Branch MD, MD  Primary Care Provider:  [facility] [place]  Nick Coley   62 year old  male    Date of Surgery: 6/15/21  Initial Weight: 351 lbs  Initial BMI: 54.3  Today's Weight:   Wt Readings from Last 1 Encounters:   06/17/22 122 kg (269 lb)     Weight history:   Wt Readings from Last 4 Encounters:   06/17/22 122 kg (269 lb)   05/09/22 122.5 kg (270 lb)   05/02/22 123 kg (271 lb 1.6 oz)   12/17/21 126.1 kg (278 lb)      Body mass index is 40.9 kg/m .      Assessment and Plan     Assessment: Nick is a 62 year old year old male who is one year s/p  Sleeve Gastrectomy with Dr. Olmos. We'll check vitamin regimen today, he's been off multivitamin for unclear reasons and with kidney stone went off calcium last month.    Nick Coley feels as if he has achieved the goals he hoped to accomplish through bariatric surgery and weight loss.    Encounter Diagnosis   Name Primary?     Postoperative malabsorption Yes         Current Outpatient Medications:      calcium citrate-vitamin D (CITRACAL) 315-200 MG-UNIT TABS per tablet, Take 2 tablets by mouth daily, Disp: , Rfl:      Cholecalciferol 125 MCG (5000 UT) TABS, Take 125 mcg by mouth daily, Disp: , Rfl:      citalopram (CELEXA) 20 MG tablet, Take 1 tablet (20 mg) by mouth daily, Disp: 90 tablet, Rfl: 3     cyanocobalamin (CYANOCOBALAMIN) 1000 MCG SUBL sublingual tablet, Place 1,000 mcg under the tongue daily, Disp: , Rfl:      lisinopril (ZESTRIL) 10 MG tablet, Take 1 tablet (10 mg) by mouth daily, Disp: 90 tablet, Rfl: 3     lisinopril (ZESTRIL) 20 MG tablet, Take 20 mg by mouth daily, Disp: , Rfl:      rosuvastatin (CRESTOR) 10 MG tablet, Take 1 tablet (10 mg) by mouth daily, Disp: 90 tablet, Rfl: 3     tiZANidine (ZANAFLEX) 2 MG tablet, Take 1 tablet (2 mg) by mouth 3 times daily as needed (back pain), Disp: 30 tablet, Rfl: 0     UNABLE TO FIND,  MEDICATION NAME: Men One A Day Multivitamin, Disp: , Rfl:     Plan:  1. Great work thus far with 82 lb reduction from introductory weight last year.   2. Hydrate well with 70-85 oz of water daily.   3. Check labs today and I'll message results once all tests are back.  4. Restart multivitamin dosing or consider [place] Life Just one a day.  5. Continue active life and aim to redevelop some strength this year based on one rep maximum   Return in about 6 months (around 12/17/2022) for Follow up, with me.    Bariatric Surgery Review     Interim History/LifeChanges: has had some kidney stone flare up for the 6th time in his life, COVID this spring. Medrol dose pack passed 2 stones. Down 82 lbs from preop weight.    Patient Concerns: review today.  Appetite (1-10): good  GERD: none.    Reviewed whether any need/indication for screening EGD today and we will be deferred.  Typically, a screening EGD is recommend post op year 2-3 if no symptoms to assess health of esophagus/bariatric surgery and sooner if difficult to control GERD or persistent pain/dysphagia sx despite behavior modification.    Medication changes:   Hydration focus lately.  Vitamin Intake:   B-12   daily 1000mcg.   MVI  not regularly   Vitamin D  125mcg   Calcium   off since kidney stone.     Other                LABS: ordered  EXAM: CT ABDOMEN PELVIS W/O CONTRAST  LOCATION: [facility]  DATE/TIME: 5/9/2022 11:26 PM     INDICATION: Flank pain, kidney stone suspected  COMPARISON: 12/04/2020  TECHNIQUE: CT scan of the abdomen and pelvis was performed without IV contrast. Multiplanar reformats were obtained. Dose reduction techniques were used.  CONTRAST: None.     FINDINGS:   LOWER CHEST: Lung bases clear.     HEPATOBILIARY: Normal.     PANCREAS: Normal.     SPLEEN: Normal.     ADRENAL GLANDS: Normal.     KIDNEYS/BLADDER: Punctate renal calculi. Mild right hydronephrosis. Right perinephric stranding. Right ureteral dilatation. 2 calculi  at the right UVJ. The larger 5 mm.     BOWEL: Normal caliber. Normal appendix. Postsurgical change of the stomach.     LYMPH NODES: Normal.     VASCULATURE: Atherosclerotic vascular calcification.     PELVIC ORGANS: Prostate calcification.     MUSCULOSKELETAL: Degenerative change osseous structures. Atrophic right iliopsoas musculature.                                                                      IMPRESSION:   1.  2 calculi at the right UVJ, the larger 5 mm. Mild right hydronephrosis.  2.  Punctate renal calculi.     Nausea no  Vomiting no  Constipation no  Diarrhea no  Rashes no  Hair Loss no  Reactive Hypoglycemia n/a  Light Headedness n/a   Moods on celexa        Most recent labs:  Lab Results   Component Value Date    WBC 8.5 05/09/2022    HGB 15.1 05/09/2022    HCT 45.5 05/09/2022    MCV 87 05/09/2022     05/09/2022     Lab Results   Component Value Date    CHOL 186 11/09/2021     Lab Results   Component Value Date    HDL 43 11/09/2021     No components found for: LDLCALC  Lab Results   Component Value Date    TRIG 128 11/09/2021     No results found for: CHOLHDL  Lab Results   Component Value Date    ALT 16 11/09/2021    AST 17 11/09/2021    ALKPHOS 65 11/09/2021     No results found for: HGBA1C  No components found for: PVXPPOQI27  No components found for: RSDKNZJJ75HI  No components found for: FERRITIN  No components found for: PTH  No components found for: 27527  No components found for: 7597  Lab Results   Component Value Date    TSH 0.98 09/25/2020     No results found for: TESTOSTERONE    Habits:  Tobacco/Nicotine/THC exposure? no   NSAID use? no   Alcohol use? no   Caffeine Habits? 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 Will work up w/ son at [employer] up until kidney stone.  3 meals/day? yes  Protein 60-80g/day? yes  Water Separate from meals? yes  Calorie Containing Beverages: n/a  Restaurant eating/wk: limited. Using protein shakes periodically  "still. Smoothies.  Sleep Habits:  Good. Shift work.   CPAP Use? n/a  Contraception: n/a  DEXA:n/a.  Discussed annual screening to start at age 45 and continue to age 55 if scoring \"low risk\". DEXA scan recommended at age 55 regardless as long as at least 2 years have transpired from their bariatric surgery.    Social History     Social History     Socioeconomic History     Marital status:      Spouse name: Not on file     Number of children: Not on file     Years of education: Not on file     Highest education level: Not on file   Occupational History     Not on file   Tobacco Use     Smoking status: Never Smoker     Smokeless tobacco: Never Used     Tobacco comment: cigar once or twice a year   Substance and Sexual Activity     Alcohol use: Not Currently     Comment: Alcoholic Drinks/day: just during softball season or vacation-0-2     Drug use: No     Sexual activity: Yes     Partners: Female   Other Topics Concern     Not on file   Social History Narrative    , 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      Social Determinants of Health     Financial Resource Strain: Low Risk      Difficulty of Paying Living Expenses: Not hard at all   Food Insecurity: No Food Insecurity     Worried About Running Out of Food in the Last Year: Never true     Ran Out of Food in the Last Year: Never true   Transportation Needs: No Transportation Needs     Lack of Transportation (Medical): No     Lack of Transportation (Non-Medical): No   Physical Activity: Sufficiently Active     Days of Exercise per Week: 5 days     Minutes of Exercise per Session: 60 min   Stress: No Stress Concern Present     Feeling of Stress : Not at all   Social Connections: Socially Integrated     Frequency of Communication with Friends and Family: More than three times a week     Frequency of Social Gatherings with Friends and Family: Once a week     Attends Spiritism Services: 1 to 4 times per year     Active Member of Clubs or Organizations: Yes     Attends Club " or Organization Meetings: Not on file     Marital Status:    Intimate Partner Violence: Not on file   Housing Stability: Low Risk      Unable to Pay for Housing in the Last Year: No     Number of Places Lived in the Last Year: 1     Unstable Housing in the Last Year: No       Past Medical History     Past Medical History:   Diagnosis Date     Abnormal LFTs (liver function tests) 12/18/2015    Negative hepatitis C antibody and ferritin of 330 6 December 2015, steatohepatitis suspected     Acute bilateral low back pain without sciatica 5/2/2022     Acute blood loss anemia 03/18/2021     WHIT (acute kidney injury) (H) 03/18/2021     Benign essential hypertension      Cellulitis of left lower extremity 05/29/2020     Cellulitis, face 2014     Chronic kidney disease      Chronic tension-type headache, not intractable 06/10/2019     Depression with anxiety      Essential hypertension 12/18/2015     Fatty liver     ultrasound 2001 demonstrated.     Hematoma of leg, left, subsequent encounter 07/23/2018     History of colon polyps     Colonoscopy November 2018 with multiple polyps removed, repeat in 3 years     History of gastric bypass     Normal sleeve gastrectomy June 2021     Hyperlipidemia      Irritable bowel syndrome      Kidney stones     x 4     LBP (low back pain)      Microscopic hematuria 07/23/2018     Moderate major depression (H) 06/10/2019     Morbid obesity (H) 12/18/2015    Normal sleeve gastrectomy June 2021     Nephrolithiasis 12/2020    Right     Nephrolithiasis     2013, 2009, 2001.  Right percutaneous nephrolithotomy March 2021.  Stone analysis 90% uric acid and 10% calcium oxalate     Numbness of left anterior thigh 11/9/2021    Numbness and burning left anterior thigh above his knee     ANAM (obstructive sleep apnea) 01/2021    Severe     Sleep apnea     uses cpap     Suspected sleep apnea 11/16/2018     Tear of meniscus of right knee 12/18/2015    Associated with right tibial plateau fracture  "2013, arthroscopic knee surgery December 2015     Vitamin D deficiency     Vitamin D 10.0 November 2018     Wrist fracture      Problem List     Patient Active Problem List   Diagnosis     Snoring     Shifting sleep-work schedule     ANAM (obstructive sleep apnea)     Abnormal liver function     Calculus of kidney     Chronic tension-type headache, not intractable     Essential hypertension     History of colon polyps     Hyperlipidemia     Irritable bowel syndrome     Low back pain     Microscopic hematuria     Vitamin D deficiency     Nephrolithiasis     Moderate major depression (H)     Fatty liver     Morbid obesity (H)     History of gastric bypass     Numbness of left anterior thigh     Acute bilateral low back pain without sciatica     Medications     [unfilled]  Surgical History     Past Surgical History  He has a past surgical history that includes compound fracture (Right); carpal tunnel release rt/lt (Right, 2001); Release carpal tunnel (Right, 2001); Extracorporeal Shock Wave Lithotripsy (2013); KNEE SCOPE,MED-LAT MENISECTOMY (Right, 12/22/2015); [place] Tooth Extraction (2013); Kidney Stone Surgery (Right); other surgical history; and Pr Lap, [place]ias Restrict Proc, Longitudinal Gastrectomy (N/A, 6/15/2021).    Objective-Exam     Constitutional:  /84   Ht 1.727 m (5' 8\")   Wt 122 kg (269 lb)   BMI 40.90 kg/m    [unfilled]   General:  Pleasant and in no acute distress   Eyes:  EOMI  ENT:  Airway patent.     Neck:  Respiratory: Normal respiratory effort, no cough, .  CV:  RRR  Gastrointestinal: nontender, no cva tenderness to percussion posteriorly.  Musculoskeletal: muscle mass WNL  Skin: color no pallor hair mild thinning,   Psychiatric: alert and oriented X3, mood and affect normal    Counseling     We reviewed the important post op bariatric recommendations:  -eating 3 meals daily  -eating protein first, getting >60gm protein daily  -eating slowly, chewing food well  -avoiding/limiting " calorie containing beverages  -drinking water 15-30 minutes before or after meals  -limiting restaurant or cafeteria eating to twice a week or less    We discussed the importance of restorative sleep and stress management in maintaining a healthy weight.  We discussed the National Weight Control Registry healthy weight maintenance strategies and ways to optimize metabolism.  We discussed the importance of physical activity including cardiovascular and strength training in maintaining a healthier weight.    We discussed the importance of life-long vitamin supplementation and life-long  follow-up.    Nick was reminded that, to avoid marginal ulcers he should avoid tobacco at all, alcohol in excess, caffeine in excess, and NSAIDS (unless indicated for cardioprotection or othewise and opposed by a PPI).    Irvin Branch MD    [facility].  2022  3:18 PM  [phone] (clinic phone)  [phone] (fax)    No images are attached to the encounter.  Medical Decision Makin minutes spent on the date of the encounter doing chart review, history and exam, documentation and further activities per the note

## 2022-06-17 NOTE — LETTER
6/17/2022         RE: Nick Coley  [address] [place] [address]        Dear Colleague,    Thank you for referring your patient, Nick Coley, to the [facility] [place]. Please see a copy of my visit note below.    Bariatric Follow Up Visit with a History of Previous Bariatric Surgery     Date of visit: 6/17/2022  Physician: Irvin Branch MD, MD  Primary Care Provider:  [facility] [place]  Nick Coley   62 year old  male    Date of Surgery: 6/15/21  Initial Weight: 351 lbs  Initial BMI: 54.3  Today's Weight:   Wt Readings from Last 1 Encounters:   06/17/22 122 kg (269 lb)     Weight history:   Wt Readings from Last 4 Encounters:   06/17/22 122 kg (269 lb)   05/09/22 122.5 kg (270 lb)   05/02/22 123 kg (271 lb 1.6 oz)   12/17/21 126.1 kg (278 lb)      Body mass index is 40.9 kg/m .      Assessment and Plan     Assessment: Nick is a 62 year old year old male who is one year s/p  Sleeve Gastrectomy with Dr. Olmos. We'll check vitamin regimen today, he's been off multivitamin for unclear reasons and with kidney stone went off calcium last month.    Nick Coley feels as if he has achieved the goals he hoped to accomplish through bariatric surgery and weight loss.    Encounter Diagnosis   Name Primary?     Postoperative malabsorption Yes         Current Outpatient Medications:      calcium citrate-vitamin D (CITRACAL) 315-200 MG-UNIT TABS per tablet, Take 2 tablets by mouth daily, Disp: , Rfl:      Cholecalciferol 125 MCG (5000 UT) TABS, Take 125 mcg by mouth daily, Disp: , Rfl:      citalopram (CELEXA) 20 MG tablet, Take 1 tablet (20 mg) by mouth daily, Disp: 90 tablet, Rfl: 3     cyanocobalamin (CYANOCOBALAMIN) 1000 MCG SUBL sublingual tablet, Place 1,000 mcg under the tongue daily, Disp: , Rfl:      lisinopril (ZESTRIL) 10 MG tablet, Take 1 tablet (10 mg) by mouth daily, Disp: 90 tablet, Rfl: 3     lisinopril (ZESTRIL) 20 MG tablet, Take 20 mg by mouth daily,  Disp: , Rfl:      rosuvastatin (CRESTOR) 10 MG tablet, Take 1 tablet (10 mg) by mouth daily, Disp: 90 tablet, Rfl: 3     tiZANidine (ZANAFLEX) 2 MG tablet, Take 1 tablet (2 mg) by mouth 3 times daily as needed (back pain), Disp: 30 tablet, Rfl: 0     UNABLE TO FIND, MEDICATION NAME: Men One A Day Multivitamin, Disp: , Rfl:     Plan:  1. Great work thus far with 82 lb reduction from introductory weight last year.   2. Hydrate well with 70-85 oz of water daily.   3. Check labs today and I'll message results once all tests are back.  4. Restart multivitamin dosing or consider [employer] Just one a day.  5. Continue active life and aim to redevelop some strength this year based on one rep maximum   Return in about 6 months (around 12/17/2022) for Follow up, with me.    Bariatric Surgery Review     Interim History/LifeChanges: has had some kidney stone flare up for the 6th time in his life, COVID this spring. Medrol dose pack passed 2 stones. Down 82 lbs from preop weight.    Patient Concerns: review today.  Appetite (1-10): good  GERD: none.    Reviewed whether any need/indication for screening EGD today and we will be deferred.  Typically, a screening EGD is recommend post op year 2-3 if no symptoms to assess health of esophagus/bariatric surgery and sooner if difficult to control GERD or persistent pain/dysphagia sx despite behavior modification.    Medication changes:   Hydration focus lately.  Vitamin Intake:   B-12   daily 1000mcg.   MVI  not regularly   Vitamin D  125mcg   Calcium   off since kidney stone.     Other                LABS: ordered  EXAM: CT ABDOMEN PELVIS W/O CONTRAST  LOCATION: [facility]  DATE/TIME: 5/9/2022 11:26 PM     INDICATION: Flank pain, kidney stone suspected  COMPARISON: 12/04/2020  TECHNIQUE: CT scan of the abdomen and pelvis was performed without IV contrast. Multiplanar reformats were obtained. Dose reduction techniques were used.  CONTRAST: None.     FINDINGS:    LOWER CHEST: Lung bases clear.     HEPATOBILIARY: Normal.     PANCREAS: Normal.     SPLEEN: Normal.     ADRENAL GLANDS: Normal.     KIDNEYS/BLADDER: Punctate renal calculi. Mild right hydronephrosis. Right perinephric stranding. Right ureteral dilatation. 2 calculi at the right UVJ. The larger 5 mm.     BOWEL: Normal caliber. Normal appendix. Postsurgical change of the stomach.     LYMPH NODES: Normal.     VASCULATURE: Atherosclerotic vascular calcification.     PELVIC ORGANS: Prostate calcification.     MUSCULOSKELETAL: Degenerative change osseous structures. Atrophic right iliopsoas musculature.                                                                      IMPRESSION:   1.  2 calculi at the right UVJ, the larger 5 mm. Mild right hydronephrosis.  2.  Punctate renal calculi.     Nausea no  Vomiting no  Constipation no  Diarrhea no  Rashes no  Hair Loss no  Reactive Hypoglycemia n/a  Light Headedness n/a   Moods on celexa        Most recent labs:  Lab Results   Component Value Date    WBC 8.5 05/09/2022    HGB 15.1 05/09/2022    HCT 45.5 05/09/2022    MCV 87 05/09/2022     05/09/2022     Lab Results   Component Value Date    CHOL 186 11/09/2021     Lab Results   Component Value Date    HDL 43 11/09/2021     No components found for: LDLCALC  Lab Results   Component Value Date    TRIG 128 11/09/2021     No results found for: CHOLHDL  Lab Results   Component Value Date    ALT 16 11/09/2021    AST 17 11/09/2021    ALKPHOS 65 11/09/2021     No results found for: HGBA1C  No components found for: XCMAAHML64  No components found for: NVOLTQPS46QA  No components found for: FERRITIN  No components found for: PTH  No components found for: 20816  No components found for: 7597  Lab Results   Component Value Date    TSH 0.98 09/25/2020     No results found for: TESTOSTERONE    Habits:  Tobacco/Nicotine/THC exposure? no   NSAID use? no   Alcohol use? no   Caffeine Habits? no       Exercise Routine: security work.  "Walks a lot, rides bike at work. Will work up w/ son at [employer] up until kidney stone.  3 meals/day? yes  Protein 60-80g/day? yes  Water Separate from meals? yes  Calorie Containing Beverages: n/a  Restaurant eating/wk: limited. Using protein shakes periodically still. Smoothies.  Sleep Habits:  Good. Shift work.   CPAP Use? n/a  Contraception: n/a  DEXA:n/a.  Discussed annual screening to start at age 45 and continue to age 55 if scoring \"low risk\". DEXA scan recommended at age 55 regardless as long as at least 2 years have transpired from their bariatric surgery.    Social History     Social History     Socioeconomic History     Marital status:      Spouse name: Not on file     Number of children: Not on file     Years of education: Not on file     Highest education level: Not on file   Occupational History     Not on file   Tobacco Use     Smoking status: Never Smoker     Smokeless tobacco: Never Used     Tobacco comment: cigar once or twice a year   Substance and Sexual Activity     Alcohol use: Not Currently     Comment: Alcoholic Drinks/day: just during softball season or vacation-0-2     Drug use: No     Sexual activity: Yes     Partners: Female   Other Topics Concern     Not on file   Social History Narrative    , 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      Social Determinants of Health     Financial Resource Strain: Low Risk      Difficulty of Paying Living Expenses: Not hard at all   Food Insecurity: No Food Insecurity     Worried About Running Out of Food in the Last Year: Never true     Ran Out of Food in the Last Year: Never true   Transportation Needs: No Transportation Needs     Lack of Transportation (Medical): No     Lack of Transportation (Non-Medical): No   Physical Activity: Sufficiently Active     Days of Exercise per Week: 5 days     Minutes of Exercise per Session: 60 min   Stress: No Stress Concern Present     Feeling of Stress : Not at all   Social Connections: Socially Integrated     " Frequency of Communication with Friends and Family: More than three times a week     Frequency of Social Gatherings with Friends and Family: Once a week     Attends Jainism Services: 1 to 4 times per year     Active Member of Clubs or Organizations: Yes     Attends Club or Organization Meetings: Not on file     Marital Status:    Intimate Partner Violence: Not on file   Housing Stability: Low Risk      Unable to Pay for Housing in the Last Year: No     Number of Places Lived in the Last Year: 1     Unstable Housing in the Last Year: No       Past Medical History     Past Medical History:   Diagnosis Date     Abnormal LFTs (liver function tests) 12/18/2015    Negative hepatitis C antibody and ferritin of 330 6 December 2015, steatohepatitis suspected     Acute bilateral low back pain without sciatica 5/2/2022     Acute blood loss anemia 03/18/2021     WHIT (acute kidney injury) (H) 03/18/2021     Benign essential hypertension      Cellulitis of left lower extremity 05/29/2020     Cellulitis, face 2014     Chronic kidney disease      Chronic tension-type headache, not intractable 06/10/2019     Depression with anxiety      Essential hypertension 12/18/2015     Fatty liver     ultrasound 2001 demonstrated.     Hematoma of leg, left, subsequent encounter 07/23/2018     History of colon polyps     Colonoscopy November 2018 with multiple polyps removed, repeat in 3 years     History of gastric bypass     Normal sleeve gastrectomy June 2021     Hyperlipidemia      Irritable bowel syndrome      Kidney stones     x 4     LBP (low back pain)      Microscopic hematuria 07/23/2018     Moderate major depression (H) 06/10/2019     Morbid obesity (H) 12/18/2015    Normal sleeve gastrectomy June 2021     Nephrolithiasis 12/2020    Right     Nephrolithiasis     2013, 2009, 2001.  Right percutaneous nephrolithotomy March 2021.  Stone analysis 90% uric acid and 10% calcium oxalate     Numbness of left anterior thigh 11/9/2021  "   Numbness and burning left anterior thigh above his knee     ANAM (obstructive sleep apnea) 01/2021    Severe     Sleep apnea     uses cpap     Suspected sleep apnea 11/16/2018     Tear of meniscus of right knee 12/18/2015    Associated with right tibial plateau fracture 2013, arthroscopic knee surgery December 2015     Vitamin D deficiency     Vitamin D 10.0 November 2018     Wrist fracture      Problem List     Patient Active Problem List   Diagnosis     Snoring     Shifting sleep-work schedule     ANAM (obstructive sleep apnea)     Abnormal liver function     Calculus of kidney     Chronic tension-type headache, not intractable     Essential hypertension     History of colon polyps     Hyperlipidemia     Irritable bowel syndrome     Low back pain     Microscopic hematuria     Vitamin D deficiency     Nephrolithiasis     Moderate major depression (H)     Fatty liver     Morbid obesity (H)     History of gastric bypass     Numbness of left anterior thigh     Acute bilateral low back pain without sciatica     Medications     [unfilled]  Surgical History     Past Surgical History  He has a past surgical history that includes compound fracture (Right); carpal tunnel release rt/lt (Right, 2001); Release carpal tunnel (Right, 2001); Extracorporeal Shock Wave Lithotripsy (2013); KNEE SCOPE,MED-LAT MENISECTOMY (Right, 12/22/2015); [place] Tooth Extraction (2013); Kidney Stone Surgery (Right); other surgical history; and Pr Lap, Lisa Restrict Proc, Longitudinal Gastrectomy (N/A, 6/15/2021).    Objective-Exam     Constitutional:  /84   Ht 1.727 m (5' 8\")   Wt 122 kg (269 lb)   BMI 40.90 kg/m    [unfilled]   General:  Pleasant and in no acute distress   Eyes:  EOMI  ENT:  Airway patent.     Neck:  Respiratory: Normal respiratory effort, no cough, .  CV:  RRR  Gastrointestinal: nontender, no cva tenderness to percussion posteriorly.  Musculoskeletal: muscle mass WNL  Skin: color no pallor hair mild thinning, "   Psychiatric: alert and oriented X3, mood and affect normal    Counseling     We reviewed the important post op bariatric recommendations:  -eating 3 meals daily  -eating protein first, getting >60gm protein daily  -eating slowly, chewing food well  -avoiding/limiting calorie containing beverages  -drinking water 15-30 minutes before or after meals  -limiting restaurant or cafeteria eating to twice a week or less    We discussed the importance of restorative sleep and stress management in maintaining a healthy weight.  We discussed the National Weight Control Registry healthy weight maintenance strategies and ways to optimize metabolism.  We discussed the importance of physical activity including cardiovascular and strength training in maintaining a healthier weight.    We discussed the importance of life-long vitamin supplementation and life-long  follow-up.    Nick was reminded that, to avoid marginal ulcers he should avoid tobacco at all, alcohol in excess, caffeine in excess, and NSAIDS (unless indicated for cardioprotection or othewise and opposed by a PPI).    Irvin Branch MD    [facility].  2022  3:18 PM  [phone] (clinic phone)  [phone] (fax)    No images are attached to the encounter.  Medical Decision Makin minutes spent on the date of the encounter doing chart review, history and exam, documentation and further activities per the note      Again, thank you for allowing me to participate in the care of your patient.        Sincerely,        Irvin Branch MD

## 2022-06-20 LAB — DEPRECATED CALCIDIOL+CALCIFEROL SERPL-MC: 31 UG/L (ref 20–75)

## 2022-06-21 ENCOUNTER — MYC MEDICAL ADVICE (OUTPATIENT)
Dept: INTERNAL MEDICINE | Facility: CLINIC | Age: 62
End: 2022-06-21
Payer: COMMERCIAL

## 2022-06-21 DIAGNOSIS — F32.1 MODERATE MAJOR DEPRESSION (H): ICD-10-CM

## 2022-06-21 RX ORDER — CITALOPRAM HYDROBROMIDE 20 MG/1
20 TABLET ORAL DAILY
Qty: 90 TABLET | Refills: 3 | OUTPATIENT
Start: 2022-06-21

## 2022-06-22 LAB — ZINC SERPL-MCNC: 63.7 UG/DL

## 2022-06-23 LAB
ANNOTATION COMMENT IMP: NORMAL
RETINYL PALMITATE SERPL-MCNC: 0.05 MG/L
VIT A SERPL-MCNC: 0.71 MG/L
VIT B1 PYROPHOSHATE BLD-SCNC: 134 NMOL/L

## 2022-08-30 ENCOUNTER — NURSE TRIAGE (OUTPATIENT)
Dept: INTERNAL MEDICINE | Facility: CLINIC | Age: 62
End: 2022-08-30

## 2022-08-30 NOTE — TELEPHONE ENCOUNTER
Patient called in to see when last tetanus booster was due to having a small puncture from nail this morning.   Pt reports nail was clean/new, it was a surface cut. Did not penetrate deep into skin at all.   Pt reports some redness and discomfort initially, but is able to walk normally, no pain.   Pt did wash out the wound right when it happened.     Last tetanus was 2018.    We discussed s/s to monitor for and discussed symptoms that would warrant urgent visit.     Pt verbalized understanding and had no further questions at this time.     Marium Reyes RN, BSN  [facility]    Reason for Disposition    Minor puncture wound    Additional Information    Negative: Shock suspected (e.g., cold/pale/clammy skin, too weak to stand, low BP, rapid pulse)    Negative: Puncture wound of head, neck, chest, back, or abdomen that sounds life-threatening to triager    Negative: Sounds like a life-threatening emergency to the triager    Negative: Caused by an animal bite    Negative: Skin is cut or scraped, not punctured    Negative: Puncture wound of eye or eyelid    Negative: Foreign body is still in the skin (e.g., splinter, sliver, fishhook)    Negative: Puncture wound of head, neck, chest, abdomen, or overlying a joint and it could be deep    Negative: Needlestick from used or discarded injection needle  (Exception: Clean, unused needle.)    Negative: High pressure injection injury (e.g., from grease gun or paint gun, usually work-related)    Negative: Dirt in the wound and not removed with 15 minutes of scrubbing    Negative: Sounds like a serious injury to the triager    Negative: SEVERE pain (e.g., excruciating)    Negative: Puncture wound of foot and hurts too much to walk on (i.e., unable to bear weight, severe limp)    Negative: Puncture wound of bare foot (no shoes) and setting was dirty    Negative: Puncture wound of foot and puncture went through shoe (e.g., tennis shoe)    Negative: Puncture wound  "of finger and entire finger swollen    Negative: Puncture wound from sharp object that was very dirty (e.g., barnyard)    Negative: Tip of the object is broken off and missing    Negative: Sensation of something still in the wound    Negative: Looks infected (e.g., spreading redness, red streak, pus)    Negative: Pain or swelling present > 5 days    Negative: No prior tetanus shots (or is not fully vaccinated) and any wound (e.g., cut or scrape)    Negative: HIV positive or severe immunodeficiency (severely weak immune system) and DIRTY puncture (e.g., object OR skin was dirty, objects on ground/floor)    Negative: Last tetanus shot > 5 years ago    Negative: Patient wants to be seen    Negative: After 14 days and wound isn't healed    Negative: Puncture wound on foot and patient has diabetes mellitus    Answer Assessment - Initial Assessment Questions  1. LOCATION: \"Where is the puncture located?\"       On the tip of his second and third toe.   2. OBJECT: \"What was the object that punctured the skin?\"       Nail  3. DEPTH: \"How deep do you think the puncture goes?\"       Surface - did not go deep at all.   4. ONSET: \"When did the injury occur?\" (Minutes or hours)      About 30 minutes ago.   5. PAIN: \"Is it painful?\" If Yes, ask: \"How bad is the pain?\"  (Scale 1-10; or mild, moderate, severe)      Not bad, he is able to bear weight, walk on it normally.   6. TETANUS: \"When was the last tetanus booster?\"      Pt's last tetanus was 4 years ago. 7/23/2018  7. PREGNANCY: \"Is there any chance you are pregnant?\" \"When was your last menstrual period?\"      NA    Protocols used: PUNCTURE WOUND-A-OH      "

## 2022-11-13 ASSESSMENT — ENCOUNTER SYMPTOMS
NERVOUS/ANXIOUS: 0
FEVER: 0
SHORTNESS OF BREATH: 0
WEAKNESS: 0
MYALGIAS: 0
PALPITATIONS: 0
PARESTHESIAS: 0
HEMATURIA: 0
ARTHRALGIAS: 0
DIZZINESS: 0
NAUSEA: 0
SORE THROAT: 0
CONSTIPATION: 0
COUGH: 0
HEMATOCHEZIA: 0
HEADACHES: 1
HEARTBURN: 0
FREQUENCY: 0
JOINT SWELLING: 0
DYSURIA: 0
CHILLS: 0
ABDOMINAL PAIN: 0
DIARRHEA: 0
EYE PAIN: 0

## 2022-11-14 ENCOUNTER — OFFICE VISIT (OUTPATIENT)
Dept: INTERNAL MEDICINE | Facility: CLINIC | Age: 62
End: 2022-11-14
Payer: COMMERCIAL

## 2022-11-14 VITALS
HEART RATE: 63 BPM | BODY MASS INDEX: 42.62 KG/M2 | SYSTOLIC BLOOD PRESSURE: 122 MMHG | WEIGHT: 281.2 LBS | HEIGHT: 68 IN | DIASTOLIC BLOOD PRESSURE: 74 MMHG | OXYGEN SATURATION: 96 %

## 2022-11-14 DIAGNOSIS — E66.01 MORBID OBESITY (H): ICD-10-CM

## 2022-11-14 DIAGNOSIS — E55.9 VITAMIN D DEFICIENCY: ICD-10-CM

## 2022-11-14 DIAGNOSIS — N20.0 NEPHROLITHIASIS: ICD-10-CM

## 2022-11-14 DIAGNOSIS — F33.41 RECURRENT MAJOR DEPRESSIVE DISORDER, IN PARTIAL REMISSION (H): ICD-10-CM

## 2022-11-14 DIAGNOSIS — G47.33 OSA (OBSTRUCTIVE SLEEP APNEA): ICD-10-CM

## 2022-11-14 DIAGNOSIS — E78.5 HYPERLIPIDEMIA, UNSPECIFIED HYPERLIPIDEMIA TYPE: ICD-10-CM

## 2022-11-14 DIAGNOSIS — G89.29 CHRONIC BILATERAL LOW BACK PAIN WITHOUT SCIATICA: ICD-10-CM

## 2022-11-14 DIAGNOSIS — Z00.00 ROUTINE GENERAL MEDICAL EXAMINATION AT A HEALTH CARE FACILITY: Primary | ICD-10-CM

## 2022-11-14 DIAGNOSIS — Z86.0100 HISTORY OF COLON POLYPS: ICD-10-CM

## 2022-11-14 DIAGNOSIS — M54.50 CHRONIC BILATERAL LOW BACK PAIN WITHOUT SCIATICA: ICD-10-CM

## 2022-11-14 DIAGNOSIS — I10 ESSENTIAL HYPERTENSION: ICD-10-CM

## 2022-11-14 DIAGNOSIS — R51.9 NEW ONSET HEADACHE: ICD-10-CM

## 2022-11-14 DIAGNOSIS — Z12.5 SCREENING FOR PROSTATE CANCER: ICD-10-CM

## 2022-11-14 DIAGNOSIS — Z98.84 HISTORY OF GASTRIC BYPASS: ICD-10-CM

## 2022-11-14 LAB
ALBUMIN SERPL BCG-MCNC: 4.2 G/DL (ref 3.5–5.2)
ALBUMIN UR-MCNC: NEGATIVE MG/DL
ALP SERPL-CCNC: 67 U/L (ref 40–129)
ALT SERPL W P-5'-P-CCNC: 14 U/L (ref 10–50)
ANION GAP SERPL CALCULATED.3IONS-SCNC: 9 MMOL/L (ref 7–15)
APPEARANCE UR: CLEAR
AST SERPL W P-5'-P-CCNC: 21 U/L (ref 10–50)
BILIRUB SERPL-MCNC: 0.6 MG/DL
BILIRUB UR QL STRIP: NEGATIVE
BUN SERPL-MCNC: 13.1 MG/DL (ref 8–23)
CALCIUM SERPL-MCNC: 9.5 MG/DL (ref 8.8–10.2)
CHLORIDE SERPL-SCNC: 106 MMOL/L (ref 98–107)
CHOLEST SERPL-MCNC: 199 MG/DL
COLOR UR AUTO: YELLOW
CREAT SERPL-MCNC: 1.02 MG/DL (ref 0.67–1.17)
DEPRECATED HCO3 PLAS-SCNC: 29 MMOL/L (ref 22–29)
ERYTHROCYTE [DISTWIDTH] IN BLOOD BY AUTOMATED COUNT: 12.9 % (ref 10–15)
GFR SERPL CREATININE-BSD FRML MDRD: 84 ML/MIN/1.73M2
GLUCOSE SERPL-MCNC: 92 MG/DL (ref 70–99)
GLUCOSE UR STRIP-MCNC: NEGATIVE MG/DL
HCT VFR BLD AUTO: 45.6 % (ref 40–53)
HDLC SERPL-MCNC: 45 MG/DL
HGB BLD-MCNC: 15 G/DL (ref 13.3–17.7)
HGB UR QL STRIP: NEGATIVE
KETONES UR STRIP-MCNC: NEGATIVE MG/DL
LDLC SERPL CALC-MCNC: 129 MG/DL
LEUKOCYTE ESTERASE UR QL STRIP: NEGATIVE
MCH RBC QN AUTO: 28.6 PG (ref 26.5–33)
MCHC RBC AUTO-ENTMCNC: 32.9 G/DL (ref 31.5–36.5)
MCV RBC AUTO: 87 FL (ref 78–100)
NITRATE UR QL: NEGATIVE
NONHDLC SERPL-MCNC: 154 MG/DL
PH UR STRIP: 6.5 [PH] (ref 5–8)
PLATELET # BLD AUTO: 185 10E3/UL (ref 150–450)
POTASSIUM SERPL-SCNC: 4.8 MMOL/L (ref 3.4–5.3)
PROT SERPL-MCNC: 6.9 G/DL (ref 6.4–8.3)
PSA SERPL-MCNC: 0.63 NG/ML (ref 0–4.5)
RBC # BLD AUTO: 5.24 10E6/UL (ref 4.4–5.9)
SODIUM SERPL-SCNC: 144 MMOL/L (ref 136–145)
SP GR UR STRIP: 1.02 (ref 1–1.03)
TRIGL SERPL-MCNC: 127 MG/DL
UROBILINOGEN UR STRIP-ACNC: 1 E.U./DL
WBC # BLD AUTO: 5.4 10E3/UL (ref 4–11)

## 2022-11-14 PROCEDURE — 99396 PREV VISIT EST AGE 40-64: CPT | Mod: 25 | Performed by: INTERNAL MEDICINE

## 2022-11-14 PROCEDURE — G0103 PSA SCREENING: HCPCS | Performed by: INTERNAL MEDICINE

## 2022-11-14 PROCEDURE — 80053 COMPREHEN METABOLIC PANEL: CPT | Performed by: INTERNAL MEDICINE

## 2022-11-14 PROCEDURE — 81003 URINALYSIS AUTO W/O SCOPE: CPT | Performed by: INTERNAL MEDICINE

## 2022-11-14 PROCEDURE — 90682 RIV4 VACC RECOMBINANT DNA IM: CPT | Performed by: INTERNAL MEDICINE

## 2022-11-14 PROCEDURE — 90471 IMMUNIZATION ADMIN: CPT | Performed by: INTERNAL MEDICINE

## 2022-11-14 PROCEDURE — 36415 COLL VENOUS BLD VENIPUNCTURE: CPT | Performed by: INTERNAL MEDICINE

## 2022-11-14 PROCEDURE — 99214 OFFICE O/P EST MOD 30 MIN: CPT | Mod: 25 | Performed by: INTERNAL MEDICINE

## 2022-11-14 PROCEDURE — 80061 LIPID PANEL: CPT | Performed by: INTERNAL MEDICINE

## 2022-11-14 PROCEDURE — 85027 COMPLETE CBC AUTOMATED: CPT | Performed by: INTERNAL MEDICINE

## 2022-11-14 ASSESSMENT — ENCOUNTER SYMPTOMS
PARESTHESIAS: 0
FREQUENCY: 0
SORE THROAT: 0
NAUSEA: 0
DIZZINESS: 0
PALPITATIONS: 0
NERVOUS/ANXIOUS: 0
WEAKNESS: 0
DIARRHEA: 0
HEARTBURN: 0
EYE PAIN: 0
MYALGIAS: 0
SHORTNESS OF BREATH: 0
COUGH: 0
CHILLS: 0
HEADACHES: 1
JOINT SWELLING: 0
CONSTIPATION: 0
ARTHRALGIAS: 0
DYSURIA: 0
FEVER: 0
HEMATURIA: 0
ABDOMINAL PAIN: 0
HEMATOCHEZIA: 0

## 2022-11-14 ASSESSMENT — PATIENT HEALTH QUESTIONNAIRE - PHQ9
SUM OF ALL RESPONSES TO PHQ QUESTIONS 1-9: 4
10. IF YOU CHECKED OFF ANY PROBLEMS, HOW DIFFICULT HAVE THESE PROBLEMS MADE IT FOR YOU TO DO YOUR WORK, TAKE CARE OF THINGS AT HOME, OR GET ALONG WITH OTHER PEOPLE: SOMEWHAT DIFFICULT
SUM OF ALL RESPONSES TO PHQ QUESTIONS 1-9: 4

## 2022-11-14 NOTE — PROGRESS NOTES
SUBJECTIVE:   CC: Nick is an 62 year old who presents for preventative health visit.     XAA-82-warg-old male here for annual preventive visit and follow-up multiple medical concerns including history of morbid obesity with gastric bypass surgery, history of depression, hypertension, new onset headaches, sleep apnea, chronic low back pain and hyperlipidemia.  See assessment and plan for details.      Patient has been advised of split billing requirements and indicates understanding: Yes  Healthy Habits:     Getting at least 3 servings of Calcium per day:  Yes    Bi-annual eye exam:  Yes    Dental care twice a year:  NO    Sleep apnea or symptoms of sleep apnea:  Sleep apnea    Diet:  Other    Frequency of exercise:  None    Taking medications regularly:  Yes    Medication side effects:  None    PHQ-2 Total Score: 2    Additional concerns today:  No              Today's PHQ-2 Score:   PHQ-2 ( 1999 Pf[employer]) 11/13/2022   Q1: Little interest or pleasure in doing things 1   Q2: Feeling down, depressed or hopeless 1   PHQ-2 Score 2   PHQ-2 Total Score (12-17 Years)- Positive if 3 or more points; Administer PHQ-A if positive -   Q1: Little interest or pleasure in doing things Several days   Q2: Feeling down, depressed or hopeless Several days   PHQ-2 Score 2       Abuse: Current or Past(Physical, Sexual or Emotional)- No  Do you feel safe in your environment? Yes        Social History     Tobacco Use     Smoking status: Never     Smokeless tobacco: Never     Tobacco comments:     cigar once or twice a year   Substance Use Topics     Alcohol use: Not Currently     Comment: infrequent         Alcohol Use 11/13/2022   Prescreen: >3 drinks/day or >7 drinks/week? Not Applicable   Prescreen: >3 drinks/day or >7 drinks/week? -       Last PSA:   Prostate Specific Antigen Screen   Date Value Ref Range Status   11/09/2021 0.69 0.00 - 3.50 ug/L Final       Reviewed orders with patient. Reviewed health maintenance and updated orders  accordingly - Yes  Lab work is in process    Reviewed and updated as needed this visit by clinical staff   Tobacco  Allergies  Meds  Problems  Med Hx  Surg Hx  Fam Hx          Reviewed and updated as needed this visit by Provider   Tobacco  Allergies  Meds  Problems  Med Hx  Surg Hx  Fam Hx         Past Medical History:   Diagnosis Date     Abnormal LFTs (liver function tests) 12/18/2015    Negative hepatitis C antibody and ferritin of 330 6 December 2015, steatohepatitis suspected     Acute bilateral low back pain without sciatica 5/2/2022     Acute blood loss anemia 03/18/2021     WHIT (acute kidney injury) (H) 03/18/2021     Benign essential hypertension      Cellulitis of left lower extremity 05/29/2020     Cellulitis, face 2014     Chronic bilateral low back pain without sciatica 11/14/2022     Chronic kidney disease      Chronic tension-type headache, not intractable 06/10/2019     Depression with anxiety      Essential hypertension 12/18/2015     Fatty liver     ultrasound 2001 demonstrated.     Hematoma of leg, left, subsequent encounter 07/23/2018     History of colon polyps     Colonoscopy November 2018 with multiple polyps removed, repeat in 3 years     History of gastric bypass     Normal sleeve gastrectomy June 2021     Hyperlipidemia      Irritable bowel syndrome      Kidney stones     x 4     LBP (low back pain)      Microscopic hematuria 07/23/2018     Moderate major depression (H) 06/10/2019     Morbid obesity (H) 12/18/2015    Normal sleeve gastrectomy June 2021     Nephrolithiasis 12/2020    Right     Nephrolithiasis     2013, 2009, 2001.  Right percutaneous nephrolithotomy March 2021.  Stone analysis 90% uric acid and 10% calcium oxalate     New onset headache 11/14/2022     Numbness of left anterior thigh 11/9/2021    Numbness and burning left anterior thigh above his knee     ANAM (obstructive sleep apnea) 01/2021    Severe     Sleep apnea     uses cpap     Suspected sleep apnea  11/16/2018     Tear of meniscus of right knee 12/18/2015    Associated with right tibial plateau fracture 2013, arthroscopic knee surgery December 2015     Vitamin D deficiency     Vitamin D 10.0 November 2018     Wrist fracture       Past Surgical History:   Procedure Laterality Date     CARPAL TUNNEL RELEASE RT/LT Right 2001     compound fracture Right     age12, right arm     EXTRACORPOREAL SHOCK WAVE LITHOTRIPSY  2013    for kidney stones     HC KNEE SCOPE,SINGLE MENISECTOMY Right 12/22/2015    Procedure: RIGHT KNEE ARTHROSCOPIC MENISCECTOMY;  Surgeon: Chandu Moyer MD;  Location: [facility] OR;  Service: Orthopedics     KIDNEY STONE SURGERY Right     Right percutaneous nephrolithotomy March 2021     OTHER SURGICAL HISTORY      arm fracture repair     KY LAP, CHIDI RESTRICT PROC, LONGITUDINAL GASTRECTOMY N/A 6/15/2021    Procedure: GASTRECTOMY, SLEEVE, LAPAROSCOPIC;  Surgeon: Aubrey Olmos MD;  Location: [facility] OR;  Service: General     RELEASE CARPAL TUNNEL Right 2001     WISDOM TOOTH EXTRACTION  2013       Review of Systems   Constitutional: Negative for chills and fever.   HENT: Negative for congestion, ear pain, hearing loss and sore throat.    Eyes: Negative for pain and visual disturbance.   Respiratory: Negative for cough and shortness of breath.    Cardiovascular: Negative for chest pain, palpitations and peripheral edema.   Gastrointestinal: Negative for abdominal pain, constipation, diarrhea, heartburn, hematochezia and nausea.   Genitourinary: Negative for dysuria, frequency, genital sores, hematuria, impotence, penile discharge and urgency.   Musculoskeletal: Negative for arthralgias, joint swelling and myalgias.   Skin: Negative for rash.   Neurological: Positive for headaches. Negative for dizziness, weakness and paresthesias.   Psychiatric/Behavioral: Negative for mood changes. The patient is not nervous/anxious.          OBJECTIVE:   /74 (BP Location: Right arm, Patient  "Position: Sitting, Cuff Size: Adult Large)   Pulse 63   Ht 1.721 m (5' 7.75\")   Wt 127.6 kg (281 lb 3.2 oz)   SpO2 96%   BMI 43.07 kg/m      Physical Exam  EYES: Eyelids, conjunctiva, and sclera were normal. Pupils were normal. Cornea, iris, and lens were normal bilaterally.  HEAD, EARS, NOSE, MOUTH, AND THROAT: Head and face were normal. TMs and external auditory canals are normal  NECK: Neck appearance was normal. There were no neck masses and the thyroid was not enlarged and no nodules are felt.  No lymphadenopathy.  RESPIRATORY: Breathing pattern was normal and the chest moved symmetrically.   Lung sounds were normal and there were no rales or wheezes.  CARDIOVASCULAR: Heart rate and rhythm were normal.  S1 and S2 were normal and there were no extra sounds or murmurs. Peripheral pulses in arms and legs were normal.  There was no peripheral edema.  No carotid bruits.  GASTROINTESTINAL:  Bowel sounds were present.   Palpation detected no tenderness, mass, or enlarged organs.   MUSCULOSKELETAL: Skeletal configuration was normal and muscle mass was normal for age. Joint appearance was overall normal.  LYMPHATIC: There were no enlarged nodes.  SKIN/HAIR/NAILS: Skin color was normal.  There were no concerning skin lesions.  NEUROLOGIC: The patient was alert and oriented to person, place, time, and circumstance. Speech was normal. Cranial nerves were normal. Motor strength was normal for age. The patient was normally coordinated.  Sensation intact.  PSYCHIATRIC: PHQ-9 score is 4        ASSESSMENT/PLAN:   1. Routine general medical examination at a health care facility  Immunizations are reviewed and providing flu shot.  Recommending COVID booster.  He is also eligible for Shingrix.  Living will discussed.  Non-smoker.  Uses alcohol in moderation.  Regular exercise and healthy diet habits to maintain a healthy weight discussed.  He is due for a screening colonoscopy and I am asking him to get this scheduled with " number provided.  Prostate exam is deferred but I will check a PSA for prostate cancer screening.   He sees his ophthalmologist every year.  Regular dental visits every 6 months discussed.  Skin exam performed and recommending regular use of sunblock.  Hepatitis C antibody for screening was normal.  Will screen for diabetes with fasting glucose.       2. Morbid obesity (H)  Gastric bypass surgery 2021.  He has gained back 11 pounds over the past several months.  Encouraged him to get back with more regular exercise and watching diet more carefully.  He is followed by bariatric clinic.    3. History of gastric bypass  As above    4. Recurrent major depressive disorder, in partial remission (H)  Life has been more stressful.  PHQ-9 score is 4.  Interfering with his ability to maintain regular exercise routine.  Continues on citalopram.  Stressing that he needs to take care of it of his own needs before trying to help everyone else.    5. Essential hypertension  Blood pressure looks reasonably well controlled with current dose of lisinopril.  Tolerating medication without side effects.  Not feeling lightheaded.  - CBC with platelets; Future  - Comprehensive metabolic panel (BMP + Alb, Alk Phos, ALT, AST, Total. Bili, TP); Future  - UA Macro with Reflex to Micro and Culture - lab collect; Future    6. New onset headache  He has felt a dull frontal headache for the past several weeks.  No other worrisome neurologic symptoms.  May be tension headache from stress or due to not wearing his CPAP faithfully.  Encouraging him to resume CPAP every night.  If headache persists beyond the next 4 weeks or is getting worse, he is to let me know.  Consider imaging given new onset.    7. ANAM (obstructive sleep apnea)  Encouraging him to wear his CPAP more safely at night.    8. Chronic bilateral low back pain without sciatica  Ongoing chronic low back pain.  No sciatica.  Beto exercises provided.    9. Hyperlipidemia, unspecified  "hyperlipidemia type  Recheck a lipid profile taking rosuvastatin  - Lipid Profile (Chol, Trig, HDL, LDL calc); Future    10. Vitamin D deficiency  He is on vitamin D replacement and followed by bariatric clinic    11. Nephrolithiasis  We discussed cutting back or discontinuing calcium supplement but I will let him discuss further with bariatric clinic who has prescribed    12. History of colon polyps  He has a history of multiple polyps removed in 2018 and is due for a screening colonoscopy.  I am asking him to get this scheduled    13. Screening for prostate cancer  Exam deferred but will continue to check PSA annually for prostate cancer screening  - PSA, screen; Future              Estimated body mass index is 43.07 kg/m  as calculated from the following:    Height as of this encounter: 1.721 m (5' 7.75\").    Weight as of this encounter: 127.6 kg (281 lb 3.2 oz).         He reports that he has never smoked. He has never used smokeless tobacco.      Counseling Resources:  ATP IV Guidelines  Pooled Cohorts Equation Calculator  FRAX Risk Assessment  ICSI Preventive Guidelines  Dietary Guidelines for Americans, 2010  Scopis's MyPlate  ASA Prophylaxis  Lung CA Screening    Long Weston MD  [facility]  Answers for HPI/ROS submitted by the patient on 11/14/2022  If you checked off any problems, how difficult have these problems made it for you to do your work, take care of things at home, or get along with other people?: Somewhat difficult  PHQ9 TOTAL SCORE: 4      "

## 2022-11-14 NOTE — PATIENT INSTRUCTIONS
Please let me know if you are headache is not resolving or getting any worse over the next 4 weeks even after using your CPAP more faithfully    Preventive Health Recommendations  Male Ages 50 - 64    Yearly exam:             See your health care provider every year in order to  o   Review health changes.   o   Discuss preventive care.    o   Review your medicines if your doctor has prescribed any.   Cholesterol screening  Diabetes screening  Your due for a screening colonoscopy.  Please call [facility] at [phone] to schedule  Annual PSA for prostate cancer screening  You should be tested each year for STDs (sexually transmitted diseases), if you re at risk.     Shots: Get a flu shot each year. Get a tetanus shot every 10 years.  I recommend a COVID booster this fall.  You are also eligible for the shingles vaccine, Shingrix.  This can be obtained at your pharmacy.    Nutrition:  Eat at least 5 servings of fruits and vegetables daily.   Eat whole-grain bread, whole-wheat pasta and brown rice instead of white grains and rice.   Get adequate Calcium and Vitamin D.  Ask the bariatric clinic whether you can cut back on the calcium supplements with your history of kidney stones    Lifestyle  Exercise for at least 150 minutes a week (30 minutes a day, 5 days a week). This will help you control your weight and prevent disease.   Limit alcohol to one drink per day.   No smoking.   Wear sunscreen to prevent skin cancer.   See your dentist every six months for an exam and cleaning.   See your eye doctor every 1 to 2 years.

## 2022-12-05 DIAGNOSIS — R12 HEARTBURN: ICD-10-CM

## 2022-12-05 DIAGNOSIS — R10.13 ABDOMINAL PAIN, EPIGASTRIC: ICD-10-CM

## 2022-12-05 DIAGNOSIS — Z90.3 HISTORY OF SLEEVE GASTRECTOMY: ICD-10-CM

## 2022-12-06 ENCOUNTER — E-VISIT (OUTPATIENT)
Dept: INTERNAL MEDICINE | Facility: CLINIC | Age: 62
End: 2022-12-06
Payer: COMMERCIAL

## 2022-12-06 DIAGNOSIS — J01.90 ACUTE SINUSITIS, RECURRENCE NOT SPECIFIED, UNSPECIFIED LOCATION: Primary | ICD-10-CM

## 2022-12-06 PROCEDURE — 99421 OL DIG E/M SVC 5-10 MIN: CPT | Performed by: INTERNAL MEDICINE

## 2022-12-06 RX ORDER — AZITHROMYCIN 250 MG/1
TABLET, FILM COATED ORAL
Qty: 6 TABLET | Refills: 0 | Status: SHIPPED | OUTPATIENT
Start: 2022-12-06 | End: 2022-12-11

## 2022-12-06 NOTE — PATIENT INSTRUCTIONS
Sinusitis (Antibiotic Treatment)    The sinuses are air-filled spaces within the bones of the face. They connect to the inside of the nose. Sinusitis is an inflammation of the tissue that lines the sinuses. Sinusitis can occur during a cold. It can also happen due to allergies to pollens and other particles in the air. Sinusitis can cause symptoms of sinus congestion and a feeling of fullness. A sinus infection causes fever, headache, and facial pain. There is often green or yellow fluid draining from the nose or into the back of the throat (post-nasal drip). You have been given antibiotics to treat this condition.   Home care    Take the full course of antibiotics as instructed. Don't stop taking them, even when you feel better.    Drink plenty of water, hot tea, and other liquids as directed by the healthcare provider. This may help thin nasal mucus. It also may help your sinuses drain fluids.    Heat may help soothe painful areas of your face. Use a towel soaked in hot water. Or,  the shower and direct the warm spray onto your face. Using a vaporizer along with a menthol rub at night may also help soothe symptoms.     An expectorant with guaifenesin may help thin nasal mucus and help your sinuses drain fluids. Talk with your provider or pharmacists before taking an over-the-counter (OTC) medicine if you have any questions about it or its side effects..    You can use an OTC decongestant, unless a similar medicine was prescribed to you. Nasal sprays work the fastest. Use one that contains phenylephrine or oxymetazoline. First blow your nose gently. Then use the spray. Don't use these medicines more often than directed on the label. If you do, your symptoms may get worse. You may also take pills that contain pseudoephedrine. Don t use products that combine multiple medicines. This is because side effects may be increased. Read labels. You can also ask the pharmacist for help. (People with high blood  pressure should not use decongestants. They can raise blood pressure.) Talk with your provider or pharmacist if you have any questions about the medicine..    OTC antihistamines may help if allergies contributed to your sinusitis. Talk with your provider or pharmacist if you have any questions about the medicine..    Don't use nasal rinses or irrigation during an acute sinus infection, unless your healthcare provider tells you to. Rinsing may spread the infection to other areas in your sinuses.    Use acetaminophen or ibuprofen to control pain, unless another pain medicine was prescribed to you. If you have chronic liver or kidney disease or ever had a stomach ulcer, talk with your healthcare provider before using these medicines. Never give aspirin to anyone under age 18 who is ill with a fever. It may cause severe liver damage.    Don't smoke. This can make symptoms worse.    Follow-up care  Follow up with your healthcare provider, or as advised.   When to seek medical advice  Call your healthcare provider if any of these occur:     Facial pain or headache that gets worse    Stiff neck    Unusual drowsiness or confusion    Swelling of your forehead or eyelids    Symptoms don't go away in 10 days    Vision problems, such as blurred or double vision    Fever of 100.4 F (38 C) or higher, or as directed by your healthcare provider  Call 911  Call 911 if any of these occur:     Seizure    Trouble breathing    Feeling dizzy or faint    Fingernails, skin or lips look blue, purple , or gray  Prevention  Here are steps you can take to help prevent an infection:     Keep good hand washing habits.    Don t have close contact with people who have sore throats, colds, or other upper respiratory infections.    Don t smoke, and stay away from secondhand smoke.    Stay up to date with of your vaccines.  [employer] last reviewed this educational content on 12/1/2019 2000-2021 [employer]. All rights reserved. This  information is not intended as a substitute for professional medical care. Always follow your healthcare professional's instructions.        Hi Nick,    Your symptoms are suspicious for a sinus infection I will send a Z-Mikael to your pharmacy.    Long Weston MD  Internal Medicine  [facility]

## 2023-01-11 DIAGNOSIS — E78.2 MIXED HYPERLIPIDEMIA: ICD-10-CM

## 2023-01-11 DIAGNOSIS — F32.1 MODERATE MAJOR DEPRESSION (H): ICD-10-CM

## 2023-01-12 RX ORDER — ROSUVASTATIN CALCIUM 10 MG/1
TABLET, COATED ORAL
Qty: 90 TABLET | Refills: 3 | Status: SHIPPED | OUTPATIENT
Start: 2023-01-12 | End: 2024-01-19

## 2023-01-12 RX ORDER — CITALOPRAM HYDROBROMIDE 20 MG/1
TABLET ORAL
Qty: 90 TABLET | Refills: 1 | Status: SHIPPED | OUTPATIENT
Start: 2023-01-12 | End: 2023-10-09

## 2023-01-12 RX ORDER — ROSUVASTATIN CALCIUM 10 MG/1
TABLET, COATED ORAL
Qty: 90 TABLET | Refills: 3 | OUTPATIENT
Start: 2023-01-12

## 2023-03-21 ENCOUNTER — E-VISIT (OUTPATIENT)
Dept: INTERNAL MEDICINE | Facility: CLINIC | Age: 63
End: 2023-03-21
Payer: COMMERCIAL

## 2023-03-21 DIAGNOSIS — J01.90 ACUTE NON-RECURRENT SINUSITIS, UNSPECIFIED LOCATION: Primary | ICD-10-CM

## 2023-03-21 PROCEDURE — 99421 OL DIG E/M SVC 5-10 MIN: CPT | Performed by: INTERNAL MEDICINE

## 2023-03-21 RX ORDER — AZITHROMYCIN 250 MG/1
TABLET, FILM COATED ORAL
Qty: 6 TABLET | Refills: 0 | Status: SHIPPED | OUTPATIENT
Start: 2023-03-21 | End: 2023-03-26

## 2023-03-21 NOTE — PATIENT INSTRUCTIONS
Dear Nick     After reviewing your responses, I've been able to diagnose you with a sinus infection.  Good to hear that your COVID test was negative.    I will send a Z-Mikael over to your pharmacy.    It is also important to stay well hydrated, get lots of rest and take over-the-counter decongestants,?tylenol?or ibuprofen if you?are able to?take those medications per your primary care provider to help relieve discomfort.?     It is important that you take?all of?your prescribed medication even if your symptoms are improving after a few doses.? Taking?all of?your medicine helps prevent the symptoms from returning.?     If your symptoms worsen, you develop severe headache, vomiting, high fever (>102), or are not improving in 7 days, please contact your primary care provider for an appointment or visit any of our convenient Walk-in Care or Urgent Care Centers to be seen which can be found on our website?here.?     Thanks again for choosing?us?as your health care partner,?   ?  Long Weston MD?

## 2023-04-03 ENCOUNTER — MYC MEDICAL ADVICE (OUTPATIENT)
Dept: INTERNAL MEDICINE | Facility: CLINIC | Age: 63
End: 2023-04-03
Payer: COMMERCIAL

## 2023-04-25 ENCOUNTER — MYC MEDICAL ADVICE (OUTPATIENT)
Dept: INTERNAL MEDICINE | Facility: CLINIC | Age: 63
End: 2023-04-25
Payer: COMMERCIAL

## 2023-04-25 DIAGNOSIS — E53.8 VITAMIN B12 DEFICIENCY (NON ANEMIC): ICD-10-CM

## 2023-05-01 RX ORDER — MAGNESIUM 200 MG
1000 TABLET ORAL DAILY
Qty: 90 TABLET | Refills: 3 | OUTPATIENT
Start: 2023-05-01

## 2023-05-08 ENCOUNTER — MYC MEDICAL ADVICE (OUTPATIENT)
Dept: INTERNAL MEDICINE | Facility: CLINIC | Age: 63
End: 2023-05-08
Payer: COMMERCIAL

## 2023-05-08 DIAGNOSIS — E53.8 VITAMIN B12 DEFICIENCY (NON ANEMIC): ICD-10-CM

## 2023-05-08 RX ORDER — MAGNESIUM 200 MG
1000 TABLET ORAL DAILY
Qty: 90 TABLET | Refills: 3 | Status: SHIPPED | OUTPATIENT
Start: 2023-05-08

## 2023-05-08 NOTE — TELEPHONE ENCOUNTER
"Approved per [facility] RN refill protocol.    Marium OWEN, RN  [facility]    Requested Prescriptions   Signed Prescriptions Disp Refills    cyanocobalamin 1000 MCG sublingual tablet 90 tablet 3     Sig: Place 1 tablet (1,000 mcg) under the tongue daily       Vitamin Supplements (Adult) Protocol Passed - 5/8/2023 10:58 AM        Passed - High dose Vitamin D not ordered        Passed - Recent (12 mo) or future (30 days) visit within the authorizing provider's specialty     Patient has had an office visit with the authorizing provider or a provider within the authorizing providers department within the previous 12 mos or has a future within next 30 days. See \"Patient Info\" tab in inbasket, or \"Choose Columns\" in Meds & Orders section of the refill encounter.              Passed - Medication is active on med list             "

## 2023-10-08 DIAGNOSIS — F32.1 MODERATE MAJOR DEPRESSION (H): ICD-10-CM

## 2023-10-09 RX ORDER — CITALOPRAM HYDROBROMIDE 20 MG/1
TABLET ORAL
Qty: 90 TABLET | Refills: 0 | Status: SHIPPED | OUTPATIENT
Start: 2023-10-09 | End: 2024-01-08

## 2023-10-10 ENCOUNTER — OFFICE VISIT (OUTPATIENT)
Dept: FAMILY MEDICINE | Facility: CLINIC | Age: 63
End: 2023-10-10
Payer: COMMERCIAL

## 2023-10-10 VITALS
TEMPERATURE: 98.1 F | OXYGEN SATURATION: 97 % | HEART RATE: 61 BPM | SYSTOLIC BLOOD PRESSURE: 149 MMHG | RESPIRATION RATE: 18 BRPM | DIASTOLIC BLOOD PRESSURE: 89 MMHG

## 2023-10-10 DIAGNOSIS — M65.332 TRIGGER MIDDLE FINGER OF LEFT HAND: Primary | ICD-10-CM

## 2023-10-10 PROCEDURE — 99213 OFFICE O/P EST LOW 20 MIN: CPT | Performed by: PHYSICIAN ASSISTANT

## 2023-10-10 NOTE — PATIENT INSTRUCTIONS
You have a trigger finger.  Follow-up with orthopedics for definitive evaluation and treatment

## 2023-10-10 NOTE — PROGRESS NOTES
Patient presents with:  Finger: Has pain rt 3rd finger and in palm of hand on going over 1 month hard to bend finger      Clinical Decision Making:  Patient is referred to orthopedics for a right trigger finger.  Patient education handout from [employer] on trigger finger for patient education symptomatic care. Expected course of resolution and indication for return was gone over and questions were answered to patient/parent's satisfaction before discharge.        ICD-10-CM    1. Trigger middle finger of left hand  M65.332 Orthopedic  Referral          Patient Instructions   You have a trigger finger.  Follow-up with orthopedics for definitive evaluation and treatment        HPI:  Nick Coley is a 63 year old male who is a left-hand-dominant male who presents today for pain and swelling locking and catching of the right trigger finger.  This has been intermittently happening over the last 1 month.  Patient has not had a history of diabetes mellitus type 2 and not have known hypothyroidism.  At this time there is no involvement of the other fingers of the right hand or the contralateral left hand.    History obtained from chart review and the patient.    Problem List:  2022-11: Chronic bilateral low back pain without sciatica  2022-11: New onset headache  2022-05: Acute bilateral low back pain without sciatica  2021-11: Numbness of left anterior thigh  2021-03: Acute blood loss anemia  2021-03: WHIT (acute kidney injury) (H24)  2021-01: ANAM (obstructive sleep apnea)  2020-12: Abnormal liver function  2020-12: History of colon polyps  2020-12: Hyperlipidemia  2020-12: Irritable bowel syndrome  2020-12: Low back pain  2020-12: Vitamin D deficiency  2020-10: Morbid obesity (H)  2020-10: Snoring  2020-10: Shifting sleep-work schedule  2019-06: Chronic tension-type headache, not intractable  2019-06: Moderate major depression (H)  2018-07: Microscopic hematuria  2015-12:  Essential hypertension  2015-12: Morbid obesity (H)  2009-05: Calculus of kidney  Depression with anxiety  Nephrolithiasis  Fatty liver  History of gastric bypass      Past Medical History:   Diagnosis Date    Abnormal LFTs (liver function tests) 12/18/2015    Negative hepatitis C antibody and ferritin of 330 6 December 2015, steatohepatitis suspected    Acute bilateral low back pain without sciatica 5/2/2022    Acute blood loss anemia 03/18/2021    WHIT (acute kidney injury) (H24) 03/18/2021    Benign essential hypertension     Cellulitis of left lower extremity 05/29/2020    Cellulitis, face 2014    Chronic bilateral low back pain without sciatica 11/14/2022    Chronic kidney disease     Chronic tension-type headache, not intractable 06/10/2019    Depression with anxiety     Essential hypertension 12/18/2015    Fatty liver     ultrasound 2001 demonstrated.    Hematoma of leg, left, subsequent encounter 07/23/2018    History of colon polyps     Colonoscopy November 2018 with multiple polyps removed, repeat in 3 years    History of gastric bypass     Normal sleeve gastrectomy June 2021    Hyperlipidemia     Irritable bowel syndrome     Kidney stones     x 4    LBP (low back pain)     Microscopic hematuria 07/23/2018    Moderate major depression (H) 06/10/2019    Morbid obesity (H) 12/18/2015    Normal sleeve gastrectomy June 2021    Nephrolithiasis 12/2020    Right    Nephrolithiasis     2013, 2009, 2001.  Right percutaneous nephrolithotomy March 2021.  Stone analysis 90% uric acid and 10% calcium oxalate    New onset headache 11/14/2022    Numbness of left anterior thigh 11/9/2021    Numbness and burning left anterior thigh above his knee    ANAM (obstructive sleep apnea) 01/2021    Severe    Sleep apnea     uses cpap    Suspected sleep apnea 11/16/2018    Tear of meniscus of right knee 12/18/2015    Associated with right tibial plateau fracture 2013, arthroscopic knee surgery December 2015    Vitamin D deficiency      Vitamin D 10.0 November 2018    Wrist fracture        Social History     Tobacco Use    Smoking status: Never    Smokeless tobacco: Never    Tobacco comments:     cigar once or twice a year   Substance Use Topics    Alcohol use: Not Currently     Comment: infrequent       Review of Systems  As above in HPI otherwise negative.    Vitals:    10/10/23 1008   BP: (!) 149/89   Pulse: 61   Resp: 18   Temp: 98.1  F (36.7  C)   TempSrc: Oral   SpO2: 97%       General: Patient is resting comfortably no acute distress is afebrile  HEENT: Head is normocephalic atraumatic   Musculoskeletal:  Patient has a bump mass and painful tenderness to palpation over the A1 pulley of the palmar aspect of the right long finger consistent with trigger finger.    Physical Exam      At the end of the encounter, I discussed results, diagnosis, medications. Discussed red flags for immediate return to clinic/ER, as well as indications for follow up if no improvement. Patient understood and agreed to plan. Patient was stable for discharge.

## 2023-10-16 NOTE — TELEPHONE ENCOUNTER
DIAGNOSIS: Trigger middle finger of left hand, referring provider Tunde Vega PA-C, no images    APPOINTMENT DATE: 10/31/23   NOTES STATUS DETAILS   OFFICE NOTE from referring provider Internal 10/10/23 - Tunde Vega PA-C   MEDICATION LIST Internal

## 2023-10-31 ENCOUNTER — PRE VISIT (OUTPATIENT)
Dept: ORTHOPEDICS | Facility: CLINIC | Age: 63
End: 2023-10-31

## 2023-10-31 ENCOUNTER — OFFICE VISIT (OUTPATIENT)
Dept: ORTHOPEDICS | Facility: CLINIC | Age: 63
End: 2023-10-31
Attending: PHYSICIAN ASSISTANT
Payer: COMMERCIAL

## 2023-10-31 DIAGNOSIS — M65.331 TRIGGER MIDDLE FINGER OF RIGHT HAND: ICD-10-CM

## 2023-10-31 PROCEDURE — 99203 OFFICE O/P NEW LOW 30 MIN: CPT | Mod: 25 | Performed by: STUDENT IN AN ORGANIZED HEALTH CARE EDUCATION/TRAINING PROGRAM

## 2023-10-31 PROCEDURE — 20550 NJX 1 TENDON SHEATH/LIGAMENT: CPT | Mod: F7 | Performed by: STUDENT IN AN ORGANIZED HEALTH CARE EDUCATION/TRAINING PROGRAM

## 2023-10-31 RX ORDER — TRIAMCINOLONE ACETONIDE 40 MG/ML
40 INJECTION, SUSPENSION INTRA-ARTICULAR; INTRAMUSCULAR
Status: DISCONTINUED | OUTPATIENT
Start: 2023-10-31 | End: 2024-01-19

## 2023-10-31 RX ORDER — LIDOCAINE HYDROCHLORIDE 10 MG/ML
1 INJECTION, SOLUTION EPIDURAL; INFILTRATION; INTRACAUDAL; PERINEURAL
Status: DISCONTINUED | OUTPATIENT
Start: 2023-10-31 | End: 2024-01-19

## 2023-10-31 RX ADMIN — TRIAMCINOLONE ACETONIDE 40 MG: 40 INJECTION, SUSPENSION INTRA-ARTICULAR; INTRAMUSCULAR at 15:32

## 2023-10-31 RX ADMIN — LIDOCAINE HYDROCHLORIDE 1 ML: 10 INJECTION, SOLUTION EPIDURAL; INFILTRATION; INTRACAUDAL; PERINEURAL at 15:32

## 2023-10-31 NOTE — LETTER
10/31/2023      RE: Nick Coley  [address] [place] [address]     Dear Colleague,    Thank you for referring your patient, Nick Coley, to the [facility] [place]. Please see a copy of my visit note below.    Sports Medicine Clinic           ASSESSMENT and PLAN:     Nick was seen today for pain.    Diagnoses and all orders for this visit:    Trigger middle finger of left hand  Trigger finger of the left middle finger, interested in CSI today. We did discuss treatment options including CSI, referral to hand surgery for release and oval 8 splint. He would like to proceed with CSI today and tolerated the procedure well as below.   -     oval 8 splint, wear as much as possible for the next two weeks and then PRN after  -     Hand / Upper Extremity Injection: R long A1  -     follow up as needed, if no improvement with injection consider referral to hand surgery if patient is interested in release    Return sooner if develops new or worsening symptoms.    Options for treatment and/or follow-up care were reviewed with the patient was actively involved in the decision making process. Patient verbalized understanding and was in agreement with the plan.    Alley Coates MD, [facility]  Primary Care Sports Medicine             SUBJECTIVE       Nick Coley is a 63 year old male presenting to clinic today with a chief complaint of right hand, middle finger pain, referred by Dr. Vega. He is left hand dominant.     Onset: 1 month(s) ago. Reports insidious onset without acute precipitating event.  Location of Pain: Right middle finger  Rating of Pain at worst: 3/10  Rating of Pain Currently: 9/10  Worsened by: making a fist, use of right hand  Better with: Rest  Treatments tried: rest/activity avoidance  Associated symptoms: swelling  Orthopedic history: NO  Relevant surgical history: NO  Social history: social history: works at security office at [employer]    When  he first noticed it was having more triggering but now he has a hard time making a full fist because of pain at the volar side of his 3rd MCP. He has a tender nodule there as well. He is LHD and it is on his right hand. He has never had trigger finger before. No D2M.     PMH, Medications and Allergies were reviewed and updated as needed.    ROS:  As noted above otherwise negative.    Patient Active Problem List   Diagnosis     Snoring     Shifting sleep-work schedule     ANAM (obstructive sleep apnea)     Abnormal liver function     Calculus of kidney     Chronic tension-type headache, not intractable     Essential hypertension     History of colon polyps     Hyperlipidemia     Irritable bowel syndrome     Low back pain     Microscopic hematuria     Vitamin D deficiency     Nephrolithiasis     Moderate major depression (H)     Fatty liver     Morbid obesity (H)     History of gastric bypass     Numbness of left anterior thigh     Acute bilateral low back pain without sciatica     Chronic bilateral low back pain without sciatica     New onset headache       Current Outpatient Medications   Medication Sig Dispense Refill     calcium citrate-vitamin D (CITRACAL) 315-200 MG-UNIT TABS per tablet Take 2 tablets by mouth daily       citalopram (CELEXA) 20 MG tablet TAKE 1 TABLET(20 MG) BY MOUTH DAILY 90 tablet 0     lisinopril (ZESTRIL) 10 MG tablet Take 1 tablet (10 mg) by mouth daily 90 tablet 3     omeprazole (PRILOSEC) 20 MG DR capsule Take 1 capsule (20 mg) by mouth daily 90 capsule 3     rosuvastatin (CRESTOR) 10 MG tablet TAKE 1 TABLET(10 MG) BY MOUTH DAILY 90 tablet 3     UNABLE TO FIND MEDICATION NAME: Men One A Day Multivitamin       Cholecalciferol 125 MCG (5000 UT) TABS Take 125 mcg by mouth daily (Patient not taking: Reported on 10/10/2023)       cyanocobalamin 1000 MCG sublingual tablet Place 1 tablet (1,000 mcg) under the tongue daily (Patient not taking: Reported on 10/10/2023) 90 tablet 3     tiZANidine  (ZANAFLEX) 2 MG tablet Take 1 tablet (2 mg) by mouth 3 times daily as needed (back pain) (Patient not taking: Reported on 11/14/2022) 30 tablet 0            OBJECTIVE:       Vitals: There were no vitals filed for this visit.  BMI: There is no height or weight on file to calculate BMI.    Gen:  Well nourished and in no acute distress  HEENT: Extraocular movement intact  Pulm:  Breathing Comfortably. No increased respiratory effort.  Psych: Euthymic. Appropriately answers questions    MSK:   Right HAND  Inspection:    No swelling, bruising, discoloration. Right thumb shorter than right and index finger is not straight.   Palpation:   Fingers: triggering, A1 Pulley tender  Range of Motion:    Full extension but unable to fully flex without pain or triggering  Strength:    extension full, flexion limited substantially by pain  Special Tests:    Positive: palpable triggering of the third digit      Hand / Upper Extremity Injection: R long A1    Date/Time: 10/31/2023 3:32 PM    Performed by: Alley Coates MD  Authorized by: Alley Coates MD    Indications:  Pain  Needle Size:  25 G  Guidance: landmark    Approach:  Volar  Condition: trigger finger    Location:  Long finger    Site:  R long A1  Medications:  40 mg triamcinolone 40 MG/ML; 1 mL lidocaine (PF) 1 %  Outcome:  Tolerated well, no immediate complications  Procedure discussed: discussed risks, benefits, and alternatives    Consent Given by:  Patient  Timeout: timeout called immediately prior to procedure    Prep: patient was prepped and draped in usual sterile fashion            Again, thank you for allowing me to participate in the care of your patient.      Sincerely,    Alley Coates MD

## 2023-10-31 NOTE — NURSING NOTE
[address] [address]  Dept: [phone]  ______________________________________________________________________________    Patient: Nick Coley   : 1960   MRN: [id number]   2023    INVASIVE PROCEDURE SAFETY CHECKLIST    Date: 2023   Procedure:Right hand middle trigger finger  Patient Name: Nick Coley  MRN: [id number]  YOB: 1960    Action: Complete sections as appropriate. Any discrepancy results in a HARD COPY until resolved.     PRE PROCEDURE:  Patient ID verified with 2 identifiers (name and  or MRN): Yes  Procedure and site verified with patient/designee (when able): Yes  Accurate consent documentation in medical record: Yes  H&P (or appropriate assessment) documented in medical record: Yes  H&P must be up to 20 days prior to procedure and updates within 24 hours of procedure as applicable: NA  Relevant diagnostic and radiology test results appropriately labeled and displayed as applicable: Yes  Procedure site(s) marked with provider initials: NA    TIMEOUT:  Time-Out performed immediately prior to starting procedure, including verbal and active participation of all team members addressing the following:Yes  * Correct patient identify  * Confirmed that the correct side and site are marked  * An accurate procedure consent form  * Agreement on the procedure to be done  * Correct patient position  * Relevant images and results are properly labeled and appropriately displayed  * The need to administer antibiotics or fluids for irrigation purposes during the procedure as applicable   * Safety precautions based on patient history or medication use    DURING PROCEDURE: Verification of correct person, site, and procedures any time the responsibility for care of the patient is transferred to another member of the care team.       Prior to injection, verified patient identity using patient's name and date  of birth.  Due to injection administration, patient instructed to remain in clinic for 15 minutes  afterwards, and to report any adverse reaction to me immediately.    Trigger finger injection    Drug Amount Wasted:  Yes: 4 mg/ml   Vial/Syringe: Single dose vial  Expiration Date:  04/2027      Sehlley Alvarado, TOYA  October 31, 2023

## 2023-10-31 NOTE — PROGRESS NOTES
Sports Medicine Clinic           ASSESSMENT and PLAN:     Nick was seen today for pain.    Diagnoses and all orders for this visit:    Trigger middle finger of right hand  Trigger finger of the right middle finger, interested in CSI today. We did discuss treatment options including CSI, referral to hand surgery for release and oval 8 splint. He would like to proceed with CSI today and tolerated the procedure well as below.   -     oval 8 splint, wear as much as possible for the next two weeks and then PRN after  -     Hand / Upper Extremity Injection: R long A1  -     follow up as needed, if no improvement with injection consider referral to hand surgery if patient is interested in release    Return sooner if develops new or worsening symptoms.    Options for treatment and/or follow-up care were reviewed with the patient was actively involved in the decision making process. Patient verbalized understanding and was in agreement with the plan.    Alley Coates MD, [facility]  Primary Care Sports Medicine             SUBJECTIVE       Nick Coley is a 63 year old male presenting to clinic today with a chief complaint of right hand, middle finger pain, referred by Dr. Vega. He is left hand dominant.     Onset: 1 month(s) ago. Reports insidious onset without acute precipitating event.  Location of Pain: Right middle finger  Rating of Pain at worst: 3/10  Rating of Pain Currently: 9/10  Worsened by: making a fist, use of right hand  Better with: Rest  Treatments tried: rest/activity avoidance  Associated symptoms: swelling  Orthopedic history: NO  Relevant surgical history: NO  Social history: social history: works at security office at [employer]    When he first noticed it was having more triggering but now he has a hard time making a full fist because of pain at the volar side of his 3rd MCP. He has a tender nodule there as well. He is LHD and it is on his right hand. He has never had trigger finger  before. No D2M.     PMH, Medications and Allergies were reviewed and updated as needed.    ROS:  As noted above otherwise negative.    Patient Active Problem List   Diagnosis    Snoring    Shifting sleep-work schedule    ANAM (obstructive sleep apnea)    Abnormal liver function    Calculus of kidney    Chronic tension-type headache, not intractable    Essential hypertension    History of colon polyps    Hyperlipidemia    Irritable bowel syndrome    Low back pain    Microscopic hematuria    Vitamin D deficiency    Nephrolithiasis    Moderate major depression (H)    Fatty liver    Morbid obesity (H)    History of gastric bypass    Numbness of left anterior thigh    Acute bilateral low back pain without sciatica    Chronic bilateral low back pain without sciatica    New onset headache       Current Outpatient Medications   Medication Sig Dispense Refill    calcium citrate-vitamin D (CITRACAL) 315-200 MG-UNIT TABS per tablet Take 2 tablets by mouth daily      citalopram (CELEXA) 20 MG tablet TAKE 1 TABLET(20 MG) BY MOUTH DAILY 90 tablet 0    lisinopril (ZESTRIL) 10 MG tablet Take 1 tablet (10 mg) by mouth daily 90 tablet 3    omeprazole (PRILOSEC) 20 MG DR capsule Take 1 capsule (20 mg) by mouth daily 90 capsule 3    rosuvastatin (CRESTOR) 10 MG tablet TAKE 1 TABLET(10 MG) BY MOUTH DAILY 90 tablet 3    UNABLE TO FIND MEDICATION NAME: Men One A Day Multivitamin      Cholecalciferol 125 MCG (5000 UT) TABS Take 125 mcg by mouth daily (Patient not taking: Reported on 10/10/2023)      cyanocobalamin 1000 MCG sublingual tablet Place 1 tablet (1,000 mcg) under the tongue daily (Patient not taking: Reported on 10/10/2023) 90 tablet 3    tiZANidine (ZANAFLEX) 2 MG tablet Take 1 tablet (2 mg) by mouth 3 times daily as needed (back pain) (Patient not taking: Reported on 11/14/2022) 30 tablet 0            OBJECTIVE:       Vitals: There were no vitals filed for this visit.  BMI: There is no height or weight on file to calculate  BMI.    Gen:  Well nourished and in no acute distress  HEENT: Extraocular movement intact  Pulm:  Breathing Comfortably. No increased respiratory effort.  Psych: Euthymic. Appropriately answers questions    MSK:   Right HAND  Inspection:    No swelling, bruising, discoloration. Right thumb shorter than right and index finger is not straight.   Palpation:   Fingers: triggering, A1 Pulley tender  Range of Motion:    Full extension but unable to fully flex without pain or triggering  Strength:    extension full, flexion limited substantially by pain  Special Tests:    Positive: palpable triggering of the third digit      Hand / Upper Extremity Injection: R long A1    Date/Time: 10/31/2023 3:32 PM    Performed by: Alley Coates MD  Authorized by: Alley Coates MD    Indications:  Pain  Needle Size:  25 G  Guidance: landmark    Approach:  Volar  Condition: trigger finger    Location:  Long finger    Site:  R long A1  Medications:  40 mg triamcinolone 40 MG/ML; 1 mL lidocaine (PF) 1 %  Outcome:  Tolerated well, no immediate complications  Procedure discussed: discussed risks, benefits, and alternatives    Consent Given by:  Patient  Timeout: timeout called immediately prior to procedure    Prep: patient was prepped and draped in usual sterile fashion

## 2023-11-08 ENCOUNTER — PATIENT OUTREACH (OUTPATIENT)
Dept: GASTROENTEROLOGY | Facility: CLINIC | Age: 63
End: 2023-11-08
Payer: COMMERCIAL

## 2023-11-08 DIAGNOSIS — Z12.11 SPECIAL SCREENING FOR MALIGNANT NEOPLASMS, COLON: Primary | ICD-10-CM

## 2023-11-08 NOTE — PROGRESS NOTES
"Patients last colonoscopy was in 11/21/2018 and was recommended to repeat in 5 years. Patient now due and meets CRC criteria for standing order.    CRC Screening Colonoscopy Referral Review    Patient meets the inclusion criteria for screening colonoscopy standing order.    Ordering/Referring Provider:  Long Weston MD     BMI: Estimated body mass index is 43.07 kg/m  as calculated from the following:    Height as of 11/14/22: 1.721 m (5' 7.75\").    Weight as of 11/14/22: 127.6 kg (281 lb 3.2 oz).     Sedation:  Does patient have any of the following conditions affecting sedation?  No medical conditions affecting sedation.    Previous Scopes:  Any previous recommendations or follow up needs based on previous scope?  na / No recommendations.    Medical Concerns to Postpone Order:  Does patient have any of the following medical concerns that should postpone/delay colonoscopy referral?  No medical conditions affecting colonoscopy referral.    Final Referral Details:  Based on patient's medical history patient is appropriate for referral order with moderate sedation. If patient's BMI > 50 do not schedule in ASC.  "

## 2023-11-12 ENCOUNTER — MYC MEDICAL ADVICE (OUTPATIENT)
Dept: INTERNAL MEDICINE | Facility: CLINIC | Age: 63
End: 2023-11-12
Payer: COMMERCIAL

## 2023-11-12 DIAGNOSIS — R10.13 ABDOMINAL PAIN, EPIGASTRIC: ICD-10-CM

## 2023-11-12 DIAGNOSIS — R12 HEARTBURN: ICD-10-CM

## 2023-11-12 DIAGNOSIS — Z90.3 HISTORY OF SLEEVE GASTRECTOMY: ICD-10-CM

## 2023-11-13 NOTE — TELEPHONE ENCOUNTER
"Last Written Prescription Date:  12/5/22  Last Fill Quantity: 90,  # refills: 3   Last office visit provider:  6/7/23  Prescription approved per [facility] Refill Protocol.      Requested Prescriptions   Pending Prescriptions Disp Refills    omeprazole (PRILOSEC) 20 MG DR capsule 90 capsule 3     Sig: Take 1 capsule (20 mg) by mouth daily       PPI Protocol Passed - 11/13/2023 12:28 PM        Passed - Not on Clopidogrel (unless Pantoprazole ordered)        Passed - No diagnosis of osteoporosis on record        Passed - Recent (12 mo) or future (30 days) visit within the authorizing provider's specialty     Patient has had an office visit with the authorizing provider or a provider within the authorizing providers department within the previous 12 mos or has a future within next 30 days. See \"Patient Info\" tab in inbasket, or \"Choose Columns\" in Meds & Orders section of the refill encounter.              Passed - Medication is active on med list        Passed - Patient is age 18 or older             Shirlene Macias RN 11/13/23 12:29 PM  "

## 2023-12-21 ENCOUNTER — NURSE TRIAGE (OUTPATIENT)
Dept: INTERNAL MEDICINE | Facility: CLINIC | Age: 63
End: 2023-12-21
Payer: COMMERCIAL

## 2023-12-21 NOTE — TELEPHONE ENCOUNTER
COVID Positive/Requesting COVID treatment    Patient is positive for COVID and requesting treatment options.    Date of positive COVID test (PCR or at home)?  Home 12/20/2023    Current COVID symptoms: fatigue, headache, and congestion or runny nose    Date COVID symptoms began: 12/20/23    Message should be routed to clinic RN pool. Best phone number to use for call back: [phone]

## 2023-12-21 NOTE — TELEPHONE ENCOUNTER
Reason for Disposition   [1] COVID-19 diagnosed by positive lab test (e.g., PCR, rapid self-test kit) AND [2] mild symptoms (e.g., cough, fever, others) AND [3] no complications or SOB    Additional Information   Negative: SEVERE difficulty breathing (e.g., struggling for each breath, speaks in single words)   Negative: Difficult to awaken or acting confused (e.g., disoriented, slurred speech)   Negative: Bluish (or gray) lips or face now   Negative: Shock suspected (e.g., cold/pale/clammy skin, too weak to stand, low BP, rapid pulse)   Negative: Sounds like a life-threatening emergency to the triager   Negative: [1] Diagnosed or suspected COVID-19 AND [2] symptoms lasting 3 or more weeks   Negative: [1] COVID-19 exposure AND [2] no symptoms   Negative: COVID-19 vaccine reaction suspected (e.g., fever, headache, muscle aches) occurring 1 to 3 days after getting vaccine   Negative: COVID-19 vaccine, questions about   Negative: [1] Lives with someone known to have influenza (flu test positive) AND [2] flu-like symptoms (e.g., cough, runny nose, sore throat, SOB; with or without fever)   Negative: [1] Possible COVID-19 symptoms AND [2] triager concerned about severity of symptoms or other causes   Negative: COVID-19 and breastfeeding, questions about   Negative: SEVERE or constant chest pain or pressure  (Exception: Mild central chest pain, present only when coughing.)   Negative: MODERATE difficulty breathing (e.g., speaks in phrases, SOB even at rest, pulse 100-120)   Negative: [1] Headache AND [2] stiff neck (can't touch chin to chest)   Negative: Oxygen level (e.g., pulse oximetry) 90 percent or lower   Negative: Chest pain or pressure  (Exception: MILD central chest pain, present only when coughing.)   Negative: [1] Drinking very little AND [2] dehydration suspected (e.g., no urine > 12 hours, very dry mouth, very lightheaded)   Negative: Patient sounds very sick or weak to the triager   Negative: MILD difficulty  breathing (e.g., minimal/no SOB at rest, SOB with walking, pulse <100)   Negative: Fever > 103 F (39.4 C)   Negative: [1] Fever > 101 F (38.3 C) AND [2] age > 60 years   Negative: [1] Fever > 100.0 F (37.8 C) AND [2] bedridden (e.g., CVA, chronic illness, recovering from surgery)   Negative: Oxygen level (e.g., pulse oximetry) 91 to 94 percent   Negative: [1] HIGH RISK patient (e.g., weak immune system, age > 64 years, obesity with BMI 30 or higher, pregnant, chronic lung disease or other chronic medical condition) AND [2] COVID symptoms (e.g., cough, fever)  (Exceptions: Already seen by PCP and no new or worsening symptoms.)   Negative: [1] HIGH RISK patient AND [2] influenza is widespread in the community AND [3] ONE OR MORE respiratory symptoms: cough, sore throat, runny or stuffy nose   Negative: [1] HIGH RISK patient AND [2] influenza exposure within the last 7 days AND [3] ONE OR MORE respiratory symptoms: cough, sore throat, runny or stuffy nose   Negative: Fever present > 3 days (72 hours)   Negative: [1] Fever returns after gone for over 24 hours AND [2] symptoms worse or not improved   Negative: [1] Continuous (nonstop) coughing interferes with work or school AND [2] no improvement using cough treatment per Care Advice   Negative: [1] COVID-19 infection suspected by caller or triager AND [2] mild symptoms (cough, fever, or others) AND [3] negative COVID-19 rapid test   Negative: Cough present > 3 weeks   Negative: [1] COVID-19 diagnosed by positive lab test (e.g., PCR, rapid self-test kit) AND [2] NO symptoms (e.g., cough, fever, others)    Protocols used: Coronavirus (COVID-19) Diagnosed or [id number]    First triage did not go through.     Ronald ZULUAGA RN

## 2023-12-21 NOTE — TELEPHONE ENCOUNTER
Nurse Triage SBAR    Is this a 2nd Level Triage? NO    Situation:  Covid positive 12/19     Background: Incoming call from pt requesting paxlovid.     Writer outgoing call to pt to triage.     Assessment:     Symptoms started 12/15, tested positibe 12/19.   Pt report feeling fatigue, headache at times, and congestion or runny nose   Pt report minimal cough.   Pt is using Tylenol as needed.     Denies sob, chest pain, or respiratory distress.     Protocol Recommended Disposition:   Home Care    Recommendation:   Since symptoms started 5 days ago, writer unable to prescribe symptoms.   Red flags reviewed with pt.  Care advise provided.   No further questions.     Please review triage.     Ronald ZULUAGA RN

## 2024-01-06 DIAGNOSIS — F32.1 MODERATE MAJOR DEPRESSION (H): ICD-10-CM

## 2024-01-08 RX ORDER — CITALOPRAM HYDROBROMIDE 20 MG/1
TABLET ORAL
Qty: 90 TABLET | Refills: 0 | Status: SHIPPED | OUTPATIENT
Start: 2024-01-08 | End: 2024-01-19

## 2024-01-10 ENCOUNTER — MYC REFILL (OUTPATIENT)
Dept: INTERNAL MEDICINE | Facility: CLINIC | Age: 64
End: 2024-01-10
Payer: COMMERCIAL

## 2024-01-10 DIAGNOSIS — I10 ESSENTIAL HYPERTENSION: ICD-10-CM

## 2024-01-11 RX ORDER — LISINOPRIL 10 MG/1
10 TABLET ORAL DAILY
Qty: 90 TABLET | Refills: 0 | Status: SHIPPED | OUTPATIENT
Start: 2024-01-11 | End: 2024-01-19

## 2024-01-12 ASSESSMENT — ENCOUNTER SYMPTOMS
CONSTIPATION: 0
HEMATURIA: 0
SHORTNESS OF BREATH: 0
HEADACHES: 0
ARTHRALGIAS: 0
WEAKNESS: 0
FEVER: 0
HEARTBURN: 0
DIZZINESS: 0
CHILLS: 0
COUGH: 0
PARESTHESIAS: 0
NERVOUS/ANXIOUS: 0
DYSURIA: 0
JOINT SWELLING: 0
ABDOMINAL PAIN: 0
DIARRHEA: 0
MYALGIAS: 0
PALPITATIONS: 0
NAUSEA: 0
EYE PAIN: 0
SORE THROAT: 0
FREQUENCY: 0
HEMATOCHEZIA: 0

## 2024-01-18 ASSESSMENT — PATIENT HEALTH QUESTIONNAIRE - PHQ9
10. IF YOU CHECKED OFF ANY PROBLEMS, HOW DIFFICULT HAVE THESE PROBLEMS MADE IT FOR YOU TO DO YOUR WORK, TAKE CARE OF THINGS AT HOME, OR GET ALONG WITH OTHER PEOPLE: NOT DIFFICULT AT ALL
SUM OF ALL RESPONSES TO PHQ QUESTIONS 1-9: 0
SUM OF ALL RESPONSES TO PHQ QUESTIONS 1-9: 0

## 2024-01-19 ENCOUNTER — OFFICE VISIT (OUTPATIENT)
Dept: INTERNAL MEDICINE | Facility: CLINIC | Age: 64
End: 2024-01-19
Payer: COMMERCIAL

## 2024-01-19 VITALS
TEMPERATURE: 98.4 F | BODY MASS INDEX: 45.47 KG/M2 | RESPIRATION RATE: 18 BRPM | HEART RATE: 66 BPM | OXYGEN SATURATION: 97 % | DIASTOLIC BLOOD PRESSURE: 68 MMHG | SYSTOLIC BLOOD PRESSURE: 139 MMHG | WEIGHT: 300 LBS | HEIGHT: 68 IN

## 2024-01-19 DIAGNOSIS — Z00.00 ROUTINE GENERAL MEDICAL EXAMINATION AT A HEALTH CARE FACILITY: Primary | ICD-10-CM

## 2024-01-19 DIAGNOSIS — I10 ESSENTIAL HYPERTENSION: ICD-10-CM

## 2024-01-19 DIAGNOSIS — E66.01 MORBID OBESITY (H): ICD-10-CM

## 2024-01-19 DIAGNOSIS — Z86.0100 HISTORY OF COLON POLYPS: ICD-10-CM

## 2024-01-19 DIAGNOSIS — E55.9 VITAMIN D DEFICIENCY: ICD-10-CM

## 2024-01-19 DIAGNOSIS — N20.0 NEPHROLITHIASIS: ICD-10-CM

## 2024-01-19 DIAGNOSIS — G47.33 OSA (OBSTRUCTIVE SLEEP APNEA): ICD-10-CM

## 2024-01-19 DIAGNOSIS — E78.2 MIXED HYPERLIPIDEMIA: ICD-10-CM

## 2024-01-19 DIAGNOSIS — Z98.84 HISTORY OF GASTRIC BYPASS: ICD-10-CM

## 2024-01-19 DIAGNOSIS — F33.42 RECURRENT MAJOR DEPRESSIVE DISORDER, IN FULL REMISSION (H): ICD-10-CM

## 2024-01-19 PROBLEM — G44.229 CHRONIC TENSION-TYPE HEADACHE, NOT INTRACTABLE: Status: RESOLVED | Noted: 2019-06-10 | Resolved: 2024-01-19

## 2024-01-19 PROBLEM — R51.9 NEW ONSET HEADACHE: Status: RESOLVED | Noted: 2022-11-14 | Resolved: 2024-01-19

## 2024-01-19 PROBLEM — M54.50 LOW BACK PAIN: Status: RESOLVED | Noted: 2020-12-07 | Resolved: 2024-01-19

## 2024-01-19 PROBLEM — M54.50 ACUTE BILATERAL LOW BACK PAIN WITHOUT SCIATICA: Status: RESOLVED | Noted: 2022-05-02 | Resolved: 2024-01-19

## 2024-01-19 PROBLEM — R06.83 SNORING: Status: RESOLVED | Noted: 2020-10-28 | Resolved: 2024-01-19

## 2024-01-19 LAB
ALBUMIN UR-MCNC: NEGATIVE MG/DL
APPEARANCE UR: CLEAR
BILIRUB UR QL STRIP: NEGATIVE
COLOR UR AUTO: YELLOW
ERYTHROCYTE [DISTWIDTH] IN BLOOD BY AUTOMATED COUNT: 12.9 % (ref 10–15)
FOLATE SERPL-MCNC: 17.3 NG/ML (ref 4.6–34.8)
GLUCOSE UR STRIP-MCNC: NEGATIVE MG/DL
HBA1C MFR BLD: 5.4 % (ref 0–5.6)
HCT VFR BLD AUTO: 43.3 % (ref 40–53)
HGB BLD-MCNC: 14.6 G/DL (ref 13.3–17.7)
HGB UR QL STRIP: NEGATIVE
KETONES UR STRIP-MCNC: ABNORMAL MG/DL
LEUKOCYTE ESTERASE UR QL STRIP: NEGATIVE
MCH RBC QN AUTO: 29.1 PG (ref 26.5–33)
MCHC RBC AUTO-ENTMCNC: 33.7 G/DL (ref 31.5–36.5)
MCV RBC AUTO: 86 FL (ref 78–100)
NITRATE UR QL: NEGATIVE
PH UR STRIP: 5.5 [PH] (ref 5–8)
PLATELET # BLD AUTO: 190 10E3/UL (ref 150–450)
PTH-INTACT SERPL-MCNC: 32 PG/ML (ref 15–65)
RBC # BLD AUTO: 5.01 10E6/UL (ref 4.4–5.9)
SP GR UR STRIP: >=1.03 (ref 1–1.03)
UROBILINOGEN UR STRIP-ACNC: 0.2 E.U./DL
WBC # BLD AUTO: 6.1 10E3/UL (ref 4–11)

## 2024-01-19 PROCEDURE — 84443 ASSAY THYROID STIM HORMONE: CPT | Performed by: INTERNAL MEDICINE

## 2024-01-19 PROCEDURE — 99214 OFFICE O/P EST MOD 30 MIN: CPT | Mod: 25 | Performed by: INTERNAL MEDICINE

## 2024-01-19 PROCEDURE — 99000 SPECIMEN HANDLING OFFICE-LAB: CPT | Performed by: INTERNAL MEDICINE

## 2024-01-19 PROCEDURE — 83036 HEMOGLOBIN GLYCOSYLATED A1C: CPT | Performed by: INTERNAL MEDICINE

## 2024-01-19 PROCEDURE — 82607 VITAMIN B-12: CPT | Performed by: INTERNAL MEDICINE

## 2024-01-19 PROCEDURE — 82746 ASSAY OF FOLIC ACID SERUM: CPT | Performed by: INTERNAL MEDICINE

## 2024-01-19 PROCEDURE — 80061 LIPID PANEL: CPT | Performed by: INTERNAL MEDICINE

## 2024-01-19 PROCEDURE — 36415 COLL VENOUS BLD VENIPUNCTURE: CPT | Performed by: INTERNAL MEDICINE

## 2024-01-19 PROCEDURE — 83970 ASSAY OF PARATHORMONE: CPT | Performed by: INTERNAL MEDICINE

## 2024-01-19 PROCEDURE — G0103 PSA SCREENING: HCPCS | Performed by: INTERNAL MEDICINE

## 2024-01-19 PROCEDURE — 80053 COMPREHEN METABOLIC PANEL: CPT | Performed by: INTERNAL MEDICINE

## 2024-01-19 PROCEDURE — 90471 IMMUNIZATION ADMIN: CPT | Performed by: INTERNAL MEDICINE

## 2024-01-19 PROCEDURE — 91320 SARSCV2 VAC 30MCG TRS-SUC IM: CPT | Performed by: INTERNAL MEDICINE

## 2024-01-19 PROCEDURE — 84590 ASSAY OF VITAMIN A: CPT | Mod: 90 | Performed by: INTERNAL MEDICINE

## 2024-01-19 PROCEDURE — 82728 ASSAY OF FERRITIN: CPT | Performed by: INTERNAL MEDICINE

## 2024-01-19 PROCEDURE — 85027 COMPLETE CBC AUTOMATED: CPT | Performed by: INTERNAL MEDICINE

## 2024-01-19 PROCEDURE — 99396 PREV VISIT EST AGE 40-64: CPT | Mod: 25 | Performed by: INTERNAL MEDICINE

## 2024-01-19 PROCEDURE — 84425 ASSAY OF VITAMIN B-1: CPT | Mod: 90 | Performed by: INTERNAL MEDICINE

## 2024-01-19 PROCEDURE — 90682 RIV4 VACC RECOMBINANT DNA IM: CPT | Performed by: INTERNAL MEDICINE

## 2024-01-19 PROCEDURE — 90480 ADMN SARSCOV2 VAC 1/ONLY CMP: CPT | Performed by: INTERNAL MEDICINE

## 2024-01-19 PROCEDURE — 81003 URINALYSIS AUTO W/O SCOPE: CPT | Performed by: INTERNAL MEDICINE

## 2024-01-19 RX ORDER — CITALOPRAM HYDROBROMIDE 20 MG/1
20 TABLET ORAL DAILY
Qty: 90 TABLET | Refills: 3 | Status: SHIPPED | OUTPATIENT
Start: 2024-01-19

## 2024-01-19 RX ORDER — LISINOPRIL AND HYDROCHLOROTHIAZIDE 20; 25 MG/1; MG/1
1 TABLET ORAL DAILY
COMMUNITY
End: 2024-01-19

## 2024-01-19 RX ORDER — LISINOPRIL 10 MG/1
10 TABLET ORAL DAILY
Qty: 90 TABLET | Refills: 3 | Status: SHIPPED | OUTPATIENT
Start: 2024-01-19 | End: 2024-07-15

## 2024-01-19 RX ORDER — ROSUVASTATIN CALCIUM 10 MG/1
10 TABLET, COATED ORAL DAILY
Qty: 90 TABLET | Refills: 3 | Status: SHIPPED | OUTPATIENT
Start: 2024-01-19

## 2024-01-19 ASSESSMENT — ENCOUNTER SYMPTOMS
DYSURIA: 0
DIZZINESS: 0
PALPITATIONS: 0
JOINT SWELLING: 0
FEVER: 0
ABDOMINAL PAIN: 0
DIARRHEA: 0
COUGH: 0
CONSTIPATION: 0
EYE PAIN: 0
SORE THROAT: 0
ARTHRALGIAS: 0
WEAKNESS: 0
FREQUENCY: 0
HEMATURIA: 0
HEADACHES: 0
SHORTNESS OF BREATH: 0
NERVOUS/ANXIOUS: 0
MYALGIAS: 0
CHILLS: 0
NAUSEA: 0

## 2024-01-19 NOTE — PROGRESS NOTES
Preventive Care Visit  [facility]  Long Weston MD, Internal Medicine  2024      SUBJECTIVE:   Nick is a 64 year old, presenting for the followin-year-old male here for his annual physical and to follow-up medical problems including morbid obesity with history of gastric bypass surgery, hypertension, history of depression and sleep apnea and other concerns.  See assessment and plan for details.  Physical (Fasting - No concerns)        2024     3:57 PM   Additional Questions   Roomed by Morelia BELTRAN     Healthy Habits:     Getting at least 3 servings of Calcium per day:  Yes    Bi-annual eye exam:  Yes    Dental care twice a year:  NO    Sleep apnea or symptoms of sleep apnea:  Sleep apnea    Diet:  Regular (no restrictions)    Frequency of exercise:  2-3 days/week    Duration of exercise:  15-30 minutes    Taking medications regularly:  Yes    Medication side effects:  None    Additional concerns today:  No    Today's PHQ-9 Score:       2024     4:44 PM   PHQ-9 SCORE   PHQ-9 Total Score MyChart 0   PHQ-9 Total Score 0                 Have you ever done Advance Care Planning? (For example, a Health Directive, POLST, or a discussion with a medical provider or your loved ones about your wishes): No, advance care planning information given to patient to review.  Advanced care planning was discussed at today's visit.    Social History     Tobacco Use    Smoking status: Never     Passive exposure: Never    Smokeless tobacco: Never    Tobacco comments:     cigar once or twice a year   Substance Use Topics    Alcohol use: Not Currently     Comment: infrequent             2024     8:59 AM   Alcohol Use   Prescreen: >3 drinks/day or >7 drinks/week? Not Applicable       Last PSA:   Prostate Specific Antigen Screen   Date Value Ref Range Status   2022 0.63 0.00 - 4.50 ng/mL Final   2021 0.69 0.00 - 3.50 ug/L Final       Reviewed orders with patient. Reviewed  health maintenance and updated orders accordingly - Yes  Lab work is in process    Reviewed and updated as needed this visit by clinical staff   Tobacco  Allergies  Meds              Reviewed and updated as needed this visit by Provider                  Past Medical History:   Diagnosis Date    Abnormal LFTs (liver function tests) 12/18/2015    Negative hepatitis C antibody and ferritin of 330 6 December 2015, steatohepatitis suspected    Acute bilateral low back pain without sciatica 05/02/2022    Acute blood loss anemia 03/18/2021    WHIT (acute kidney injury) (H24) 03/18/2021    Benign essential hypertension     Cellulitis of left lower extremity 05/29/2020    Cellulitis, face 2014    Chronic bilateral low back pain without sciatica 11/14/2022    Chronic kidney disease     Chronic tension-type headache, not intractable 06/10/2019    Depression with anxiety     Essential hypertension 12/18/2015    Fatty liver     ultrasound 2001 demonstrated.    Hematoma of leg, left, subsequent encounter 07/23/2018    History of colon polyps     Colonoscopy November 2018 with multiple polyps removed, repeat in 3 years    History of gastric bypass     Normal sleeve gastrectomy June 2021    Hyperlipidemia     Irritable bowel syndrome     Kidney stones     x 4    LBP (low back pain)     Low back pain     Formatting of this note might be different from the original.  Replacement Utility updated for latest IMO load    Microscopic hematuria 07/23/2018    Moderate major depression (H) 06/10/2019    Morbid obesity (H) 12/18/2015    Normal sleeve gastrectomy June 2021    Nephrolithiasis 12/2020    Right    Nephrolithiasis     2013, 2009, 2001.  Right percutaneous nephrolithotomy March 2021.  Stone analysis 90% uric acid and 10% calcium oxalate    New onset headache 11/14/2022    Numbness of left anterior thigh 11/09/2021    Numbness and burning left anterior thigh above his knee    ANAM (obstructive sleep apnea) 01/2021    Severe     Sleep apnea     uses cpap    Suspected sleep apnea 11/16/2018    Tear of meniscus of right knee 12/18/2015    Associated with right tibial plateau fracture 2013, arthroscopic knee surgery December 2015    Vitamin D deficiency     Vitamin D 10.0 November 2018    Wrist fracture       Past Surgical History:   Procedure Laterality Date    CARPAL TUNNEL RELEASE RT/LT Right 2001    compound fracture Right     age14, right arm    EXTRACORPOREAL SHOCK WAVE LITHOTRIPSY  2013    for kidney stones    HC KNEE SCOPE,SINGLE MENISECTOMY Right 12/22/2015    Procedure: RIGHT KNEE ARTHROSCOPIC MENISCECTOMY;  Surgeon: Chandu Moyer MD;  Location: [facility];  Service: Orthopedics    KIDNEY STONE SURGERY Right     Right percutaneous nephrolithotomy March 2021    OTHER SURGICAL HISTORY      arm fracture repair    CO LAP, CHIDI RESTRICT PROC, LONGITUDINAL GASTRECTOMY N/A 6/15/2021    Procedure: GASTRECTOMY, SLEEVE, LAPAROSCOPIC;  Surgeon: Aubrey Olmos MD;  Location: [facility];  Service: General    RELEASE CARPAL TUNNEL Right 2001    WISDOM TOOTH EXTRACTION  2013     Review of Systems   Constitutional:  Negative for chills and fever.   HENT:  Negative for congestion, ear pain, hearing loss and sore throat.    Eyes:  Negative for pain and visual disturbance.   Respiratory:  Negative for cough and shortness of breath.    Cardiovascular:  Negative for chest pain and palpitations.   Gastrointestinal:  Negative for abdominal pain, constipation, diarrhea and nausea.   Genitourinary:  Negative for dysuria, frequency, genital sores, hematuria, penile discharge and urgency.   Musculoskeletal:  Negative for arthralgias, joint swelling and myalgias.   Skin:  Negative for rash.   Neurological:  Negative for dizziness, weakness and headaches.   Psychiatric/Behavioral:  The patient is not nervous/anxious.          OBJECTIVE:   /68 (BP Location: Right arm, Patient Position: Sitting, Cuff Size: Adult Regular)   Pulse 66    "Temp 98.4  F (36.9  C)   Resp 18   Ht 1.721 m (5' 7.75\")   Wt 136.1 kg (300 lb)   SpO2 97%   BMI 45.95 kg/m     Estimated body mass index is 45.95 kg/m  as calculated from the following:    Height as of this encounter: 1.721 m (5' 7.75\").    Weight as of this encounter: 136.1 kg (300 lb).  Physical Exam  EYES: Eyelids, conjunctiva, and sclera were normal. Pupils were normal. Cornea, iris, and lens were normal bilaterally.  HEAD, EARS, NOSE, MOUTH, AND THROAT: Head and face were normal. TMs and external auditory canals are normal.  Oropharynx normal  NECK: Neck appearance was normal. There were no neck masses and the thyroid was not enlarged and no nodules are felt.  No lymphadenopathy.  RESPIRATORY: Breathing pattern was normal and the chest moved symmetrically.   Lung sounds were normal and there were no rales or wheezes.  CARDIOVASCULAR: Heart rate and rhythm were normal.  S1 and S2 were normal and there were no extra sounds or murmurs. Peripheral pulses in arms and legs were normal.  There was no peripheral edema.  No carotid bruits.  GASTROINTESTINAL:  Bowel sounds were present.   Palpation detected no tenderness, mass, or enlarged organs.   RECTAL/PROSTATE: Deferred  MUSCULOSKELETAL: Skeletal configuration was normal and muscle mass was normal for age. Joint appearance was overall normal.  LYMPHATIC: There were no enlarged nodes.  SKIN/HAIR/NAILS: Skin color was normal.  There were no concerning skin lesions.  NEUROLOGIC: The patient was alert and oriented to person, place, time, and circumstance. Speech was normal. Cranial nerves were normal. Motor strength was normal for age. The patient was normally coordinated.  Sensation intact.  PSYCHIATRIC:  Mood and affect were normal and the patient had normal recent and remote memory. The patient's judgment and insight were normal.  PHQ-9 score is 0        ASSESSMENT/PLAN:   1. Routine general medical examination at a health care facility  Immunizations are " reviewed and providing COVID booster and flu shot today.  Recommending RSV vaccine this winter.  He should also consider getting a shingles vaccine, Shingrix..  Living will discussed.  Information provided.  Non-smoker.  Uses alcohol in moderation.  Regular exercise and healthy diet habits to maintain a healthy weight discussed.  Overdue for a screening colonoscopy after multiple polyps removed during his last procedure and I am asking him to contact [facility] to get this scheduled ASAP.  Prostate exam is deferred but I will check a PSA for prostate cancer screening.   He sees his ophthalmologist every year.  Regular dental visits every 6 months discussed.  Skin exam performed and recommending regular use of sunblock.  We discussed seeing a dermatologist every 1 to 2 years.  Hepatitis C antibody for screening was normal.  Will screen for diabetes with fasting glucose.    - PSA, screen; Future  - PSA, screen    2. Morbid obesity (H)  History of gastric bypass surgery in 2021.  Initially with excellent response with his weight down to 270 pounds but unfortunately he has gained back much of the weight over the past 2 years and is at 300 pounds today.  He understands that changes are needed and he is motivated to start working better on his diet and also try to get more regular exercise.  I reminded him that he was able to get his weight down to 270 before and there is no reason he cannot do this again.    3. History of gastric bypass  He has not seen the bariatric clinic for over 1 year and I will order appropriate follow-up studies that are being monitored.  He does continue to take B12 replacement along with multivitamin, vitamin D3 and calcium.  - Zinc; Future  - Vitamin B12; Future  - Vitamin B1 whole blood; Future  - Vitamin A; Future  - Parathyroid Hormone Intact; Future  - Hemoglobin A1c; Future  - Folate; Future  - Ferritin; Future  - Zinc  - Vitamin B12  - Vitamin B1 whole blood  - Vitamin  A  - Parathyroid Hormone Intact  - Hemoglobin A1c  - Folate  - Ferritin    4. Essential hypertension  Blood pressure in the high normal range.  Continue lisinopril 10 mg daily but may need increase of dose if blood pressure does not come under 130/80.  I suspect his blood pressure will improve if he is able to get his weight back down to previous levels.  He should continue sodium restriction as well.  Checking appropriate metabolic studies  - CBC with platelets; Future  - Comprehensive metabolic panel (BMP + Alb, Alk Phos, ALT, AST, Total. Bili, TP); Future  - UA Macroscopic with reflex to Microscopic and Culture - Lab Collect; Future  - TSH with free T4 reflex; Future  - lisinopril (ZESTRIL) 10 MG tablet; Take 1 tablet (10 mg) by mouth daily  Dispense: 90 tablet; Refill: 3  - CBC with platelets  - Comprehensive metabolic panel (BMP + Alb, Alk Phos, ALT, AST, Total. Bili, TP)  - UA Macroscopic with reflex to Microscopic and Culture - Lab Collect  - TSH with free T4 reflex    5. Recurrent major depressive disorder, in full remission (H24)  His mood seems to be doing fine with the current dose of citalopram with PHQ-9 score of 0  - citalopram (CELEXA) 20 MG tablet; Take 1 tablet (20 mg) by mouth daily  Dispense: 90 tablet; Refill: 3    6. ANAM (obstructive sleep apnea)  He has not been using CPAP since his gastric bypass but with his weight gain, he may need to reconsider restarting.  If his weight does not improve, I would like him to follow-up at the sleep clinic    7. Mixed hyperlipidemia  Recheck lipid profile taking rosuvastatin.  Tolerating medication without side effect.  - Lipid Profile (Chol, Trig, HDL, LDL calc); Future  - rosuvastatin (CRESTOR) 10 MG tablet; Take 1 tablet (10 mg) by mouth daily  Dispense: 90 tablet; Refill: 3  - Lipid Profile (Chol, Trig, HDL, LDL calc)    8. Nephrolithiasis  Maintaining good hydration to avoid any recurrent kidney stones    9. Vitamin D deficiency  Rechecking vitamin D  "level on replacement    10. History of colon polyps  Multiple polyps removed at his last colonoscopy and he is overdue to get this repeated.  I am encouraging him to call [facility] to get this scheduled ASAP    Patient has been advised of split billing requirements and indicates understanding: Yes        BMI  Estimated body mass index is 45.95 kg/m  as calculated from the following:    Height as of this encounter: 1.721 m (5' 7.75\").    Weight as of this encounter: 136.1 kg (300 lb).         He reports that he has never smoked. He has never been exposed to tobacco smoke. He has never used smokeless tobacco.            Signed Electronically by: Long Weston MD  Answers submitted by the patient for this visit:  Patient Health Questionnaire (Submitted on 1/18/2024)  If you checked off any problems, how difficult have these problems made it for you to do your work, take care of things at home, or get along with other people?: Not difficult at all  PHQ9 TOTAL SCORE: 0  Annual Preventive Visit (Submitted on 1/12/2024)  Chief Complaint: Annual Exam:  Blood in stool: No  heartburn: No  peripheral edema: No  mood changes: No  Skin sensation changes: No  impotence: No    "

## 2024-01-20 LAB
ALBUMIN SERPL BCG-MCNC: 4.2 G/DL (ref 3.5–5.2)
ALP SERPL-CCNC: 61 U/L (ref 40–150)
ALT SERPL W P-5'-P-CCNC: 22 U/L (ref 0–70)
ANION GAP SERPL CALCULATED.3IONS-SCNC: 8 MMOL/L (ref 7–15)
AST SERPL W P-5'-P-CCNC: 34 U/L (ref 0–45)
BILIRUB SERPL-MCNC: 0.6 MG/DL
BUN SERPL-MCNC: 13.5 MG/DL (ref 8–23)
CALCIUM SERPL-MCNC: 9.2 MG/DL (ref 8.8–10.2)
CHLORIDE SERPL-SCNC: 107 MMOL/L (ref 98–107)
CHOLEST SERPL-MCNC: 191 MG/DL
CREAT SERPL-MCNC: 0.92 MG/DL (ref 0.67–1.17)
DEPRECATED HCO3 PLAS-SCNC: 24 MMOL/L (ref 22–29)
EGFRCR SERPLBLD CKD-EPI 2021: >90 ML/MIN/1.73M2
FASTING STATUS PATIENT QL REPORTED: ABNORMAL
FERRITIN SERPL-MCNC: 55 NG/ML (ref 31–409)
GLUCOSE SERPL-MCNC: 86 MG/DL (ref 70–99)
HDLC SERPL-MCNC: 41 MG/DL
LDLC SERPL CALC-MCNC: 118 MG/DL
NONHDLC SERPL-MCNC: 150 MG/DL
POTASSIUM SERPL-SCNC: 4.1 MMOL/L (ref 3.4–5.3)
PROT SERPL-MCNC: 6.8 G/DL (ref 6.4–8.3)
PSA SERPL DL<=0.01 NG/ML-MCNC: 0.58 NG/ML (ref 0–4.5)
SODIUM SERPL-SCNC: 139 MMOL/L (ref 135–145)
TRIGL SERPL-MCNC: 161 MG/DL
TSH SERPL DL<=0.005 MIU/L-ACNC: 1.63 UIU/ML (ref 0.3–4.2)
VIT B12 SERPL-MCNC: 465 PG/ML (ref 232–1245)

## 2024-01-24 LAB
ANNOTATION COMMENT IMP: NORMAL
RETINYL PALMITATE SERPL-MCNC: 0.03 MG/L
VIT A SERPL-MCNC: 0.68 MG/L

## 2024-01-25 LAB — VIT B1 PYROPHOSHATE BLD-SCNC: 163 NMOL/L

## 2024-02-06 ENCOUNTER — E-VISIT (OUTPATIENT)
Dept: INTERNAL MEDICINE | Facility: CLINIC | Age: 64
End: 2024-02-06
Payer: COMMERCIAL

## 2024-02-06 DIAGNOSIS — J06.9 UPPER RESPIRATORY TRACT INFECTION, UNSPECIFIED TYPE: Primary | ICD-10-CM

## 2024-02-06 PROCEDURE — 99421 OL DIG E/M SVC 5-10 MIN: CPT | Performed by: INTERNAL MEDICINE

## 2024-02-06 RX ORDER — AZITHROMYCIN 250 MG/1
TABLET, FILM COATED ORAL
Qty: 6 TABLET | Refills: 0 | Status: SHIPPED | OUTPATIENT
Start: 2024-02-06 | End: 2024-02-11

## 2024-02-06 NOTE — PATIENT INSTRUCTIONS
Hi Nick.      It sounds like your symptoms have only been present for a few days.  This is unlikely to be a sinus infection developing that soon.  More likely you have a viral upper respiratory infection.  Make sure you do a home test for COVID.    Assuming that is negative, I would treat your symptoms with acetaminophen, Mucinex and Delsym cough syrup.    I will send a Z-Mikael to your pharmacy as requested but I would only start taking this if your symptoms are not resolving beyond the next week.  At that point a sinus infection becomes a more likely consideration.    Long Weston MD  Internal Medicine  [facility]

## 2024-03-26 NOTE — PROGRESS NOTES
Nephrology progress note    Alicia Ambriz Patient Status:  Inpatient    1958 MRN [id number]   Location [facility]  321 SURG INTENSIVE CARE Attending Katina Herman MD   Hosp Day # 42 PCP Gordy Pearson MD     Subjective: NAEO.  Remains on ventilatory support.  On pressor support to keep MAP more than 65 mmHg.     Home Medications:  Medications Prior to Admission   Medication Sig Dispense Refill    carvedilol (COREG) 25 MG tablet TAKE 1 TABLET BY MOUTH IN THE MORNING AND IN THE EVENING WITH MEALS 180 tablet 3    cyclophosphamide 50 MG capsule Take 1 capsule by mouth daily. Do not start before 2024. 30 capsule 3    potassium chloride (KLOR-CON) 8 MEQ ER tablet TAKE 1 TABLET BY MOUTH DAILY 30 tablet 3    prochlorperazine (COMPAZINE) 10 MG tablet Take 1 tablet by mouth every 6 hours as needed for Nausea or Vomiting. 30 tablet 5    LORazepam (ATIVAN) 0.5 MG tablet Take 2 tablets by mouth every 6 hours as needed for Anxiety. 20 tablet 0    gabapentin (NEURONTIN) 100 MG capsule Take 1 capsule by mouth in the morning and 1 capsule at noon and 1 capsule in the evening. 90 capsule 3    chlorthalidone (THALITONE) 25 MG tablet Take 25 mg by mouth daily.      amLODIPine (NORVASC) 10 MG tablet Take 1 tablet by mouth daily. 30 tablet 5    atorvastatin (LIPITOR) 20 MG tablet Take 1 tablet by mouth nightly. 90 tablet 3    hydrALAZINE (APRESOLINE) 25 MG tablet Take 3 tablets by mouth in the morning and 3 tablets at noon and 3 tablets in the evening. 90 tablet 3    clotrimazole (MYCELEX) 10 MG ron Take 1 Ron by mouth 5 times daily. 25 Ron 1    Wheat Dextrin (Benefiber) Powder Take 4 g by mouth 3 times daily. 245 g 0    sennosides-docusate sodium (SENOKOT-S) 8.6-50 MG tablet Take 1 tablet by mouth daily. 30 tablet 3    polyethylene glycol (MIRALAX) 17 g packet Take 17 g by mouth daily. Stir and dissolve powder in any 4 to 8 ounces of beverage, then drink. 30 packet 3     Patient attended virtual group class to review pre-op instructions for upcoming surgery.    Discussed admission process, COVID restrictions and hospital course.  Pharmacy information packet given and explained. Patient was given exercises to work on post-op for maintaining muscle mass and strengthening.  Bariatric quiz given to pt for review on their own. Discharge instructions and follow up appointments given and reviewed with pt at this time and patient verbalized understanding. He will have his H&P at [facility] with  on 6/4/2021 and do his pre-op testing at that visit.  Prescriptions for Omeprazole and Actigall sent to the patient's pharmacy to be started after surgery.      Hannah Davis RN, CBN  [facility]  P [phone]  F [phone]     acetaminophen (TYLENOL) 500 MG tablet Take 2 tablets by mouth every 6 hours. (Patient taking differently: Take 1,000 mg by mouth every 6 hours as needed for Fever or Pain.) 60 tablet 0    docusate sodium-sennosides (SENOKOT S) 50-8.6 MG per tablet Take 2 tablets by mouth 2 times daily as needed for Constipation. 40 tablet 3    ondansetron (ZOFRAN) 8 MG tablet Take 8 mg by mouth every 8 hours as needed for Nausea.      Coenzyme Q10 (COQ-10 PO) Take 1 Dose by mouth daily.         INPATIENT MEDS:  Current Facility-Administered Medications   Medication Dose Route Frequency Provider Last Rate Last Admin    potassium CHLORIDE 20 mEq/100mL IVPB premix  20 mEq Intravenous Once Reece Osborn MD 50 mL/hr at 03/26/24 0841 20 mEq at 03/26/24 0841    QUEtiapine (SEROquel) tablet 200 mg  200 mg Per NG Tube 3 times per day Louis Mason MD   200 mg at 03/26/24 0852    acetaminophen (TYLENOL) 160 MG/5ML solution 650 mg  650 mg Per NG Tube Q6H Louis Mason MD        chlorthalidone (THALITONE) tablet 25 mg  25 mg Oral Daily Ashu Espinosa MD        hydrALAZINE (APRESOLINE) tablet 75 mg  75 mg Per NG Tube Q8H Ashu Espinosa MD        furosemide (LASIX INJECT) injection 40 mg  40 mg Intravenous BID Ashu Espinosa MD   40 mg at 03/26/24 0852    carvedilol (COREG) tablet 12.5 mg  12.5 mg Per NG Tube 2 times per day Louis Mason MD   12.5 mg at 03/25/24 2020    cefepime (MAXIPIME) 1,000 mg in sodium chloride 0.9 % 100 mL IVPB  1,000 mg Intravenous Daily Quoc Brown MD   Completed at 03/25/24 2230    fat emul fish oil/plant based 20% (SMOFLIPID) 250 mL infusion  250 mL Intravenous Q24H Nabeel Campos Y 20.8 mL/hr at 03/25/24 2146 250 mL at 03/25/24 2146    Potassium Replacement (Levels 3.6 - 4)   Does not apply See Admin Instructions Reece Osborn MD        Magnesium Standard Replacement Protocol   Does not apply See Admin Instructions Reece Osborn MD        Potassium Standard Replacement Protocol  (Levels 3.5 and lower)   Does not apply See Admin Instructions Reece Osborn MD        Phosphorus Standard Replacement Protocol   Does not apply See Admin Instructions Reece Osborn MD        PARENTERAL NUTRITION - DIETITIAN/PHARMACIST MANAGED   Does not apply See Admin Instructions Nabeel Campos        gabapentin (NEURONTIN) 250 MG/5ML solution 200 mg  200 mg Per NG Tube 2 times per day Louis Maosn MD   200 mg at 03/26/24 0852    chlorhexidine gluconate (PERIDEX) 0.12 % solution 15 mL  15 mL Swish & Spit 2 times per day Tho Naik MD   15 mL at 03/26/24 0852    pantoprazole (PROTONIX INJECT) injection 40 mg  40 mg Intravenous 2 times per day Tho Naik MD   40 mg at 03/26/24 0852    heparin (porcine) injection 5,000 Units  5,000 Units Subcutaneous 3 times per day Tho Naik MD   5,000 Units at 03/26/24 0612    ipratropium-albuterol (DUONEB) 0.5-2.5 (3) MG/3ML nebulizer solution 3 mL  3 mL Nebulization Q6H Resp Ferguson-Garibay, Fredis   3 mL at 03/26/24 0800       Review of Systems   Unable to perform ROS: Intubated   Constitutional:  Positive for fatigue.   HENT:  Positive for congestion.    Eyes: Negative.    Respiratory:  Positive for shortness of breath.    Cardiovascular:  Negative for chest pain and leg swelling.   Gastrointestinal: Negative.    Genitourinary: Negative.    Musculoskeletal: Negative.    Neurological: Negative.    Psychiatric/Behavioral: Negative.         Vitals:  Temp:  [97.9 °F (36.6 °C)-99 °F (37.2 °C)] 98.6 °F (37 °C)  Heart Rate:  [] 81  Resp:  [16-28] 25  BP: ()/() 153/74  Arterial Line BP: (107-225)/() 215/100  FiO2 (%):  [40 %] 40 %     Physical Exam  Constitutional:       General: She is not in acute distress.     Appearance: She is obese. She is ill-appearing.      Comments: Intubated on ventilatory support   HENT:      Head: Normocephalic and atraumatic.      Right Ear: External ear normal.      Left Ear: External ear normal.       Mouth/Throat:      Comments: ET tube+     Neck: Neck supple.   Eyes:      Conjunctiva/sclera: Conjunctivae normal.   Cardiovascular:      Rate and Rhythm: Tachycardia present.      Pulses: Normal pulses.   Pulmonary:      Effort: No respiratory distress.      Breath sounds: Normal breath sounds. No wheezing.   Abdominal:      General: Bowel sounds are normal. There is distension.      Palpations: Abdomen is soft.      Tenderness: There is no abdominal tenderness. There is no guarding or rebound.      Comments: Dressing and Drains   Musculoskeletal:      Right lower leg: Edema present.      Left lower leg: Edema present.   Neurological:      Comments: Sedated on ventilator   Psychiatric:      Comments: Sedated on ventilator         Laboratory Data:   No results displayed because visit has over 200 results.          Imaging:  XR CHEST AP OR PA   Final Result by Arthur Salinas MD (03/26 1177)   FINDINGS AND IMPRESSION:      Tracheostomy device, right-sided chest port, enteric tubes and left   temporary hemodialysis catheter in place. The tip of the hemodialysis   catheter projects at the confluence of the brachiocephalic veins. Low lung   volumes. Heart size prominent unchanged. Patchy airspace opacities   throughout the right lung and left lung base redemonstrated increased in   the interim. Coarse interstitial markings and architectural distortion left   apex. Pleural thickening and blunting of the left costophrenic angle. No   pneumothorax.            Electronically Signed by: ARTHUR SALINAS M.D.    Signed on: 3/26/2024 6:40 AM    Workstation ID: [id number]      XR CHEST AP OR PA   Final Result by Rach Cuadra MD (03/25 2610)   FINDINGS/IMPRESSION:      Tracheostomy.      Prominent bronchovascular and interstitial markings with bilateral lung   opacities and left effusion relatively unchanged.      No pneumothorax.       Electronically Signed by: RACH CUADRA M.D.    Signed on: 3/25/2024 8:25 AM     Workstation ID: [id number]      XR ABDOMEN 1 VIEW   Final Result by Victorino Salgado MD (03/24 1011)   FINDINGS/IMPRESSION: NG tube is present with sidehole located within the   gastric body and tip located in the region of the cardia/body junction.            Electronically Signed by: VICTORINO SALGADO MD    Signed on: 3/24/2024 10:11 AM    Workstation ID: [id number]      XR CHEST AP OR PA   Final Result by Victorino Salgado MD (03/24 0934)      Overall stable examination.               VICTORINO CHI MD, have reviewed the images and report and concur with   these findings interpreted by SAM CUNHA DO.      Electronically Signed by: VICTORINO SALGADO MD    Signed on: 3/24/2024 9:34 AM    Workstation ID: [id number]      XR CHEST AP OR PA   Final Result by Rach Cuadra MD (03/23 1035)   FINDINGS/IMPRESSION:      Tracheostomy. Left jugular line with tip in superior vena cava. Right   Port-A-Cath with tip at superior cavoatrial junction.      Prominent appearing heart size. Coarse irregular prominence of   bronchovascular and interstitial markings with scattered ill-defined   opacities. Bilateral effusions. Slight improvement compared to prior study.   Slightly improved inspiratory effort.       Electronically Signed by: RACH CUADRA M.D.    Signed on: 3/23/2024 10:35 AM    Workstation ID: [id number]      XR CHEST AP OR PA   Final Result by Rach Cuadra MD (03/22 0383)   FINDINGS/IMPRESSION:      Tracheostomy. Tip of NG tube in area of cardia of stomach.      Coarse prominence of bronchovascular and interstitial markings with patchy   bilateral lung opacities and bilateral effusions slightly worsened.   Inspiration slightly less optimal.      No pneumothorax.       Electronically Signed by: RACH CUADRA M.D.    Signed on: 3/22/2024 7:54 AM    Workstation ID: [id number]      CT PERITONEAL DRAINAGE CATHETER PLACEMENT   Final Result by Nuzhat Bartlett MD (03/25 6382)    Successful CT-guided abscess drainage catheter placement into   left lower quadrant peritoneal abscess.      PLAN: Maintain catheter to bulb suction. Flush with 10 mL normal saline   twice a day.   ___________________________________________________________________________   _____________________________________      PROCEDURES PERFORMED:   Abdominal abscess catheter placement, CT guidance      ANESTHESIA/SEDATION INTRA-SERVICE TIME: 25 minutes      PROPHYLACTIC ANTIBIOTIC: None      PREPARATION: The site was prepared and draped and all elements of maximal   sterile barrier technique including sterile gloves, sterile gown, mask,   large sterile sheet, hand hygiene and cutaneous antisepsis with 2%   chlorhexidine +70% isopropyl alcohol were used.      PROCEDURE:   Verbal and written consent was obtained. The patient was placed supine upon   the CT table, and limited noncontrast scans of the and abdomen were   performed, notifying the left lower quadrant peritoneal collection.   Radiopaque grid markers were placed, and the area rescanned, identifying an   appropriate skin access site. The skin site was marked, and the area   prepped and draped in standard sterile fashion. All elements of maximum   sterile barrier were maintained throughout the procedure. A \"timeout\" was   called prior to initiating the procedure. And angiography was present to   monitor the patient's cardiopulmonary status and provide intravenous   conscious sedation under my direct supervision. Please refer to nurses   notes for sedation details. 1% lidocaine was used for local anesthesia. A 5   Montenegrin Yueh needle was guided into the abdominal collection under CT   guidance. Once needle position was confirmed, aspiration was performed,   yielding straw-colored turbid fluid. A fluid specimen was collected and   sent for microbiology evaluation. Next, a 035 guidewire was placed, after   which the wire tract dilated to 8 Montenegrin. An 8 Montenegrin locking  all-purpose   drainage catheter was then advanced over the guidewire and coiled within   the collection where the catheter retaining loop was formed and locked. The   catheter was then secured to the patient's skin using a 2-0 silk suture and   connected to bulb suction. The patient tolerated the procedure well. There   were no immediate complications.      FINDINGS:    Limited noncontrast scans of the abdomen demonstrate a loculated appearing   collection within the left peritoneal cavity. Following abscess drainage   cath placement, the catheter> within the central aspect of the collection.      COMPLICATIONS: None      SPECIMENS REMOVED: 10 mL plus      ESTIMATED BLOOD LOSS: Minimal               Electronically Signed by: RIGOBERTO STEVENS MD    Signed on: 3/25/2024 4:22 PM    Workstation ID: [id number]      XR CHEST AP OR PA   Final Result by Rach Cuadra MD (03/21 0844)      CT ABDOMEN PELVIS WO CONTRAST   Final Result by Victorino Salgado MD (03/20 5096)   No definite bowel communication to the left anterior abdominal fluid   collection. Please note that recently administered contrast has not passed   into the distal small bowel therefore delayed scan can be done for further   evaluation if concern persists.            Electronically Signed by: VICTORINO SALGADO MD    Signed on: 3/20/2024 11:55 AM    Workstation ID: [id number]      XR CHEST AP OR PA   Final Result by Rach Cuadra MD (03/20 0811)   FINDINGS/IMPRESSION:      Tracheostomy. Tip of NG tube in area of proximal stomach.      Mild prominence of bronchovascular and interstitial markings with scattered   ill-defined opacities bilateral upper and lower lung fields and bilateral   effusions. No significant change compared to prior study.       Electronically Signed by: RACH CUADRA M.D.    Signed on: 3/20/2024 8:11 AM    Workstation ID: [id number]      XR CHEST AP OR PA   Final Result by Rach Cuadra MD (03/19 0907)    FINDINGS/IMPRESSION:      Tracheostomy. Tip of NG tube presumably in area mid body of stomach.      Prominent bronchovascular and interstitial markings with bilateral lung   opacities slightly worsened. Left effusion and thickening minor fissure   relatively unchanged. Inspiration less optimal.       Electronically Signed by: ALINE CUADRA M.D.    Signed on: 3/19/2024 9:07 AM    Workstation ID: [id number]      CT ABDOMEN PELVIS WO CONTRAST   Final Result by Monique Jeffery MD (03/19 0949)   Impression:      1. Persistent dilatation of small bowel loops up to 4 cm with relative   transition in the right lower abdomen, may be suggestive of a partially   obstructive process or ileus; previously administered enteric contrast is   present in the colon.   2. New encapsulated collection throughout the left abdomen concerning for   an abscess measuring 4.0 x 9.5 x 14.8 cm.   3. Decreased size of right abdominal fluid and gas collection which has a   drainage catheter in place.   4. Persistent small volume ascites and small volume pneumoperitoneum.   5. Unchanged moderate right hydroureteronephrosis to the pelvis, may be   concerning for malignant ureteral obstruction.   6. New mild dilatation of the left renal collecting system, could be   physiologic given moderate bladder distention or concerning for developing   left ureteral obstruction.   7. Small amount of air in the urinary bladder, clinical correlation for   urinary tract infection or recent instrumentation is suggested.   8. Small subcutaneous upper abdominal collection is slightly increased, the   component more inferiorly has decreased compared to 2/23/2024.   9. Slight increase in small bilateral pleural effusions and bibasilar   consolidation/atelectasis.      Electronically Signed by: MONIQUE JEFFERY M.D.    Signed on: 3/19/2024 9:49 AM    Workstation ID: [id number]      XR CHEST AP OR PA   Final Result by Aline Cuadra MD (03/18 4397)    FINDINGS/IMPRESSION:      Tracheostomy.      Tip of NG tube in area mid body of stomach.      Bilateral lung opacities and bilateral effusions greater on the left   relatively unchanged.       Electronically Signed by: RACH CUADRA M.D.    Signed on: 3/18/2024 8:21 AM    Workstation ID: [id number]      XR CHEST AP OR PA   Final Result by Lazara Encarnacion MD (03/17 1141)   FINDINGS AND IMPRESSION:       Interval removal of endotracheal tube and placement of tracheostomy   cannula. Left internal jugular catheter tip over the superior vena cava.   Enteric tube tip over the stomach. Right chest port catheter tip over the   cavoatrial junction.      Cardiomediastinal silhouette is normal in size and contour. Stable probable   lateral loculated left pleural effusion. Slight interval improved airspace   opacity in the left mid and lower lung and in the right lower lung. No   detectable pneumothorax.         Electronically Signed by: LAZARA ENCARNACION M.D.    Signed on: 3/17/2024 11:41 AM    Workstation ID: [id number]      XR CHEST AP OR PA   Final Result by Tracy Mcgee MD (03/17 1031)   DESCRIPTION/IMPRESSION:   Transesophageal tube tip in the stomach. Portacatheter tip in the SVC. Left   IJV central catheter tip at the junction of left and right brachiocephalic   veins.      Significant hypoinflation. Ill-defined streaky subsegmental parenchymal   airspace opacities and consolidations in bilateral lungs are unchanged.      No pneumothorax. Pleural thickening/pleural fluid around the left lung is   unchanged.      Partially imaged is percutaneous pigtail catheter in the right abdomen.      Electronically Signed by: TRACY MCGEE M.D.    Signed on: 3/17/2024 10:31 AM    Workstation ID: [id number]      XR CHEST AP OR PA   Final Result by Rach Cuadra MD (03/16 1037)   FINDINGS/IMPRESSION:      Endotracheal tube 3 cm above the lazaro. Feeding tube overlies left upper   quadrant with  tip not included in field-of-view.      Prominent bronchovascular and interstitial markings with cephalization of   vessels. Bilateral lung opacities. Left effusion. No significant change   compared to prior study.      No pneumothorax.       Electronically Signed by: RACH CUADRA M.D.    Signed on: 3/16/2024 10:37 AM    Workstation ID: [id number]      XR CHEST AP OR PA   Final Result by Rafat Gordon DO (03/15 0645)   Endotracheal tube, right-sided chest port and left central vein catheter   are in stable position. Patchy ill-defined airspace opacities throughout   the left lung with layering left-sided pleural effusion. Patchy opacities   throughout the right lung appear slightly worsened with some fluid along   the fissure and right pleural effusion present. No appreciable   pneumothorax. Continued follow-up advised..      Electronically Signed by: RAFAT GORDON D.O.    Signed on: 3/15/2024 6:45 AM    Workstation ID: [id number]      XR CHEST AP OR PA   Final Result by Arthur Wong MD (03/14 7376)   FINDINGS AND IMPRESSION:      Endotracheal and feeding tubes, left hemodialysis catheter right-sided   chest port in place. Nondetectable pneumothorax. Pleural thickening or   fluid left lung with proximal ectasis or infiltrate left lung base   improved.               Electronically Signed by: ARTHUR WONG M.D.    Signed on: 3/14/2024 5:05 AM    Workstation ID: [id number]      XR CHEST AP OR PA   Final Result by Victorino Ramírez MD (03/13 1923)   FINDINGS AND IMPRESSION: Endotracheal and feeding tube, left hemodialysis   catheter and right-sided chest port are unchanged. Interval removal of   right chest tube. No pneumothorax. Redemonstrated left-sided pleural   thickening and fluid with perihilar and basilar streaky opacities.   Redemonstrated prominent heart size.               Dictated by: CASSIDY CASTANEDA MD   Dictated on: 3/13/2024 7:00 PM          VICTORINO CHI MD, have reviewed the  images and report and concur with   these findings interpreted by CASSIDY CASTANEDA MD.      Electronically Signed by: JUSTIN SALGADO MD    Signed on: 3/13/2024 7:23 PM    Workstation ID: [id number]      XR CHEST AP OR PA   Final Result by Arthur Wong MD (03/13 0637)   FINDINGS AND IMPRESSION:      Endotracheal and feeding tubes, right-sided chest port and chest tube and   left hemodialysis catheter unchanged. Low lung volumes. Heart size   prominent unchanged. Left-sided pleural thickening and fluid. Perihilar and   basilar streaky opacities. No detectable pneumothorax.            Electronically Signed by: ARTHUR WONG M.D.    Signed on: 3/13/2024 6:37 AM    Workstation ID: [id number]      XR CHEST AP OR PA   Final Result by Arthur Wong MD (03/12 0710)   FINDINGS AND IMPRESSION:      Endotracheal and feeding tubes, left hemodialysis catheter, right-sided   chest port and chest tubes unchanged. Low lung volumes. Heart size   prominent unchanged. Reticular opacities throughout both lungs slightly   improved on the current study. No pneumothorax. Pleural thickening and or   loculated fluid left pneumothorax. Patchy electives infiltrate left lung   base.         I, ARTHUR WONG M.D., have reviewed the images and report and concur with   these findings interpreted by SAM CUNHA DO.      Electronically Signed by: ARTHUR WONG M.D.    Signed on: 3/12/2024 7:10 AM    Workstation ID: [id number]      XR CHEST AP OR PA   Final Result by Arthur Wong MD (03/11 0718)   FINDINGS AND IMPRESSION:      Endotracheal and feeding tubes in place with distal tip of feeding tube not   visualized. Left hemodialysis catheter unchanged. Right-sided chest port   and chest tubes in place. No pneumothorax. Low lung volumes. Heart size   prominent. Patchy interstitial airspace opacities bilaterally with pleural   thickening or fluid on the left redemonstrated.         Electronically Signed by: ARTHUR WONG M.D.    Signed on:  3/11/2024 7:18 AM    Workstation ID: [id number]      XR CHEST AP OR PA   Final Result by Rach Cuadra MD (03/10 1015)   FINDINGS/IMPRESSION:      Endotracheal tube 2 cm above the lazaro. Left central line with tip in   upper right atrium.. Prominent heart size. Prominent bronchovascular and   interstitial markings with diffuse bilateral lung opacities improved   compared to prior study. Left effusion tracking laterally to level of left   upper lung field laterally relatively unchanged.       Electronically Signed by: RACH CUADRA M.D.    Signed on: 3/10/2024 10:15 AM    Workstation ID: [id number]      XR CHEST AP OR PA   Final Result by Rach Cuadra MD (03/09 1003)   FINDINGS/IMPRESSION:      Endotracheal tube 2 cm above the lazaro.      Diffuse bilateral airspace opacities with prominence of bronchovascular and   interstitial markings and suggestion of left effusion unchanged from prior   study.       Electronically Signed by: RACH CUADRA M.D.    Signed on: 3/9/2024 10:03 AM    Workstation ID: [id number]      XR CHEST AP OR PA   Final Result by Breanna Valentin MD (03/08 8489)   1.   Worsening bilateral patchy opacities, especially in the lower lung   fields. These could represent atelectasis, developing infectious process,   or pulmonary edema.         Dictated by: KI SARKAR MD   Dictated on: 3/8/2024 7:18 AM          IBREANNA MD, have reviewed the images and report and concur   with these findings interpreted by KI SARKAR MD.      Electronically Signed by: BREANNA VALENTIN MD    Signed on: 3/8/2024 9:23 AM    Workstation ID: [id number]      XR CHEST AP OR PA   Final Result by Apollo Wong MD (03/07 3776)   FINDINGS AND IMPRESSION:      Endotracheal and feeding tubes, left-sided hemodialysis catheter,   right-sided chest port and chest tubes unchanged. Low lung volumes. Heart   size prominent stable. Patchy streaky opacities lung bases  greater on the   left than the right likely atelectasis. Infiltrate left base not excluded.   Pleural thickening and fluid on the left unchanged. Coarse interstitial   patchy opacities bilaterally redemonstrated. No detectable pneumothorax.         ARTHUR CHI M.D., have reviewed the images and report and concur with   these findings interpreted by SAM CUNHA DO.      Electronically Signed by: ARTHUR WONG M.D.    Signed on: 3/7/2024 5:50 AM    Workstation ID: [id number]      XR CHEST AP OR PA   Final Result by Arthur Wong MD (03/06 7937)   FINDINGS AND IMPRESSION:      Endotracheal and feeding tubes, left hemodialysis catheter, right-sided   chest tube and chest port in place. Low lung volumes. Heart size stable.   Streaky patchy opacities left lung base. Pleural thickening or fluid on the   left unchanged. Few patchy reticular opacities right lung. No pneumothorax.         Electronically Signed by: ARTHUR WONG M.D.    Signed on: 3/6/2024 7:52 AM    Workstation ID: [id number]      XR CHEST AP OR PA   Final Result by Rach Cuadra MD (03/05 3)   FINDINGS/IMPRESSION:      1225 hours      New left jugular line with tip in area superior cavoatrial junction or   upper right atrium.      No pneumothorax.      Endotracheal tube 3 cm above the lazaro. Right chest tube overlies right   upper lung field also seen on prior study.       Electronically Signed by: RACH CUADRA M.D.    Signed on: 3/5/2024 2:13 PM    Workstation ID: [id number]      XR CHEST AP OR PA   Final Result by Arthur Wong MD (03/05 0662)      1.   Improving aeration of the lungs.   2.   Small volume left pleural effusion with adjacent atelectasis,   decreased when compared to prior.   3.   Right pleural drainage catheter is unchanged. No detectable   pneumothorax.   4.   Support devices as above.               ARTHUR CHI M.D., have reviewed the images and report and concur with   these findings interpreted by SAM  DO ALPHONSE.      Electronically Signed by: ARTHUR WONG M.D.    Signed on: 3/5/2024 5:49 AM    Workstation ID: [id number]      XR ABDOMEN 1 VIEW   Final Result by Monique Jeffery MD (03/04 3430)      Dobbhoff tube tip in the stomach.               Electronically Signed by: MONIQUE JEFFERY M.D.    Signed on: 3/4/2024 6:04 PM    Workstation ID: [id number]      XR CHEST AP OR PA   Final Result by Rach Cuadra MD (03/04 2653)   FINDINGS/IMPRESSION:      3/4/2024 1240 hours      Pigtail catheter overlying right lung base unchanged.      Pigtail catheter overlying lateral right midlung field is new.      Improvement right pneumothorax. On current study pneumothorax measures 1.7   cm at the apex and about 0.3 cm lateral aspect right upper lung field.   Pneumothorax lateral right midlung field may measure up to about 0.6 cm in   width.      Bilateral lung opacities. Opacities on the left are worsened. Possible new   shift of cardiac silhouette to the left versus significant difference in   patient positioning. Patient is significantly rotated on current study.   Follow-up films recommended.       Electronically Signed by: RACH CUADRA M.D.    Signed on: 3/4/2024 1:02 PM    Workstation ID: [id number]      XR CHEST AP OR PA   Final Result by Rach Cuadra MD (03/04 3538)      XR CHEST AP OR PA   Final Result by Arthur Wong MD (03/04 9305)   FINDINGS AND IMPRESSION:      Endotracheal tube tip projects 1.2 cm above the lazaro. Enteric tube   traverses the diaphragm with tip not included in view. Stable position of   left-sided central venous catheter with tip projecting over the distal   superior vena cava. Right chest port with catheter tip at the cavoatrial   junction. Right-sided pigtail catheter projects over the right lung base.      Stable small to moderate-sized right-sided pneumothorax. Ill-defined,   patchy opacities throughout the lungs are unchanged. Small bilateral   pleural  effusions, with some loculated fluid along the left lateral chest.         I, ARTHUR SALINAS M.D., have reviewed the images and report and concur with   these findings interpreted by KRISSY ADLER D.O..      Electronically Signed by: ARTHUR SALINAS M.D.    Signed on: 3/4/2024 5:13 AM    Workstation ID: [id number]      XR CHEST AP OR PA   Final Result by Samy Ford MD (03/03 1344)   FINDINGS AND IMPRESSION: An endotracheal tube, feeding tube, portacatheter,   left central venous catheter and right pigtail catheter again noted.   Persistent right pneumothorax, slightly decreased in size. Prominent   cardiac silhouette. Small bilateral pleural effusions with bibasilar   consolidation. Hazy opacity throughout the right lung may represent   expansion atelectasis.      Electronically Signed by: SAMY FORD M.D.    Signed on: 3/3/2024 1:44 PM    Workstation ID: [id number]      XR CHEST AP OR PA   Final Result by Samy Ford MD (03/03 9745)   FINDINGS AND IMPRESSION: An endotracheal tube, right portacatheter, feeding   tube, right pigtail catheter and left central venous catheter again noted.   Persistent right pneumothorax, slightly increased since the prior   examination, which may be secondary to positioning. Bilateral pleural   effusions persist. Hazy opacity throughout both lungs may represent   multifocal pneumonia.      Electronically Signed by: SAMY FORD M.D.    Signed on: 3/3/2024 12:52 PM    Workstation ID: [id number]      XR ABDOMEN 1 VIEW   Final Result by Rach Cuadra MD (03/02 6433)   FINDINGS/IMPRESSION:      Metallic skin staples at midline.      Tip of NG tube in area of proximal stomach.      The dilated loops of small bowel demonstrated in the right mid abdomen on   prior study are no longer demonstrated. On current examination there is   small air in transverse colon measuring 4.3 cm in width.            Electronically Signed by: RACH CUADRA M.D.    Signed on:  3/2/2024 9:33 AM    Workstation ID: [id number]      XR CHEST AP OR PA   Final Result by Rach Cuadra MD (03/02 0859)   FINDINGS/IMPRESSION:      3/2/2024 0333 hours      Pigtail catheter overlies right lung base also seen on prior study.      Endotracheal tube 2 cm above the lazaro.      Bilateral lung opacities relatively unchanged.      Right pneumothorax located in right upper lung field and measures   approximate 1.3 cm medially, 2.7 cm at the apex and 0.8 cm laterally   relatively unchanged compared to prior study.       Electronically Signed by: RACH CUADRA M.D.    Signed on: 3/2/2024 8:59 AM    Workstation ID: [id number]      XR CHEST AP OR PA   Final Result by Tracy Lara MD (03/01 2209)   DESCRIPTION/IMPRESSION:   1. No significant change in size of right apical pneumothorax measuring   around 3 cm. A right percutaneous pleural drainage catheter at the right   base is unchanged in position.   2. The airspace opacity in the right upper lobe is possibly atelectasis,   unchanged.   3. Ill-defined streaky subsegmental parenchymal opacities in bilateral   lungs unchanged.   4. Again noted pleural thickening around the left lung.   5. ET tube tip is 2.5 cm above the lazaro. Transesophageal tube tip   directed into the stomach. Right portacatheter tip in the SVC. Left IJV   central tip in the SVC.      Electronically Signed by: TRACY LARA M.D.    Signed on: 3/1/2024 10:09 PM    Workstation ID: [id number]      XR CHEST AP OR PA   Final Result by Tracy Lara MD (03/01 1814)   DESCRIPTION/IMPRESSION:   1. New right sided pneumothorax measuring up to 3.1 cm at the right apex. A   right-sided percutaneous pleural drainage catheter is unchanged at the   right base.   2. Airspace opacity in the right upper lobe is possibly atelectasis due to   the new pneumothorax.   3. Ill-defined streaky parenchymal airspace opacities elsewhere bilateral   lungs are grossly unchanged.   4.  Pleural thickening around the left lung appears unchanged.   5. ET tube tip is in a good position 2.5 cm above the lazaro. Portacatheter   tip in the SVC. Transesophageal tube directed into the stomach.      Case verbally discussed with Dr. Caity Lal on 3/1/2024 at 6:10 p.m.      Electronically Signed by: ARPITA LARA M.D.    Signed on: 3/1/2024 6:14 PM    Workstation ID: [id number]      XR CHEST AP OR PA   Final Result by Arthur Wong MD (03/01 0649)   *   Slightly increased airspace opacity in bilateral lung bases compared to   prior.   *   Stable small right and small to moderate left pleural effusions.   *   Stable appearance of support devices as above.      Dictated by: KAYCEE MARTIN MD   Dictated on: 3/1/2024 6:40 AM          ARTHUR CHI M.D., have reviewed the images and report and concur with   these findings interpreted by KAYCEE MARTIN MD.      Electronically Signed by: ARTHUR WONG M.D.    Signed on: 3/1/2024 6:49 AM    Workstation ID: [id number]      CT HEAD WO CONTRAST   Final Result by Ifrah Burnett MD (02/29 2123)      *   No acute intracranial abnormality.   *   Partial opacification of the bilateral mastoid air cells. No definite   adjacent temporal bone fracture. Correlate clinically for mastoiditis.         Noncontrast enhanced CT is a screening survey. Conditions such as vascular   malformations, aneurysms, low grade, tumors, meningitis, subtle   subarachnoid hemorrhages, etc., may not be demonstrated. Followup studies   as clinically indicated may be necessary.         Dictated by: KAYCEE MARTIN MD   Dictated on: 2/29/2024 9:01 PM          IFRAH CHI M.D., have reviewed the images and report and concur with   these findings interpreted by KAYCEE MARTIN MD.      Electronically Signed by: IFRAH BURNETT M.D.    Signed on: 2/29/2024 9:23 PM    Workstation ID: [id number]      XR CHEST AP OR PA   Final Result by Arthur Wong MD (02/29 0743)      1.   Right basilar  pigtail chest tube is unchanged. No detectable   pneumothorax.   2.   Right infrahilar airspace opacity is improved.   3.   Left lung opacities are similar to prior.   4.   Stable bilateral pleural effusions.               ARTHUR CHI M.D., have reviewed the images and report and concur with   these findings interpreted by SAM CUNHA DO.      Electronically Signed by: ARTHUR WONG M.D.    Signed on: 2/29/2024 7:43 AM    Workstation ID: [id number]      XR CHEST AP OR PA   Final Result by Arthur Wong MD (02/28 7664)      1.   Increasing airspace opacities in the lung bases.   2.   Stable small to moderate volume left pleural effusion.   3.   Support devices are unchanged.                     ARTHUR CHI M.D., have reviewed the images and report and concur with   these findings interpreted by SAM CUNHA DO.      Electronically Signed by: ARTHUR WONG M.D.    Signed on: 2/28/2024 5:46 AM    Workstation ID: [id number]      XR CHEST AP OR PA   Final Result by Arthur Wong MD (02/27 2537)      1.   Previously seen right pneumothorax is not evident on this examination.   2.   Increasing airspace opacities in the lung bases.   3.   Stable small to moderate volume left pleural effusion.   4.   Support devices as above.               Dictated by: SAM CUNHA DO   Dictated on: 2/27/2024 4:29 AM          ARTHUR CHI M.D., have reviewed the images and report and concur with   these findings interpreted by SAM CUNHA DO.      Electronically Signed by: ARTHUR WONG M.D.    Signed on: 2/27/2024 5:01 AM    Workstation ID: [id number]      CT HEAD WO CONTRAST   Final Result by Victorino Ramírez MD (02/26 2016)   1.   No acute intracranial process.    2.   No large vessel occlusion or stenosis. Probable infundibulum at the   left posterior communicating artery origin.            Dictated by: NELLY MARROQUIN MD   Dictated on: 2/26/2024 5:04 PM          VICTORINO CHI MD, have reviewed the images and report and concur with    these findings interpreted by NELLY MARROQUIN MD.      Electronically Signed by: VICTORINO SALGADO MD    Signed on: 2/26/2024 5:22 PM    Workstation ID: [id number]      CTA HEAD   Final Result by Victorino Salgado MD (02/26 1722)   1.   No acute intracranial process.    2.   No large vessel occlusion or stenosis. Probable infundibulum at the   left posterior communicating artery origin.            Dictated by: NELLY MARROQUIN MD   Dictated on: 2/26/2024 5:04 PM          I, VICTORINO SALGADO MD, have reviewed the images and report and concur with   these findings interpreted by NELLY MARROQUIN MD.      Electronically Signed by: VICTORINO SALGADO MD    Signed on: 2/26/2024 5:22 PM    Workstation ID: [id number]      XR CHEST AP OR PA   Final Result by Arthur Wong MD (02/26 8276)      1.   There has been interval repositioning of the left IJ central venous   catheter with tip projecting over the expected location of the SVC.   2.   New lucency in the medial right lung is concerning for a small   pneumothorax. Right basilar chest tube is unchanged.   3.   Patchy airspace opacities in the left lung are unchanged.   4.   Stable moderate volume left pleural effusion.            Dictated by: SAM CUNHA DO   Dictated on: 2/26/2024 4:20 AM          IARTHUR M.D., have reviewed the images and report and concur with   these findings interpreted by SAM CUNHA DO.      Electronically Signed by: ARTHUR WONG M.D.    Signed on: 2/26/2024 5:10 AM    Workstation ID: [id number]      XR CHEST AP OR PA   Final Result by Arthur Wong MD (02/26 9350)      1.   Small volume right apical pneumothorax. Right chest pigtail catheter   unchanged.   2.   Interval placement of left IJ central venous catheter with tip   projecting over the SVC. The tip of the catheter appears to be coiled.   Subsequent examination on the same date demonstrates repositioning.   3.   Patchy airspace opacities in the left lung are unchanged.   4.   Stable moderate  volume left pleural effusion.   5.   Support devices as above.               Dictated by: SAM CUNHA DO   Dictated on: 2/26/2024 4:24 AM          ARTHUR CHI M.D., have reviewed the images and report and concur with   these findings interpreted by SAM CUNHA DO.      Electronically Signed by: ARTHUR WONG M.D.    Signed on: 2/26/2024 5:05 AM    Workstation ID: [id number]      XR CHEST AP OR PA   Final Result by Arthur Wong MD (02/26 7864)   FINDINGS/IMPRESSION:   Interval intubation with endotracheal tube present with tip terminating   approximately 2 cm above the lazaro.   Right-sided pigtail catheter overlying the right lower chest.    Right-sided chest port with tip terminating over cavoatrial junction.   Enteric tube present with tip not included in the field-of-view but   coursing beneath the diaphragm towards the left abdomen.         Stable borderline enlarged cardiac silhouette.   Stable low left lung volumes.       New right apical pneumothorax measuring up to 1.2 cm.   Stable patchy airspace opacity most notably in the left lung base with   prominent interstitial markings. Stable blunting of the left costophrenic   angle compatible with small left pleural effusion.         No acute osseous abnormality.   Partially visualized skin staples overlying the abdomen.      Dictated by: KAYCEE MARTIN MD   Dictated on: 2/25/2024 11:50 PM          ARTHUR CHI M.D., have reviewed the images and report and concur with   these findings interpreted by KAYCEE MARTIN MD.      Electronically Signed by: ARTHUR WONG M.D.    Signed on: 2/26/2024 5:03 AM    Workstation ID: [id number]      XR ABDOMEN 1 VIEW   Final Result by Aline Gordillo MD (02/26 8689)   FINDINGS/IMPRESSION:      Tip of NG tube in mid to distal body of stomach. Metallic skin staples.      New pigtail catheter overlies lateral right upper quadrant.      Improvement free intraperitoneal air compared to prior study.      Irregular 3.7 x 0.5  cm air collection lateral right upper quadrant could   represent free intraperitoneal air with air related to infectious process   not excluded.       Dilated loops of small bowel right mid abdomen measure up to 7.0 cm in   diameter slightly more prominent compared to 2/24/2024. Beaking of one of   the loops of small bowel at the level of the right lower quadrant and right   iliac crest.      Findings consistent with mechanical small bowel obstruction.            Electronically Signed by: RACH CUADRA M.D.    Signed on: 2/26/2024 8:25 AM    Workstation ID: [id number]      XR CHEST AP OR PA   Final Result by Arthur Wong MD (02/26 6361)      1.   Increasing lucency in the right lung is concerning for interval   increased volume of a right pneumothorax. Correlate for chest tube   malfunction. Subsequent films demonstrate improvement.   2.   Improving aeration of the left lung.   3.   Stable moderate volume left pleural effusion.                           Dictated by: SAM CUNHA DO   Dictated on: 2/26/2024 4:33 AM          I, ARTHUR WONG M.D., have reviewed the images and report and concur with   these findings interpreted by SAM CUNHA DO.      Electronically Signed by: ARTHUR WONG M.D.    Signed on: 2/26/2024 5:01 AM    Workstation ID: [id number]      XR CHEST AP OR PA   Final Result by Rach Cuadra MD (02/25 8334)   FINDINGS/IMPRESSION:      1245 hours      Pigtail catheter overlies right lower chest.      No pneumothorax demonstrated.      Ill-defined opacities at both lung bases and left effusion relatively   unchanged.       Electronically Signed by: RACH CUADRA M.D.    Signed on: 2/25/2024 1:09 PM    Workstation ID: [id number]      XR CHEST AP OR PA   Final Result by Rach Cuadra MD (02/25 1612)   FINDINGS/IMPRESSION:      Tip of NG tube in proximal to mid body of stomach.      Normal heart size. Mild prominence of bronchovascular and interstitial   markings. Patchy  perihilar and bibasilar lung opacities and left effusion   slightly improved.      Electronically Signed by: RACH CUADRA M.D.    Signed on: 2/25/2024 9:57 AM    Workstation ID: [id number]      IR PLEURAL DRAINAGE INDWELLING CATHETER   Final Result by Mehran Alston MD (02/27 7180)      10 [place] right chest tube placed.      10 [place] right abdominal peritoneal drainage catheter placement.                     Electronically Signed by: MEHRAN ALSTON M.D.    Signed on: 2/27/2024 1:30 PM    Workstation ID: [id number]      IR PERITONEAL DRAINAGE CATHETER PLACEMENT   Final Result by Mehran Alston MD (02/27 3630)      10 [place] right chest tube placed.      10 [place] right abdominal peritoneal drainage catheter placement.                     Electronically Signed by: MEHRAN ALSTON M.D.    Signed on: 2/27/2024 1:30 PM    Workstation ID: [id number]      XR ABDOMEN 1 VIEW   Final Result by Rach Cuadra MD (02/24 0813)   FINDINGS/IMPRESSION:      Tip of NG tube in proximal body of stomach. Metallic skin staples overlie   right abdomen unchanged.      Prominent air-filled bowel loops mid abdomen measure up to 6.0 cm in   diameter relatively unchanged compared to prior AP  film from CT   abdomen pelvis dated 2/23/2024.      Findings suggestive of mechanical small bowel obstruction      Electronically Signed by: RACH CUADRA M.D.    Signed on: 2/24/2024 8:13 AM    Workstation ID: [id number]      CT CHEST ABDOMEN PELVIS WO CONTRAST   Final Result by Colin Carrera MD (02/23 4655)   1.   Persistent at least partial mechanical small bowel obstruction with   transition point in the right lower quadrant. Overall decreased degree of   bowel dilatation from 2/16/2024. Oral contrast is present distal to the   obstruction.   2.   New subcutaneous complex gas-containing collection coursing along the   midline incision likely represents abscess versus    gas-containing/superinfected hematoma.   3.   Slightly increased pneumoperitoneum since 2/16/2024 without interval   abdominal surgery raises suspicion for perforation/bowel dehiscence, though   exact site of perforated viscus is unclear. Gas producing intraperitoneal   infection is an alternative possibility in a patient with sepsis. Depending   on clinical factors, repeat laparotomy may be required.   4.   Large ascites, increased.   5.   Persistent moderate right hydroureteronephrosis to the level of the   pelvis. 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   6.   Stable thoracic findings including bilateral effusions and   atelectasis.      Dictated by: NELLY MARROQUIN MD   Dictated on: 2/23/2024 5:24 PM          IMACKENZIE M.D., have reviewed the images and report and concur   with these findings interpreted by NELLY MARROQUIN MD.      Electronically Signed by: MACKENZIE MORALES M.D.    Signed on: 2/23/2024 6:45 PM    Workstation ID: [id number]      XR CHEST AP OR PA   Final Result by Rach Cuadra MD (02/23 1147)      1.   Tip of NG tube in lateral fundus of stomach.   2.   Patchy airspace opacities in the lung bases are slightly improved when   compared to prior.    3.   Mildly prominent perihilar interstitial and bronchovascular markings.    4.   Stable pleural thickening/effusion along the left lateral chest.      5.   Prominent bowel loops mid abdomen partially included in field-of-view.   Metallic skin staples overlie abdomen.                     Dictated by: SAM CUNHA DO   Dictated on: 2/23/2024 10:32 AM          RACH CHI M.D., have reviewed the images and report and concur   with these findings interpreted by SAM CUNHA DO.      Electronically Signed by: RACH CUADRA M.D.    Signed on: 2/23/2024 11:47 AM    Workstation ID: [id number]      XR ABDOMEN 1 VIEW   Final Result by  Arthur Wong MD (02/22 0512)   FINDINGS AND IMPRESSION:      The feeding tube traverses the mediastinum and projects within the stomach.   Hazy opacity lung bases likely relate to cysts and pleural effusion on the   left. Postsurgical changes in the abdomen. Gaseous distention of partially   visualized bowel loops. Follow-up advised.            Electronically Signed by: ARTHUR WONG M.D.    Signed on: 2/22/2024 5:37 AM    Workstation ID: [id number]      XR CHEST AP OR PA   Final Result by Vlad Ariza MD (02/21 4838)      Right chest port with tip obscured, likely at the cavoatrial junction   region.   EKG leads on the chest.   Enteric tube is present with tip in the left upper quadrant, likely fundus   of stomach.   Prominent heart size.   Layering haziness of the lungs-likely a combination of layering pleural   fluid and active parenchymal disease-possibly edema or infection. Pleural   fluid seen tracking along the left chest wall-likely loculated. No   pneumothorax.   No significant change from prior study.         Dictated by: KI SARKAR MD   Dictated on: 2/21/2024 7:11 AM          IVLAD M.D., have reviewed the images and report and concur   with these findings interpreted by KI SARKAR MD.      Electronically Signed by: VLAD ARIZA M.D.    Signed on: 2/21/2024 7:39 AM    Workstation ID: [id number]      XR CHEST AP OR PA   Final Result by Rach Gordillo MD (02/20 1986)   FINDINGS/IMPRESSION:      Endotracheal tube seen on prior study no longer demonstrated. Tip of NG   tube in superior lateral fundus of stomach. Port-A-Cath. Needle overlies   port hole. Tip of catheter in superior vena cava.      Prominent bronchovascular and interstitial markings with bilateral lung   opacities worsened compared to prior study. Right effusion appears   worsened.      Fluid at lateral aspect left lung fields relatively unchanged.       Electronically Signed by: RACH  AZRA CUADRA    Signed on: 2/20/2024 12:49 PM    Workstation ID: [id number]      XR CHEST AP OR PA   Final Result by Arthur Wong MD (02/19 0700)   FINDINGS AND IMPRESSION:      The enteric tube has been partially retracted and projects in the gastric   fundus. Endotracheal tube and right-sided chest port unchanged. Low lung   volumes. Heart size stable. Patchy atelectasis both lung bases greater on   the left than the right. Bilateral pleural effusions greater on the left   with pleural thickening or fluid along the left lateral chest wall.   Superimposed infiltrate lung base not excluded. No pneumothorax. Mild   prominence and redistribution of vasculature unchanged.      Electronically Signed by: ARTHUR WONG M.D.    Signed on: 2/19/2024 7:00 AM    Workstation ID: [id number]      XR CHEST AP OR PA   Final Result by Arthur Wong MD (02/19 0637)   FINDINGS AND IMPRESSION:      Enteric tube looped in the gastric fundus. Endotracheal tube at the level   of the clavicle heads. Right-sided chest port in place. Low lung volumes.   Heart size prominent unchanged. Bilateral pleural effusions and pleural   thickening or fluid along the left lateral chest wall redemonstrated   unchanged. Patchy basilar atelectasis with superimposed infiltrate left   lung base medially not excluded. No pneumothorax.         Electronically Signed by: ARTHUR WONG M.D.    Signed on: 2/19/2024 6:37 AM    Workstation ID: [id number]      EGD   Final Result by Allyn Mendez RN (02/18 6075)      US VASC RENAL DUPLEX ONLY BILATERAL   Final Result by Mehran Alston MD (02/19 0927)      Limitations as detailed above.      Both renal arteries appear patent without distinct suggestion of stenosis.      Electronically Signed by: MEHRAN ALSTON M.D.    Signed on: 2/19/2024 9:27 AM    Workstation ID: [id number]      US KIDNEY AND DUPLEX BILATERAL   Final Result by Herbert Ramírez MD (02/18 1006)      1.   Limited portable  ultrasound. Evaluation of the left kidney is   essentially precluded by shadowing from overlying structures. Moderate to   severe right hydronephrosis. No obvious left-sided hydronephrosis.   2.   Ascites.   3.   Urinary bladder is not well visualized, and likely decompressed by   Ortiz catheter.               Electronically Signed by: LILLIE SALGADO M.D.    Signed on: 2/18/2024 10:06 AM    Workstation ID: [id number]      XR CHEST AP OR PA   Final Result by Monique Jeffery MD (02/18 1100)   Impression:    Overall stable exam compared to yesterday allowing for patient rotation   with similar left-sided effusion tracking up along the left chest wall and   associated compressive atelectasis.      Support apparatus stable as described.      Electronically Signed by: MONIQUE JEFFERY M.D.    Signed on: 2/18/2024 11:00 AM    Workstation ID: [id number]      XR ABDOMEN 1 VIEW   Final Result by Rach Cuadra MD (02/17 1103)   FINDINGS/IMPRESSION:      2/17/2024 1033 hours      NG tube coiled within proximal stomach. Tip located in lateral fundus or   proximal body of stomach.      Dilated loops of small bowel measure up to 5.6 cm in diameter appears   worsened compared to prior study.      Contrast in dilated right renal collecting system and right ureter   suggestive of obstruction distal right ureter. No change compared to prior   CT abdomen and pelvis dated 2/16/2024.       Electronically Signed by: RACH CUADRA M.D.    Signed on: 2/17/2024 11:07 AM    Workstation ID: [id number]      XR CHEST AP OR PA   Final Result by Rach Cuadra MD (02/17 1017)   FINDINGS/IMPRESSION:      Endotracheal tube 3 cm above the lazaro. Tip of NG tube in lateral fundus   of stomach.      Prominent heart size. Prominent bronchovascular and interstitial markings   with bilateral lung opacities improved. Left effusion tracking laterally to   level of left upper lung field unchanged. Suggestion of small right    effusion also unchanged.            Electronically Signed by: RACH CUADRA M.D.    Signed on: 2/17/2024 10:17 AM    Workstation ID: [id number]      XR CHEST AP OR PA   Final Result by Rach Cuadra MD (02/17 0493)   FINDINGS/IMPRESSION:      Endotracheal tube 2 cm above the lazaro. Tip of NG tube in superior lateral   fundus of stomach.      Prominent bronchovascular and interstitial markings with bilateral lung   opacities worsened compared to prior study. Left effusion unchanged.      No pneumothorax.       Electronically Signed by: RACH CUADRA M.D.    Signed on: 2/17/2024 9:59 AM    Workstation ID: [id number]      XR ABDOMEN 1 VIEW   Final Result by Rach Cuadra MD (02/17 8319)   FINDINGS/IMPRESSION:      2/16/2024 2309 hours      Tip of NG tube in superior lateral fundus of stomach.      Dilated loops of small bowel appear improved.       Electronically Signed by: RACH CUADRA M.D.    Signed on: 2/17/2024 11:03 AM    Workstation ID: [id number]      CTA ABDOMEN PELVIS   Final Result by Ryan Jeffery MD (02/17 3572)   1.   No evidence of active GI bleeding/contrast extravasation. If patient   rebleeds, a repeat study may be considered.   2.   Heterogeneous hyperdense material within the enteric tract noted on   noncontrast examination may relate to pre-existing blood products in the   stomach and proximal small bowel.   3.   Diffusely dilated small bowel measuring up to 5.8 cm with a distinct   transition point noted in the right hemipelvis. Decompressed small and   large intestine distally. Findings are concerning for at least partial   obstruction or high-grade focal ileus causing upstream dilatation.   4.   Moderate to severe right-sided hydroureteronephrosis. Change in   caliber noted in the distal ureter. Differential considerations include   malignant stricture or adhesions. This is similar to 2/12/2024.   5.   Dr. Norman was informed of these  findings by Dr. Alston via   telephone on 2/16/2024 11:19 PM.    6.   Moderate volume low-density ascites. Small amount of free air in the   nondependent abdomen, likely postoperative.    7.   Interval increase in moderate right-sided pleural effusion with   adjacent compressive atelectasis.    8.   Small left-sided pleural effusion with adjacent compressive   atelectasis.   9.   Redemonstrated indeterminate 1.3 cm soft tissue mass in the lower   outer right breast. Correlate with prior breast imaging and follow-up   breast imaging as indicated.   10.   Additional multiple chronic findings as above.            Dictated by: PAPI ALSTON   Dictated on: 2/16/2024 10:06 PM          I, MONIQUE PETERSON M.D., have reviewed the images and report and concur with   these findings interpreted by PAPI ALSTON.      Electronically Signed by: MONIQUE PETERSON M.D.    Signed on: 2/17/2024 10:43 AM    Workstation ID: [id number]      EGD   Final Result by Rut Perera RN (02/17 0015)      XR ABDOMEN 1 VIEW   Final Result by Rach Cuadra MD (02/16 9069)   FINDINGS/IMPRESSION:      Tip of NG tube in superior lateral fundus of stomach.      Metallic skin staples at midline.      Dilated bowel mid to upper abdomen likely represents small bowel and   measures up to 6.0 cm in diameter unchanged from prior study.      Mechanical obstruction not excluded.      Correlate clinically. Further studies could include CT abdomen and pelvis.      Electronically Signed by: RACH CUADRA M.D.    Signed on: 2/16/2024 4:37 PM    Workstation ID: [id number]      XR ABDOMEN 1 VIEW   Final Result by Rach Cuadra MD (02/16 3826)   FINDINGS/IMPRESSION:      Tip of NG tube in area proximal stomach.      Metallic skin staples at midline.      Dilated loops of small bowel mid abdomen measure up to about 5.2 cm in   diameter. Correlate clinically. Continued follow-up recommended.      Small free air under the left hemidiaphragm  likely related to recent   surgery.      Ill-defined opacities at left lung base and small left effusion.      Electronically Signed by: RACH CUADRA M.D.    Signed on: 2/16/2024 1:15 PM    Workstation ID: [id number]      XR CHEST AP OR PA   Final Result by Samy Ford MD (02/13 2056)   FINDINGS AND IMPRESSION: Interval removal of the endotracheal tube. Right   portacatheter and feeding tube again noted. Bilateral pleural effusions   with bibasilar atelectasis/infiltrate. Patchy opacity in the right   perihilar region corresponds with prior CT likely representing pulmonary   nodules or focal consolidation.      Electronically Signed by: SAMY FORD M.D.    Signed on: 2/13/2024 8:56 PM    Workstation ID: [id number]      XR CHEST AP OR PA   Final Result by Arthur Wong MD (02/13 0512)      1.   Interval intubation with endotracheal tube terminating 2 cm above the   level of the lazaro.    2.   Stable small volume left pleural effusion.   3.   Stable borderline enlarged heart.   4.   Mildly prominent perihilar interstitial markings may represent edema   or infection               Dictated by: SAM CUNHA DO   Dictated on: 2/13/2024 4:17 AM          ARTHUR CHI M.D., have reviewed the images and report and concur with   these findings interpreted by SAM CUNHA DO.      Electronically Signed by: ARTHUR WONG M.D.    Signed on: 2/13/2024 5:12 AM    Workstation ID: [id number]      XR CHEST AP OR PA   Final Result by Arthur Wong MD (02/13 0511)      1.   Overall stable examination.   2.   Endotracheal tube terminates 2.2 cm above the level of the lazaro.               Dictated by: SAM CUNHA DO   Dictated on: 2/13/2024 4:21 AM          ARTHUR CHI M.D., have reviewed the images and report and concur with   these findings interpreted by SAM CUNHA DO.      Electronically Signed by: ARTHUR WONG M.D.    Signed on: 2/13/2024 5:11 AM    Workstation ID: [id number]      CT CHEST ABDOMEN PELVIS W  CONTRAST   Final Result by Samy Ford MD (02/12 1931)   1.   Significant interval worsening of small bowel obstruction with   worsened mesenteric ascites. There are 2 closely approximated transition   points in the right lower quadrant are highly concerning for a closed loop   obstruction. There is no pneumoperitoneum, however there is a new   blind-ending focal outpouching along one of the transition points which may   represent a contained perforation. Given degree of dilatation, this patient   is at high risk for bowel ischemia and perforation. Findings discussed with   Dr. Thompson in the emergency room at 6:36 p.m. on 2/12/2024 by Dr. Marroquin via   telephone.   2.   Stable thoracic findings with the left lung volume loss and small   loculated left pleural effusion. Stable small right pleural effusion.   3.   New moderate to severe right hydronephrosis without evidence of   obstructing etiology. Extrinsic compression from dilated bowel loops is   possible.   4.   Soft tissue density along the posterior aspect of the distal stomach   is not well visualized on this exam secondary to bowel obstruction and   mesenteric ascites.   5.   Mildly thickened bladder wall is stable from prior.   6.   Stable ovoid 12 mm density in the outer right breast which is   suspicious for a mass. Correlate with prior breast imaging and follow-up as   indicated.                  Dictated by: NELLY MARROQUIN MD   Dictated on: 2/12/2024 6:35 PM          ISAMY M.D., have reviewed the images and report and concur with   these findings interpreted by NELLY MARROQUIN MD.      Electronically Signed by: SAMY FORD M.D.    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  Currently on octreotide and Protonix gtt. and TPN.  Clinical transfusional support to give hemoglobin more than 7.  Her baseline creatinine appears to between 1.0-1.3 but now up to 1.98 with slight bump in creatinine during the course of this hospitalization.  Nephrology consulted for the management of acute kidney injury.  Patient is intubated on ventilatory support and unable to give review of systems.    Problems:  #Acute kidney injury on CKD stage IIIb  #Acute respiratory failure   # Septic shock  #Hypotension  #Upper GI bleed requiring transfusion support  #Serous ovarian cancer stage OH on chemotherapy with cyclophosphamide  #malignant SBO status post expiratory laparotomy and small bowel resection on 2/12/2024    Plan:  Weaned from CRRT to intermittent hemodialysis as needed.  On intermittent hemodialysis/SLED now.  Differentials: CUAUHTEMOC likely from ischemic ATN from GI bleed and hypotension versus prerenal injury versus other etiologies.  Renal ultrasound shows moderate to severe right hydronephrosis with no obvious left hydronephrosis, ascites.  GI on board.  Avoid NSAIDs, nephrotoxins, IV contrast and hypotensive episodes.  Recommend transfusion support to keep hemoglobin more than 7.  CRRT stopped 3/11/2024.  Recommend low protein TPN.  Monitor urine output.  Hopeful for renal recovery  Blood pressure on high side.  Please give morning medications and if not improving, let me know and I will help out.  Hemoglobin 8.4<8.5<8.4<9.2<6.9.  Getting transfusional support.  BUN 70<<53<90   No plans for SLED/dialysis today.  Will continue to monitor on a daily basis.  Discussed with SICU team this morning.            Primary Care Physician  Gordy Pearson MD Kireet Agrawal, MD     Thank you for the consult and allowing me to participate in the care of this patient.  Will follow along with you. Please feel free to contact with any questions via [employer] or my cell at .  Critical care time spent 46 minutes excluding time spent in billable procedures.  MIPS  : Most recent systolic blood pressure <140 mmHmmHg   : Most recent diastolic blood pressure <90 mmHmmHg  : I ATTEST AND DOCUMENTED CURRENT MEDICATIONS IN THE MEDICAL RECORDS, UPDATED, OR REVIEWED THE PATIENT’S CURRENT MEDICATIONS.  [age]F: Current non smoker  BMI: high    [facility]  [facility]   Professional Building   [address], [address]  [place] [address]  Contact: [phone]  [employer]: Viet Olivares

## 2024-07-15 ENCOUNTER — MYC REFILL (OUTPATIENT)
Dept: INTERNAL MEDICINE | Facility: CLINIC | Age: 64
End: 2024-07-15
Payer: COMMERCIAL

## 2024-07-15 DIAGNOSIS — I10 ESSENTIAL HYPERTENSION: ICD-10-CM

## 2024-07-16 RX ORDER — LISINOPRIL 10 MG/1
10 TABLET ORAL DAILY
Qty: 90 TABLET | Refills: 1 | Status: SHIPPED | OUTPATIENT
Start: 2024-07-16

## 2024-08-28 ENCOUNTER — OFFICE VISIT (OUTPATIENT)
Dept: URGENT CARE | Facility: URGENT CARE | Age: 64
End: 2024-08-28
Payer: COMMERCIAL

## 2024-08-28 VITALS
TEMPERATURE: 98.4 F | HEART RATE: 68 BPM | OXYGEN SATURATION: 97 % | SYSTOLIC BLOOD PRESSURE: 159 MMHG | DIASTOLIC BLOOD PRESSURE: 97 MMHG | RESPIRATION RATE: 14 BRPM

## 2024-08-28 DIAGNOSIS — H61.22 IMPACTED CERUMEN OF LEFT EAR: Primary | ICD-10-CM

## 2024-08-28 PROCEDURE — 69209 REMOVE IMPACTED EAR WAX UNI: CPT | Mod: LT | Performed by: NURSE PRACTITIONER

## 2024-08-28 PROCEDURE — 99207 PR NO CHARGE LOS: CPT | Performed by: NURSE PRACTITIONER

## 2024-08-28 NOTE — PROGRESS NOTES
Chief Complaint   Patient presents with    Urgent Care    Ear Problem     Patient presents with his left ear plugged.     SUBJECTIVE:  Nick Coley is a 64 year old male presenting with cerumen impaction to his left ear.  He tried OTC Debrox drops and irrigation without success.  Feels full.  Declines pain drainage fevers recent swimming or flying.    Past Medical History:   Diagnosis Date    Abnormal LFTs (liver function tests) 12/18/2015    Negative hepatitis C antibody and ferritin of 330 6 December 2015, steatohepatitis suspected    Acute bilateral low back pain without sciatica 05/02/2022    Acute blood loss anemia 03/18/2021    WHIT (acute kidney injury) (H24) 03/18/2021    Benign essential hypertension     Cellulitis of left lower extremity 05/29/2020    Cellulitis, face 2014    Chronic bilateral low back pain without sciatica 11/14/2022    Chronic kidney disease     Chronic tension-type headache, not intractable 06/10/2019    Depression with anxiety     Essential hypertension 12/18/2015    Fatty liver     ultrasound 2001 demonstrated.    Hematoma of leg, left, subsequent encounter 07/23/2018    History of colon polyps     Colonoscopy November 2018 with multiple polyps removed, repeat in 3 years    History of gastric bypass     Normal sleeve gastrectomy June 2021    Hyperlipidemia     Irritable bowel syndrome     Kidney stones     x 4    LBP (low back pain)     Low back pain     Formatting of this note might be different from the original.  Replacement Utility updated for latest IMO load    Microscopic hematuria 07/23/2018    Moderate major depression (H) 06/10/2019    Morbid obesity (H) 12/18/2015    Normal sleeve gastrectomy June 2021    Nephrolithiasis 12/2020    Right    Nephrolithiasis     2013, 2009, 2001.  Right percutaneous nephrolithotomy March 2021.  Stone analysis 90% uric acid and 10% calcium oxalate    New onset headache 11/14/2022    Numbness of left anterior thigh 11/09/2021    Numbness and  burning left anterior thigh above his knee    ANAM (obstructive sleep apnea) 01/2021    Severe    Sleep apnea     uses cpap    Suspected sleep apnea 11/16/2018    Tear of meniscus of right knee 12/18/2015    Associated with right tibial plateau fracture 2013, arthroscopic knee surgery December 2015    Vitamin D deficiency     Vitamin D 10.0 November 2018    Wrist fracture      Current Outpatient Medications   Medication Sig Dispense Refill    calcium citrate-vitamin D (CITRACAL) 315-200 MG-UNIT TABS per tablet Take 2 tablets by mouth daily      Cholecalciferol 125 MCG (5000 UT) TABS Take 125 mcg by mouth daily      citalopram (CELEXA) 20 MG tablet Take 1 tablet (20 mg) by mouth daily 90 tablet 3    cyanocobalamin 1000 MCG sublingual tablet Place 1 tablet (1,000 mcg) under the tongue daily 90 tablet 3    lisinopril (ZESTRIL) 10 MG tablet Take 1 tablet (10 mg) by mouth daily 90 tablet 1    rosuvastatin (CRESTOR) 10 MG tablet Take 1 tablet (10 mg) by mouth daily 90 tablet 3    UNABLE TO FIND MEDICATION NAME: Men One A Day Multivitamin       No current facility-administered medications for this visit.     Social History     Tobacco Use    Smoking status: Never     Passive exposure: Never    Smokeless tobacco: Never    Tobacco comments:     cigar once or twice a year   Substance Use Topics    Alcohol use: Not Currently     Comment: infrequent     Allergies   Allergen Reactions    Adhesive Tape Rash     Silk tape not paper tape         Review of Systems  All systems negative except for those listed above in HPI.    OBJECTIVE:   BP (!) 159/97 (BP Location: Right arm)   Pulse 68   Temp 98.4  F (36.9  C) (Oral)   Resp 14   SpO2 97%   Physical Exam  Vitals reviewed.   Constitutional:       Appearance: Normal appearance.   HENT:      Head: Normocephalic and atraumatic.      Ears:      Comments: Left ear canal with impacted cerumen. The ear was gently flushed with peroxide diluted with warm water by MA with successful  removal of cerumen. TM was then visualized; mildly erythematous and intact.       Nose: Nose normal.      Mouth/Throat:      Mouth: Mucous membranes are moist.      Pharynx: Oropharynx is clear.   Eyes:      Extraocular Movements: Extraocular movements intact.      Conjunctiva/sclera: Conjunctivae normal.      Pupils: Pupils are equal, round, and reactive to light.   Cardiovascular:      Rate and Rhythm: Normal rate.   Pulmonary:      Effort: Pulmonary effort is normal.   Musculoskeletal:         General: Normal range of motion.      Cervical back: Normal range of motion and neck supple.   Skin:     General: Skin is warm and dry.      Findings: No rash.   Neurological:      General: No focal deficit present.      Mental Status: He is alert and oriented to person, place, and time.   Psychiatric:         Mood and Affect: Mood normal.         Behavior: Behavior normal.       ASSESSMENT:    ICD-10-CM    1. Impacted cerumen of left ear  H61.22 VA REMOVAL IMPACTED CERUMEN IRRIGATION/LVG UNILAT        PLAN:     Your ears were irrigated today.  Discouraged use of Q-tips/sravani pins. This may only aggravate the problem.   Do not use ear candles or home irrigation as these techniques may cause injury and have not been proven to be effective.    In the future if wax becomes an issue again, may use 1:1 peroxide/water solution, mineral oil or Debrox at home.  -Place 5-10 drops in ear  -Tilt you head to the side and allow solution to sit for 5 minutes  -Tilt ear to the other side to allow solution to drain  -If symptoms do not improve in 3-4 days, follow up in a clinic to have ears flushed    To prevent build up, dip a cotton ball in mineral oil and place in ear for 10-15 min to allow wax to soften.  If wax build up is a recurring problem for you, come in every 6 months- 1 year to have your ears cleaned.    Follow up with primary care provider with any problems, questions or concerns or if symptoms worsen or fail to improve.  Patient agreed to plan and verbalized understanding.    Barbara Ochoa, VIOLETA-BC  [facility]

## 2024-11-08 ENCOUNTER — PATIENT OUTREACH (OUTPATIENT)
Dept: GASTROENTEROLOGY | Facility: CLINIC | Age: 64
End: 2024-11-08
Payer: COMMERCIAL

## 2024-11-08 DIAGNOSIS — Z12.11 SPECIAL SCREENING FOR MALIGNANT NEOPLASMS, COLON: Primary | ICD-10-CM

## 2024-11-08 NOTE — PROGRESS NOTES
"Pt with hx of adenomatous polyps with 3 yr recall recommended on last colonoscopy    [facility] Screening Colonoscopy Referral Review    Patient meets the inclusion criteria for screening colonoscopy standing order.    Ordering/Referring Provider:  Long Weston     BMI: Estimated body mass index is 45.95 kg/m  as calculated from the following:    Height as of 1/19/24: 1.721 m (5' 7.75\").    Weight as of 1/19/24: 136.1 kg (300 lb).     Sedation:  Does patient have any of the following conditions affecting sedation?  No medical conditions affecting sedation.    Previous Scopes:  Any previous recommendations or follow up needs based on previous scope?  na / No recommendations.    Medical Concerns to Postpone Order:  Does patient have any of the following medical concerns that should postpone/delay colonoscopy referral?  No medical conditions affecting colonoscopy referral.    Final Referral Details:  Based on patient's medical history patient is appropriate for referral order with moderate sedation. If patient's BMI > 50 do not schedule in ASC.  "

## 2024-11-21 ENCOUNTER — OFFICE VISIT (OUTPATIENT)
Dept: URGENT CARE | Facility: URGENT CARE | Age: 64
End: 2024-11-21
Payer: COMMERCIAL

## 2024-11-21 VITALS
WEIGHT: 310 LBS | TEMPERATURE: 98.3 F | BODY MASS INDEX: 46.98 KG/M2 | OXYGEN SATURATION: 95 % | HEART RATE: 72 BPM | HEIGHT: 68 IN | DIASTOLIC BLOOD PRESSURE: 96 MMHG | SYSTOLIC BLOOD PRESSURE: 155 MMHG | RESPIRATION RATE: 17 BRPM

## 2024-11-21 DIAGNOSIS — S46.219A BICEPS STRAIN, INITIAL ENCOUNTER: Primary | ICD-10-CM

## 2024-11-21 RX ORDER — CYCLOBENZAPRINE HCL 10 MG
10 TABLET ORAL 3 TIMES DAILY PRN
Qty: 30 TABLET | Refills: 0 | Status: SHIPPED | OUTPATIENT
Start: 2024-11-21 | End: 2024-11-28

## 2024-11-21 NOTE — PATIENT INSTRUCTIONS
Patient was educated on natural course of condition. Conservative measures discussed including rest, ice, lidocaine patches, and over-the-counter analgesics (Tylenol or Ibuprofen) as needed. See ortho if symptoms worsen or do not improve in 2 weeks. Seek emergency care if you develop severe pain/swelling, inability to move extremity, skin paleness, or weakness.

## 2024-11-21 NOTE — PROGRESS NOTES
URGENT CARE VISIT:    SUBJECTIVE:   Chief Complaint   Patient presents with    Arm Pain     X3 weeks of pain in left arm when lifting     Urgent Care      Nick Coley is a 64 year old male who presents with a chief complaint of left upper arm pain.  Symptoms began 3 week(s) ago, are moderate and sudden onset. No known injury. Pain has worsened. It is constant. Pain feels like a pulling pain in bicep.  Pain exacerbated by movement. Relieved by rest.  He treated it initially with no therapy. This is the first time this type of injury has occurred to this patient.     PMH:   Past Medical History:   Diagnosis Date    Abnormal LFTs (liver function tests) 12/18/2015    Negative hepatitis C antibody and ferritin of 330 6 December 2015, steatohepatitis suspected    Acute bilateral low back pain without sciatica 05/02/2022    Acute blood loss anemia 03/18/2021    WHIT (acute kidney injury) (H) 03/18/2021    Benign essential hypertension     Cellulitis of left lower extremity 05/29/2020    Cellulitis, face 2014    Chronic bilateral low back pain without sciatica 11/14/2022    Chronic kidney disease     Chronic tension-type headache, not intractable 06/10/2019    Depression with anxiety     Essential hypertension 12/18/2015    Fatty liver     ultrasound 2001 demonstrated.    Hematoma of leg, left, subsequent encounter 07/23/2018    History of colon polyps     Colonoscopy November 2018 with multiple polyps removed, repeat in 3 years    History of gastric bypass     Normal sleeve gastrectomy June 2021    Hyperlipidemia     Irritable bowel syndrome     Kidney stones     x 4    LBP (low back pain)     Low back pain     Formatting of this note might be different from the original.  Replacement Utility updated for latest IMO load    Microscopic hematuria 07/23/2018    Moderate major depression (H) 06/10/2019    Morbid obesity (H) 12/18/2015    Normal sleeve gastrectomy June 2021    Nephrolithiasis 12/2020    Right     "Nephrolithiasis     2013, 2009, 2001.  Right percutaneous nephrolithotomy March 2021.  Stone analysis 90% uric acid and 10% calcium oxalate    New onset headache 11/14/2022    Numbness of left anterior thigh 11/09/2021    Numbness and burning left anterior thigh above his knee    ANAM (obstructive sleep apnea) 01/2021    Severe    Sleep apnea     uses cpap    Suspected sleep apnea 11/16/2018    Tear of meniscus of right knee 12/18/2015    Associated with right tibial plateau fracture 2013, arthroscopic knee surgery December 2015    Vitamin D deficiency     Vitamin D 10.0 November 2018    Wrist fracture      Allergies: Adhesive tape   Medications:   Current Outpatient Medications   Medication Sig Dispense Refill    calcium citrate-vitamin D (CITRACAL) 315-200 MG-UNIT TABS per tablet Take 2 tablets by mouth daily      Cholecalciferol 125 MCG (5000 UT) TABS Take 125 mcg by mouth daily      citalopram (CELEXA) 20 MG tablet Take 1 tablet (20 mg) by mouth daily 90 tablet 3    cyanocobalamin 1000 MCG sublingual tablet Place 1 tablet (1,000 mcg) under the tongue daily 90 tablet 3    cyclobenzaprine (FLEXERIL) 10 MG tablet Take 1 tablet (10 mg) by mouth 3 times daily as needed for muscle spasms. 30 tablet 0    lisinopril (ZESTRIL) 10 MG tablet Take 1 tablet (10 mg) by mouth daily 90 tablet 1    rosuvastatin (CRESTOR) 10 MG tablet Take 1 tablet (10 mg) by mouth daily 90 tablet 3    UNABLE TO FIND MEDICATION NAME: Men One A Day Multivitamin       Social History:   Social History     Tobacco Use    Smoking status: Never     Passive exposure: Never    Smokeless tobacco: Never    Tobacco comments:     cigar once or twice a year   Substance Use Topics    Alcohol use: Not Currently     Comment: infrequent       ROS:  Review of systems negative except as stated above.    OBJECTIVE:  BP (!) 155/96   Pulse 72   Temp 98.3  F (36.8  C) (Temporal)   Resp 17   Ht 1.727 m (5' 8\")   Wt 140.6 kg (310 lb)   SpO2 95%   BMI 47.14 kg/m  "   GENERAL APPEARANCE: healthy, alert and no distress  MUSCULOSKELETAL: NTTP over left clavicle, ac joint, or bicep. FROM shoulder.  EXTREMITIES: peripheral pulses normal  SKIN: no rashes  NEURO: sensation intact       ASSESSMENT:    ICD-10-CM    1. Biceps strain, initial encounter  S46.219A cyclobenzaprine (FLEXERIL) 10 MG tablet          PLAN:  Patient Instructions   Patient was educated on natural course of condition. Conservative measures discussed including rest, ice, lidocaine patches, and over-the-counter analgesics (Tylenol or Ibuprofen) as needed. See ortho if symptoms worsen or do not improve in 2 weeks. Seek emergency care if you develop severe pain/swelling, inability to move extremity, skin paleness, or weakness.     Patient verbalized understanding and is agreeable to plan. The patient was discharged ambulatory and in stable condition.    Leatha Dillard PA-C on 11/21/2024 at 4:52 PM

## 2025-03-01 ENCOUNTER — HEALTH MAINTENANCE LETTER (OUTPATIENT)
Age: 65
End: 2025-03-01

## (undated) RX ORDER — TRIAMCINOLONE ACETONIDE 40 MG/ML
INJECTION, SUSPENSION INTRA-ARTICULAR; INTRAMUSCULAR
Status: DISPENSED
Start: 2023-10-31

## (undated) RX ORDER — LIDOCAINE HYDROCHLORIDE 10 MG/ML
INJECTION, SOLUTION EPIDURAL; INFILTRATION; INTRACAUDAL; PERINEURAL
Status: DISPENSED
Start: 2023-10-31